# Patient Record
Sex: FEMALE | Race: BLACK OR AFRICAN AMERICAN | NOT HISPANIC OR LATINO | Employment: FULL TIME | URBAN - METROPOLITAN AREA
[De-identification: names, ages, dates, MRNs, and addresses within clinical notes are randomized per-mention and may not be internally consistent; named-entity substitution may affect disease eponyms.]

---

## 2017-01-08 ENCOUNTER — HOSPITAL ENCOUNTER (EMERGENCY)
Facility: HOSPITAL | Age: 22
Discharge: HOME | End: 2017-01-08
Attending: EMERGENCY MEDICINE | Admitting: EMERGENCY MEDICINE
Payer: COMMERCIAL

## 2017-01-08 VITALS
RESPIRATION RATE: 20 BRPM | HEIGHT: 63 IN | OXYGEN SATURATION: 98 % | BODY MASS INDEX: 26.93 KG/M2 | HEART RATE: 74 BPM | TEMPERATURE: 97.8 F | WEIGHT: 152 LBS | DIASTOLIC BLOOD PRESSURE: 72 MMHG | SYSTOLIC BLOOD PRESSURE: 129 MMHG

## 2017-01-08 DIAGNOSIS — L02.91 ABSCESS: Primary | ICD-10-CM

## 2017-01-08 LAB
ANION GAP SERPL CALCULATED.3IONS-SCNC: 9 MMOL/L (ref 4–13)
BUN SERPL-MCNC: 5 MG/DL (ref 5–25)
CALCIUM SERPL-MCNC: 9.2 MG/DL (ref 8.3–10.1)
CHLORIDE SERPL-SCNC: 103 MMOL/L (ref 100–108)
CO2 SERPL-SCNC: 27 MMOL/L (ref 21–32)
CREAT SERPL-MCNC: 0.69 MG/DL (ref 0.6–1.3)
EOSINOPHIL # BLD AUTO: 0.37 THOUSAND/UL (ref 0–0.61)
EOSINOPHIL NFR BLD MANUAL: 6 % (ref 0–6)
ERYTHROCYTE [DISTWIDTH] IN BLOOD BY AUTOMATED COUNT: 13.5 % (ref 11.6–15.1)
GFR SERPL CREATININE-BSD FRML MDRD: >60 ML/MIN/1.73SQ M
GLUCOSE SERPL-MCNC: 87 MG/DL (ref 65–140)
HCT VFR BLD AUTO: 41.1 % (ref 37–47)
HGB BLD-MCNC: 13.3 G/DL (ref 12–16)
LYMPHOCYTES # BLD AUTO: 1.22 THOUSAND/UL (ref 0.6–4.47)
LYMPHOCYTES # BLD AUTO: 20 %
MCH RBC QN AUTO: 25.7 PG (ref 27–31)
MCHC RBC AUTO-ENTMCNC: 32.3 G/DL (ref 31.4–37.4)
MCV RBC AUTO: 80 FL (ref 82–98)
MONOCYTES # BLD AUTO: 0.31 THOUSAND/UL (ref 0–1.22)
MONOCYTES NFR BLD AUTO: 5 % (ref 4–12)
NEUTS BAND NFR BLD MANUAL: 0 % (ref 0–8)
NEUTS SEG # BLD: 4.21 THOUSAND/UL (ref 1.81–6.82)
NEUTS SEG NFR BLD AUTO: 69 %
PLATELET # BLD AUTO: 232 THOUSANDS/UL (ref 130–400)
PLATELET BLD QL SMEAR: ADEQUATE
PMV BLD AUTO: 8.8 FL (ref 8.9–12.7)
POTASSIUM SERPL-SCNC: 4 MMOL/L (ref 3.5–5.3)
RBC # BLD AUTO: 5.17 MILLION/UL (ref 4.2–5.4)
SODIUM SERPL-SCNC: 139 MMOL/L (ref 136–145)
TOTAL CELLS COUNTED SPEC: 100
WBC # BLD AUTO: 6.1 THOUSAND/UL (ref 4.8–10.8)

## 2017-01-08 PROCEDURE — 85007 BL SMEAR W/DIFF WBC COUNT: CPT | Performed by: EMERGENCY MEDICINE

## 2017-01-08 PROCEDURE — 80048 BASIC METABOLIC PNL TOTAL CA: CPT | Performed by: EMERGENCY MEDICINE

## 2017-01-08 PROCEDURE — 36415 COLL VENOUS BLD VENIPUNCTURE: CPT | Performed by: EMERGENCY MEDICINE

## 2017-01-08 PROCEDURE — 85025 COMPLETE CBC W/AUTO DIFF WBC: CPT | Performed by: EMERGENCY MEDICINE

## 2017-01-08 PROCEDURE — 87040 BLOOD CULTURE FOR BACTERIA: CPT | Performed by: EMERGENCY MEDICINE

## 2017-01-08 PROCEDURE — 99283 EMERGENCY DEPT VISIT LOW MDM: CPT

## 2017-01-08 PROCEDURE — 85027 COMPLETE CBC AUTOMATED: CPT | Performed by: EMERGENCY MEDICINE

## 2017-01-08 RX ORDER — SULFAMETHOXAZOLE AND TRIMETHOPRIM 800; 160 MG/1; MG/1
1 TABLET ORAL 2 TIMES DAILY
Qty: 14 TABLET | Refills: 0 | Status: SHIPPED | OUTPATIENT
Start: 2017-01-08 | End: 2017-01-15

## 2017-01-08 RX ORDER — SULFAMETHOXAZOLE AND TRIMETHOPRIM 800; 160 MG/1; MG/1
1 TABLET ORAL ONCE
Status: COMPLETED | OUTPATIENT
Start: 2017-01-08 | End: 2017-01-08

## 2017-01-08 RX ADMIN — SULFAMETHOXAZOLE AND TRIMETHOPRIM 1 TABLET: 800; 160 TABLET ORAL at 18:02

## 2017-01-14 LAB
BACTERIA BLD CULT: NORMAL
BACTERIA BLD CULT: NORMAL

## 2017-05-19 ENCOUNTER — ALLSCRIPTS OFFICE VISIT (OUTPATIENT)
Dept: OTHER | Facility: OTHER | Age: 22
End: 2017-05-19

## 2017-06-16 ENCOUNTER — APPOINTMENT (EMERGENCY)
Dept: RADIOLOGY | Facility: HOSPITAL | Age: 22
End: 2017-06-16
Payer: COMMERCIAL

## 2017-06-16 ENCOUNTER — HOSPITAL ENCOUNTER (EMERGENCY)
Facility: HOSPITAL | Age: 22
Discharge: HOME | End: 2017-06-16
Attending: EMERGENCY MEDICINE | Admitting: EMERGENCY MEDICINE
Payer: COMMERCIAL

## 2017-06-16 VITALS
WEIGHT: 128 LBS | OXYGEN SATURATION: 100 % | RESPIRATION RATE: 20 BRPM | TEMPERATURE: 98.8 F | BODY MASS INDEX: 22.67 KG/M2 | HEART RATE: 83 BPM | SYSTOLIC BLOOD PRESSURE: 134 MMHG | DIASTOLIC BLOOD PRESSURE: 77 MMHG

## 2017-06-16 DIAGNOSIS — J45.901 ASTHMA EXACERBATION: Primary | ICD-10-CM

## 2017-06-16 PROCEDURE — 71020 HB CHEST X-RAY 2VW FRONTAL&LATL: CPT

## 2017-06-16 PROCEDURE — 99283 EMERGENCY DEPT VISIT LOW MDM: CPT

## 2017-06-16 PROCEDURE — 94640 AIRWAY INHALATION TREATMENT: CPT

## 2017-06-16 RX ORDER — IPRATROPIUM BROMIDE AND ALBUTEROL SULFATE 2.5; .5 MG/3ML; MG/3ML
3 SOLUTION RESPIRATORY (INHALATION) ONCE
Status: COMPLETED | OUTPATIENT
Start: 2017-06-16 | End: 2017-06-16

## 2017-06-16 RX ORDER — PREDNISONE 50 MG/1
50 TABLET ORAL DAILY
Qty: 5 TABLET | Refills: 0 | Status: SHIPPED | OUTPATIENT
Start: 2017-06-16 | End: 2017-06-21

## 2017-06-16 RX ORDER — ALBUTEROL SULFATE 90 UG/1
2 AEROSOL, METERED RESPIRATORY (INHALATION) EVERY 4 HOURS PRN
Qty: 1 INHALER | Refills: 0 | Status: SHIPPED | OUTPATIENT
Start: 2017-06-16 | End: 2017-10-05

## 2017-06-16 RX ORDER — PREDNISONE 20 MG/1
60 TABLET ORAL ONCE
Status: COMPLETED | OUTPATIENT
Start: 2017-06-16 | End: 2017-06-16

## 2017-06-16 RX ORDER — IPRATROPIUM BROMIDE AND ALBUTEROL SULFATE 2.5; .5 MG/3ML; MG/3ML
SOLUTION RESPIRATORY (INHALATION)
Status: COMPLETED
Start: 2017-06-16 | End: 2017-06-16

## 2017-06-16 RX ADMIN — IPRATROPIUM BROMIDE AND ALBUTEROL SULFATE 3 ML: .5; 3 SOLUTION RESPIRATORY (INHALATION) at 02:13

## 2017-06-16 RX ADMIN — IPRATROPIUM BROMIDE AND ALBUTEROL SULFATE 3 ML: 2.5; .5 SOLUTION RESPIRATORY (INHALATION) at 02:13

## 2017-06-16 RX ADMIN — IPRATROPIUM BROMIDE AND ALBUTEROL SULFATE 3 ML: .5; 3 SOLUTION RESPIRATORY (INHALATION) at 02:45

## 2017-06-16 RX ADMIN — PREDNISONE 60 MG: 20 TABLET ORAL at 02:44

## 2017-06-27 ENCOUNTER — GENERIC CONVERSION - ENCOUNTER (OUTPATIENT)
Dept: OTHER | Facility: OTHER | Age: 22
End: 2017-06-27

## 2017-07-11 ENCOUNTER — LAB CONVERSION - ENCOUNTER (OUTPATIENT)
Dept: FAMILY MEDICINE CLINIC | Facility: CLINIC | Age: 22
End: 2017-07-11

## 2017-08-02 ENCOUNTER — GENERIC CONVERSION - ENCOUNTER (OUTPATIENT)
Dept: OTHER | Facility: OTHER | Age: 22
End: 2017-08-02

## 2017-08-02 ENCOUNTER — APPOINTMENT (EMERGENCY)
Dept: RADIOLOGY | Facility: HOSPITAL | Age: 22
End: 2017-08-02
Payer: COMMERCIAL

## 2017-08-02 ENCOUNTER — HOSPITAL ENCOUNTER (EMERGENCY)
Facility: HOSPITAL | Age: 22
Discharge: HOME | End: 2017-08-02
Attending: EMERGENCY MEDICINE | Admitting: EMERGENCY MEDICINE
Payer: COMMERCIAL

## 2017-08-02 VITALS
OXYGEN SATURATION: 100 % | TEMPERATURE: 98.8 F | DIASTOLIC BLOOD PRESSURE: 83 MMHG | HEART RATE: 88 BPM | RESPIRATION RATE: 20 BRPM | SYSTOLIC BLOOD PRESSURE: 136 MMHG

## 2017-08-02 DIAGNOSIS — J45.901 ASTHMA EXACERBATION: Primary | ICD-10-CM

## 2017-08-02 LAB
BASOPHILS # BLD AUTO: 0 THOUSANDS/ΜL (ref 0–0.1)
BASOPHILS NFR BLD AUTO: 1 % (ref 0–1)
EOSINOPHIL # BLD AUTO: 0.5 THOUSAND/ΜL (ref 0–0.61)
EOSINOPHIL NFR BLD AUTO: 8 % (ref 0–6)
ERYTHROCYTE [DISTWIDTH] IN BLOOD BY AUTOMATED COUNT: 13.4 % (ref 11.6–15.1)
HCT VFR BLD AUTO: 43.4 % (ref 37–47)
HGB BLD-MCNC: 14.3 G/DL (ref 12–16)
LYMPHOCYTES # BLD AUTO: 2.4 THOUSANDS/ΜL (ref 0.6–4.47)
LYMPHOCYTES NFR BLD AUTO: 37 % (ref 14–44)
MCH RBC QN AUTO: 26.7 PG (ref 27–31)
MCHC RBC AUTO-ENTMCNC: 33 G/DL (ref 31.4–37.4)
MCV RBC AUTO: 81 FL (ref 82–98)
MONOCYTES # BLD AUTO: 0.5 THOUSAND/ΜL (ref 0.17–1.22)
MONOCYTES NFR BLD AUTO: 7 % (ref 4–12)
NEUTROPHILS # BLD AUTO: 3.1 THOUSANDS/ΜL (ref 1.85–7.62)
NEUTS SEG NFR BLD AUTO: 48 % (ref 43–75)
NRBC BLD AUTO-RTO: 0 /100 WBCS
PLATELET # BLD AUTO: 200 THOUSANDS/UL (ref 130–400)
PMV BLD AUTO: 8.5 FL (ref 8.9–12.7)
RBC # BLD AUTO: 5.35 MILLION/UL (ref 4.2–5.4)
WBC # BLD AUTO: 6.4 THOUSAND/UL (ref 4.8–10.8)

## 2017-08-02 PROCEDURE — 36415 COLL VENOUS BLD VENIPUNCTURE: CPT | Performed by: EMERGENCY MEDICINE

## 2017-08-02 PROCEDURE — 71010 HB CHEST X-RAY 1 VIEW FRONTAL (PORTABLE): CPT

## 2017-08-02 PROCEDURE — 85025 COMPLETE CBC W/AUTO DIFF WBC: CPT | Performed by: EMERGENCY MEDICINE

## 2017-08-02 PROCEDURE — 99285 EMERGENCY DEPT VISIT HI MDM: CPT

## 2017-08-02 PROCEDURE — 94644 CONT INHLJ TX 1ST HOUR: CPT

## 2017-08-02 PROCEDURE — 96374 THER/PROPH/DIAG INJ IV PUSH: CPT

## 2017-08-02 PROCEDURE — 94640 AIRWAY INHALATION TREATMENT: CPT

## 2017-08-02 RX ORDER — IPRATROPIUM BROMIDE AND ALBUTEROL SULFATE 2.5; .5 MG/3ML; MG/3ML
3 SOLUTION RESPIRATORY (INHALATION) ONCE
Status: COMPLETED | OUTPATIENT
Start: 2017-08-02 | End: 2017-08-02

## 2017-08-02 RX ORDER — IPRATROPIUM BROMIDE AND ALBUTEROL SULFATE 2.5; .5 MG/3ML; MG/3ML
SOLUTION RESPIRATORY (INHALATION)
Status: COMPLETED
Start: 2017-08-02 | End: 2017-08-02

## 2017-08-02 RX ORDER — METHYLPREDNISOLONE SODIUM SUCCINATE 125 MG/2ML
125 INJECTION, POWDER, LYOPHILIZED, FOR SOLUTION INTRAMUSCULAR; INTRAVENOUS ONCE
Status: COMPLETED | OUTPATIENT
Start: 2017-08-02 | End: 2017-08-02

## 2017-08-02 RX ORDER — ALBUTEROL SULFATE 90 UG/1
2 AEROSOL, METERED RESPIRATORY (INHALATION) EVERY 4 HOURS PRN
Qty: 1 INHALER | Refills: 0 | Status: SHIPPED | OUTPATIENT
Start: 2017-08-02 | End: 2017-10-05

## 2017-08-02 RX ORDER — PREDNISONE 20 MG/1
60 TABLET ORAL DAILY
Qty: 15 TABLET | Refills: 0 | Status: SHIPPED | OUTPATIENT
Start: 2017-08-02 | End: 2017-08-07

## 2017-08-02 RX ADMIN — IPRATROPIUM BROMIDE AND ALBUTEROL SULFATE 3 ML: 2.5; .5 SOLUTION RESPIRATORY (INHALATION) at 04:58

## 2017-08-02 RX ADMIN — IPRATROPIUM BROMIDE AND ALBUTEROL SULFATE 3 ML: .5; 3 SOLUTION RESPIRATORY (INHALATION) at 04:58

## 2017-08-02 RX ADMIN — METHYLPREDNISOLONE SODIUM SUCCINATE 125 MG: 125 INJECTION, POWDER, FOR SOLUTION INTRAMUSCULAR; INTRAVENOUS at 04:58

## 2017-08-02 RX ADMIN — ALBUTEROL SULFATE 10 MG: 2.5 SOLUTION RESPIRATORY (INHALATION) at 04:48

## 2017-08-03 ENCOUNTER — ALLSCRIPTS OFFICE VISIT (OUTPATIENT)
Dept: OTHER | Facility: OTHER | Age: 22
End: 2017-08-03

## 2017-08-22 ENCOUNTER — ALLSCRIPTS OFFICE VISIT (OUTPATIENT)
Dept: OTHER | Facility: OTHER | Age: 22
End: 2017-08-22

## 2017-09-12 ENCOUNTER — GENERIC CONVERSION - ENCOUNTER (OUTPATIENT)
Dept: OTHER | Facility: OTHER | Age: 22
End: 2017-09-12

## 2017-10-05 ENCOUNTER — HOSPITAL ENCOUNTER (EMERGENCY)
Facility: HOSPITAL | Age: 22
Discharge: HOME | End: 2017-10-05
Attending: EMERGENCY MEDICINE | Admitting: EMERGENCY MEDICINE
Payer: COMMERCIAL

## 2017-10-05 ENCOUNTER — APPOINTMENT (EMERGENCY)
Dept: RADIOLOGY | Facility: HOSPITAL | Age: 22
End: 2017-10-05
Payer: COMMERCIAL

## 2017-10-05 VITALS
HEIGHT: 63 IN | WEIGHT: 132 LBS | BODY MASS INDEX: 23.39 KG/M2 | HEART RATE: 99 BPM | DIASTOLIC BLOOD PRESSURE: 85 MMHG | OXYGEN SATURATION: 95 % | TEMPERATURE: 99.3 F | RESPIRATION RATE: 20 BRPM | SYSTOLIC BLOOD PRESSURE: 149 MMHG

## 2017-10-05 DIAGNOSIS — J45.901 ASTHMA EXACERBATION: Primary | ICD-10-CM

## 2017-10-05 PROCEDURE — 71020 HB CHEST X-RAY 2VW FRONTAL&LATL: CPT

## 2017-10-05 PROCEDURE — 99284 EMERGENCY DEPT VISIT MOD MDM: CPT

## 2017-10-05 PROCEDURE — 94640 AIRWAY INHALATION TREATMENT: CPT

## 2017-10-05 RX ORDER — PREDNISONE 20 MG/1
60 TABLET ORAL ONCE
Status: COMPLETED | OUTPATIENT
Start: 2017-10-05 | End: 2017-10-05

## 2017-10-05 RX ORDER — PROMETHAZINE HYDROCHLORIDE AND CODEINE PHOSPHATE 6.25; 1 MG/5ML; MG/5ML
5 SYRUP ORAL EVERY 4 HOURS PRN
Qty: 120 ML | Refills: 0 | Status: SHIPPED | OUTPATIENT
Start: 2017-10-05 | End: 2017-10-15

## 2017-10-05 RX ORDER — ALBUTEROL SULFATE 2.5 MG/3ML
5 SOLUTION RESPIRATORY (INHALATION) ONCE
Status: COMPLETED | OUTPATIENT
Start: 2017-10-05 | End: 2017-10-05

## 2017-10-05 RX ORDER — ALBUTEROL SULFATE 90 UG/1
2 AEROSOL, METERED RESPIRATORY (INHALATION) EVERY 4 HOURS PRN
Qty: 1 INHALER | Refills: 0 | Status: SHIPPED | OUTPATIENT
Start: 2017-10-05 | End: 2018-03-30 | Stop reason: ALTCHOICE

## 2017-10-05 RX ORDER — ALBUTEROL SULFATE 2.5 MG/3ML
2.5 SOLUTION RESPIRATORY (INHALATION) EVERY 6 HOURS PRN
Qty: 75 ML | Refills: 0 | Status: SHIPPED | OUTPATIENT
Start: 2017-10-05 | End: 2020-02-02

## 2017-10-05 RX ORDER — PREDNISONE 50 MG/1
50 TABLET ORAL DAILY
Qty: 5 TABLET | Refills: 0 | Status: SHIPPED | OUTPATIENT
Start: 2017-10-05 | End: 2017-10-10

## 2017-10-05 RX ADMIN — IPRATROPIUM BROMIDE 0.5 MG: 0.5 SOLUTION RESPIRATORY (INHALATION) at 11:53

## 2017-10-05 RX ADMIN — ALBUTEROL SULFATE 5 MG: 2.5 SOLUTION RESPIRATORY (INHALATION) at 11:53

## 2017-10-05 RX ADMIN — PREDNISONE 60 MG: 20 TABLET ORAL at 11:32

## 2017-10-05 NOTE — ED NOTES
Neb completed  Pt remains resting quietly with daughter in bed with pt  Pt  with resp easy and unlabored  Occasional moist productive cough noted         Melinda Yeager, PIALR  10/05/17 7414

## 2017-10-05 NOTE — DISCHARGE INSTRUCTIONS
Asthma, Ambulatory Care   GENERAL INFORMATION:   Asthma  is a lung disease that makes breathing difficult  Chronic inflammation and reactions to triggers narrow the airways in your lungs  Asthma can become life-threatening if it is not managed  Common symptoms include the following:   · Coughing     · Wheezing     · Shortness of breath     · Chest tightness  Seek immediate care for the following symptoms:   · Severe shortness of breath    · Blue or gray lips or nails    · Skin around your neck and ribs pulls in with each breath    · Shortness of breath, even after you take your short-term medicine as directed     · Peak flow numbers in the red zone of your asthma action plan  Treatment for asthma  will depend on how severe it is  Medicine may decrease inflammation, open airways, and make it easier to breathe  Medicines may be inhaled, taken as a pill, or injected  Short-term medicines relieve your symptoms quickly  Long-term medicines are used to prevent future attacks  You may also need medicine to help control your allergies  Manage and prevent future asthma attacks:   · Follow your asthma action pan  This is a written plan that you and your healthcare provider create  It explains which medicine you need and when to change doses if necessary  It also explains how you can monitor symptoms and use a peak flow meter  The meter measures how well your lungs are working  · Manage other health conditions , such as allergies, acid reflux, and sleep apnea  · Identify and avoid triggers  These may include pets, dust mites, mold, and cockroaches  · Do not smoke and avoid others who smoke  If you smoke, it is never too late to quit  Ask your healthcare provider if you need help quitting  · Ask about a flu vaccine  The flu can make your asthma worse  You may need a yearly flu shot  Follow up with your healthcare provider as directed:   You will need to return to make sure your medicine is working and your symptoms are controlled  You may be referred to an asthma or allergy specialist  Cecy Fletcher may be asked to keep a record of your peak flow values and bring it with you to your appointments  Write down your questions so you remember to ask them during your visits  CARE AGREEMENT:   You have the right to help plan your care  Learn about your health condition and how it may be treated  Discuss treatment options with your caregivers to decide what care you want to receive  You always have the right to refuse treatment  The above information is an  only  It is not intended as medical advice for individual conditions or treatments  Talk to your doctor, nurse or pharmacist before following any medical regimen to see if it is safe and effective for you  © 2014 [address] is for End User's use only and may not be sold, redistributed or otherwise used for commercial purposes  All illustrations and images included in CareNotes® are the copyrighted property of A D A M [employer] Inc  or Macario Larkin  Please take a list of all of your medications and discharge paperwork with you to all of your follow-up medical visits  Please take all of your medications as directed  Please call your family doctor or return to the ER if you have increased shortness of breath, chest pain, fevers, chills, nausea, vomiting, diarrhea, or any other worsening symptoms

## 2017-10-05 NOTE — ED NOTES
Pt uses peak flow for 100L/min  Pt noted with poor effort  Xray to be done now per Dr Jody Lane and give neb when pt returns per Dr Jody Castro RN  10/05/17 9355

## 2017-10-10 NOTE — ED PROVIDER NOTES
History  Chief Complaint   Patient presents with    Asthma     Pt reports starting Tuesday with cough and congestion and wheezing  Pt has been using nebs at home, and reports nebulizer machine is not working  31-year-old female with past medical history of asthma, eczema, presents to the ER with 3 days of increasing dry cough and shortness of breath  Patient has albuterol inhaler and nebulizer at home that she is using without relief  Patient states that she needs a new nebulizer machine  History provided by:  Patient  Asthma   Associated symptoms: cough and shortness of breath    Associated symptoms: no abdominal pain, no chest pain, no congestion, no diarrhea, no ear pain, no fever, no headaches, no nausea, no rash and no wheezing        Prior to Admission Medications   Prescriptions Last Dose Informant Patient Reported? Taking? albuterol (5 mg/mL) 0 5 % nebulizer solution  Self Yes Yes   Sig: Take by nebulization every 6 (six) hours as needed for wheezing   albuterol (PROVENTIL HFA,VENTOLIN HFA) 90 mcg/act inhaler   Yes No   Sig: Inhale 2 puffs every 6 (six) hours as needed for wheezing   albuterol (PROVENTIL HFA,VENTOLIN HFA) 90 mcg/act inhaler   No No   Sig: Inhale 2 puffs every 4 (four) hours as needed for wheezing   albuterol (PROVENTIL HFA,VENTOLIN HFA) 90 mcg/act inhaler   No No   Sig: Inhale 2 puffs every 4 (four) hours as needed for wheezing      Facility-Administered Medications: None       Past Medical History:   Diagnosis Date    Asthma     Eczema        Past Surgical History:   Procedure Laterality Date     SECTION             History reviewed  No pertinent family history  I have reviewed and agree with the history as documented      Social History   Substance Use Topics    Smoking status: Never Smoker    Smokeless tobacco: Never Used    Alcohol use No        Review of Systems   Constitutional: Negative for activity change, appetite change, chills and fever    HENT: Negative for congestion and ear pain  Eyes: Negative for pain and discharge  Respiratory: Positive for cough and shortness of breath  Negative for chest tightness, wheezing and stridor  Cardiovascular: Negative for chest pain and palpitations  Gastrointestinal: Negative for abdominal distention, abdominal pain, constipation, diarrhea and nausea  Endocrine: Negative for cold intolerance  Genitourinary: Negative for dysuria, frequency and urgency  Musculoskeletal: Negative for arthralgias and back pain  Skin: Negative for color change and rash  Allergic/Immunologic: Negative for environmental allergies and food allergies  Neurological: Negative for dizziness, weakness, numbness and headaches  Hematological: Negative for adenopathy  Psychiatric/Behavioral: Negative for agitation, behavioral problems and confusion  The patient is not nervous/anxious  All other systems reviewed and are negative  Physical Exam  ED Triage Vitals   Temperature Pulse Respirations Blood Pressure SpO2   10/05/17 1114 10/05/17 1107 10/05/17 1107 10/05/17 1107 10/05/17 1107   99 3 °F (37 4 °C) 99 20 149/85 95 %      Temp Source Heart Rate Source Patient Position - Orthostatic VS BP Location FiO2 (%)   10/05/17 1107 10/05/17 1107 10/05/17 1107 10/05/17 1107 --   Oral Monitor Lying Right arm       Pain Score       10/05/17 1107       6           Physical Exam   Constitutional: She is oriented to person, place, and time  She appears well-developed and well-nourished  HENT:   Head: Normocephalic and atraumatic  Mouth/Throat: Oropharynx is clear and moist    Eyes: Conjunctivae and EOM are normal    Neck: Normal range of motion  Neck supple  Cardiovascular: Normal rate, regular rhythm, normal heart sounds and intact distal pulses  Pulmonary/Chest: Effort normal  She has wheezes  Diffuse wheezing noted bilateral    Abdominal: Soft  Bowel sounds are normal  She exhibits no distension   There is no tenderness  Musculoskeletal: Normal range of motion  Neurological: She is alert and oriented to person, place, and time  Skin: Skin is warm and dry  Psychiatric: She has a normal mood and affect  Her behavior is normal  Judgment and thought content normal    Nursing note and vitals reviewed  ED Medications  Medications   albuterol inhalation solution 5 mg (5 mg Nebulization Given 10/5/17 1153)   ipratropium (ATROVENT) 0 02 % inhalation solution 0 5 mg (0 5 mg Nebulization Given 10/5/17 1153)   predniSONE tablet 60 mg (60 mg Oral Given 10/5/17 1132)       Diagnostic Studies  Labs Reviewed - No data to display    X-ray chest 2 views   Final Result      No active pulmonary disease  Workstation performed: [id number]             Procedures  Procedures      Phone Contacts  ED Phone Contact    ED Course  ED Course                                MDM  Number of Diagnoses or Management Options  Asthma exacerbation:   Diagnosis management comments: Obtain chest x-ray to rule out any infiltrates  Check peak flows, gave albuterol/ipratropium neb and prednisone  Amount and/or Complexity of Data Reviewed  Tests in the radiology section of CPT®: ordered and reviewed  Tests in the medicine section of CPT®: ordered and reviewed  Independent visualization of images, tracings, or specimens: yes    Risk of Complications, Morbidity, and/or Mortality  General comments: No acute infiltrate noted on x-ray  Patient's symptoms improved with nebs and prednisone  At this point her symptoms are consistent with acute asthma exacerbation  Patient is discharged home on prednisone burst, refill of albuterol and cough syrup  Close return instructions given for worsening symptoms  Patient agrees with discharge plan  Patient well appearing at time of discharge      Patient Progress  Patient progress: improved    CritCare Time    Disposition  Final diagnoses:   Asthma exacerbation     ED Disposition     ED Disposition Condition Comment    Discharge  Nataly Denson discharge to home/self care  Condition at discharge: Good        Follow-up Information     Follow up With Specialties Details Why [address] [address]  In 1 week Please follow up with her own family doctor or call the number above if you do not have a family doctor  [phone]          Discharge Medication List as of 10/5/2017  1:08 PM      START taking these medications    Details   albuterol (2 5 mg/3 mL) 0 083 % nebulizer solution Take 3 mL by nebulization every 6 (six) hours as needed for wheezing, Starting Thu 10/5/2017, Print      predniSONE 50 mg tablet Take 1 tablet by mouth daily for 5 days, Starting Thu 10/5/2017, Until Tue 10/10/2017, Print      promethazine-codeine (PHENERGAN WITH CODEINE) 6 25-10 mg/5 mL syrup Take 5 mL by mouth every 4 (four) hours as needed for cough for up to 10 days, Starting Thu 10/5/2017, Until Sun 10/15/2017, Print         CONTINUE these medications which have CHANGED    Details   albuterol (PROVENTIL HFA,VENTOLIN HFA) 90 mcg/act inhaler Inhale 2 puffs every 4 (four) hours as needed for wheezing, Starting Thu 10/5/2017, Print         STOP taking these medications       albuterol (5 mg/mL) 0 5 % nebulizer solution Comments:   Reason for Stopping:             No discharge procedures on file      ED Provider  Electronically Signed by       Ted Chapa DO  10/10/17 9141

## 2017-12-19 ENCOUNTER — HOSPITAL ENCOUNTER (EMERGENCY)
Facility: HOSPITAL | Age: 22
Discharge: HOME | End: 2017-12-19
Attending: EMERGENCY MEDICINE | Admitting: EMERGENCY MEDICINE
Payer: COMMERCIAL

## 2017-12-19 VITALS
DIASTOLIC BLOOD PRESSURE: 82 MMHG | RESPIRATION RATE: 20 BRPM | HEART RATE: 78 BPM | TEMPERATURE: 98.9 F | OXYGEN SATURATION: 99 % | SYSTOLIC BLOOD PRESSURE: 141 MMHG

## 2017-12-19 VITALS
DIASTOLIC BLOOD PRESSURE: 79 MMHG | HEIGHT: 63 IN | BODY MASS INDEX: 23.74 KG/M2 | OXYGEN SATURATION: 96 % | SYSTOLIC BLOOD PRESSURE: 131 MMHG | WEIGHT: 134 LBS | TEMPERATURE: 98.5 F | RESPIRATION RATE: 18 BRPM | HEART RATE: 104 BPM

## 2017-12-19 DIAGNOSIS — B86 SCABIES: ICD-10-CM

## 2017-12-19 DIAGNOSIS — B86 POST-SCABETIC DERMATITIS: Primary | ICD-10-CM

## 2017-12-19 DIAGNOSIS — R20.8 PAIN OF SKIN: Primary | ICD-10-CM

## 2017-12-19 PROCEDURE — 99283 EMERGENCY DEPT VISIT LOW MDM: CPT

## 2017-12-19 PROCEDURE — 96372 THER/PROPH/DIAG INJ SC/IM: CPT

## 2017-12-19 PROCEDURE — 99282 EMERGENCY DEPT VISIT SF MDM: CPT

## 2017-12-19 RX ORDER — NAPROXEN 500 MG/1
500 TABLET ORAL 2 TIMES DAILY WITH MEALS
Qty: 14 TABLET | Refills: 0 | Status: SHIPPED | OUTPATIENT
Start: 2017-12-19 | End: 2018-01-26 | Stop reason: ALTCHOICE

## 2017-12-19 RX ORDER — PERMETHRIN 50 MG/G
CREAM TOPICAL ONCE
Qty: 60 G | Refills: 0 | Status: SHIPPED | OUTPATIENT
Start: 2017-12-19 | End: 2017-12-19

## 2017-12-19 RX ORDER — KETOROLAC TROMETHAMINE 30 MG/ML
15 INJECTION, SOLUTION INTRAMUSCULAR; INTRAVENOUS ONCE
Status: DISCONTINUED | OUTPATIENT
Start: 2017-12-19 | End: 2017-12-19

## 2017-12-19 RX ORDER — KETOROLAC TROMETHAMINE 30 MG/ML
15 INJECTION, SOLUTION INTRAMUSCULAR; INTRAVENOUS ONCE
Status: COMPLETED | OUTPATIENT
Start: 2017-12-19 | End: 2017-12-19

## 2017-12-19 RX ORDER — PERMETHRIN 50 MG/G
CREAM TOPICAL ONCE
Status: COMPLETED | OUTPATIENT
Start: 2017-12-19 | End: 2017-12-19

## 2017-12-19 RX ORDER — CYCLOBENZAPRINE HCL 10 MG
10 TABLET ORAL ONCE
Status: DISCONTINUED | OUTPATIENT
Start: 2017-12-19 | End: 2017-12-19 | Stop reason: HOSPADM

## 2017-12-19 RX ADMIN — PERMETHRIN: 50 CREAM TOPICAL at 13:03

## 2017-12-19 RX ADMIN — KETOROLAC TROMETHAMINE 15 MG: 30 INJECTION, SOLUTION INTRAMUSCULAR at 20:22

## 2017-12-19 NOTE — ED PROVIDER NOTES
History  Chief Complaint   Patient presents with    Itching     c/o generalized body "itching and burning" x several weeks  saw [facility] who started prednisone, but patient states it did not help  History provided by:  Patient  Rash   Location:  Shoulder/arm  Shoulder/arm rash location:  L upper arm, R upper arm, L forearm, R forearm, L wrist and R wrist  Quality: itchiness and scaling    Severity:  Moderate  Onset quality:  Gradual  Timing:  Constant  Progression:  Worsening  Chronicity:  New  Relieved by:  Nothing  Worsened by:  Nothing  Ineffective treatments: Patient placed on oral steroids for 5 days duration with no improvement of symptoms  Associated symptoms: no abdominal pain, no diarrhea, no fever, no headaches, no myalgias, no nausea, no shortness of breath, no sore throat and not wheezing    Associated symptoms comment:  Family members with similar type symptoms in itchiness  No over-the-counter symptoms including Benadryl steroid prescription or creams have helped  Patient does have a history of eczema  Multiple areas of excoriation noted with profuse intense pruritus      Prior to Admission Medications   Prescriptions Last Dose Informant Patient Reported? Taking? albuterol (2 5 mg/3 mL) 0 083 % nebulizer solution   No Yes   Sig: Take 3 mL by nebulization every 6 (six) hours as needed for wheezing   albuterol (PROVENTIL HFA,VENTOLIN HFA) 90 mcg/act inhaler   No Yes   Sig: Inhale 2 puffs every 4 (four) hours as needed for wheezing      Facility-Administered Medications: None       Past Medical History:   Diagnosis Date    Asthma     Eczema        Past Surgical History:   Procedure Laterality Date     SECTION             History reviewed  No pertinent family history  I have reviewed and agree with the history as documented      Social History   Substance Use Topics    Smoking status: Never Smoker    Smokeless tobacco: Never Used    Alcohol use No        Review of Systems   Constitutional: Negative for chills and fever  HENT: Negative for rhinorrhea, sore throat and trouble swallowing  Eyes: Negative for pain  Respiratory: Negative for cough, shortness of breath, wheezing and stridor  Cardiovascular: Negative for chest pain and leg swelling  Gastrointestinal: Negative for abdominal pain, diarrhea and nausea  Endocrine: Negative for polyuria  Genitourinary: Negative for dysuria, flank pain and urgency  Musculoskeletal: Negative for joint swelling, myalgias and neck stiffness  Skin: Positive for rash  Allergic/Immunologic: Negative for immunocompromised state  Neurological: Negative for dizziness, syncope, weakness, numbness and headaches  Psychiatric/Behavioral: Negative for confusion and suicidal ideas  All other systems reviewed and are negative  Physical Exam  ED Triage Vitals [12/19/17 1230]   Temperature Pulse Respirations Blood Pressure SpO2   98 5 °F (36 9 °C) 104 18 131/79 96 %      Temp Source Heart Rate Source Patient Position - Orthostatic VS BP Location FiO2 (%)   Tympanic Monitor -- -- --      Pain Score       8           Orthostatic Vital Signs  Vitals:    12/19/17 1230   BP: 131/79   Pulse: 104       Physical Exam   Constitutional: She is oriented to person, place, and time  She appears well-developed and well-nourished  HENT:   Head: Normocephalic and atraumatic  Eyes: EOM are normal  Pupils are equal, round, and reactive to light  Neck: Normal range of motion  Neck supple  Cardiovascular: Normal rate and regular rhythm  Exam reveals no friction rub  No murmur heard  Pulmonary/Chest: Breath sounds normal  No respiratory distress  She has no wheezes  She has no rales  Abdominal: Soft  Bowel sounds are normal  She exhibits no distension  There is no tenderness  Musculoskeletal: Normal range of motion  She exhibits no edema or tenderness  Neurological: She is alert and oriented to person, place, and time     Skin: Skin is warm and dry  Rash noted  There is erythema  Dry scaly skin  Areas of excoriation  From scratching   Nursing note and vitals reviewed  ED Medications  Medications   permethrin (ELIMITE) 5 % cream ( Topical Given 12/19/17 1303)       Diagnostic Studies  Results Reviewed     None                 No orders to display              Procedures  Procedures       Phone Contacts  ED Phone Contact    ED Course  ED Course                                MDM  Number of Diagnoses or Management Options  Post-scabetic dermatitis: new and requires workup  Scabies: new and requires workup  Diagnosis management comments: Contact dermatitis versus scabies versus allergic reaction, over-the-counter medications for itchiness have not been helping  Recommend planning on outpatient management per with permethrin cream and chills given the fact the family members in the house have similar symptoms  Plan outpatient management followup given strict instructions when to return back to the emergency department  CritCare Time    Disposition  Final diagnoses:   Post-scabetic dermatitis   Scabies     Time reflects when diagnosis was documented in both MDM as applicable and the Disposition within this note     Time User Action Codes Description Comment    12/19/2017 12:48 PM Masha Ramos Add [B86] Post-scabetic dermatitis     12/19/2017 12:48 PM Masha Martinez [B86] Scabies       ED Disposition     ED Disposition Condition Comment    Discharge  Matthias Mejia discharge to home/self care      Condition at discharge: Stable        Follow-up Information    None       Discharge Medication List as of 12/19/2017 12:51 PM      START taking these medications    Details   permethrin (ELIMITE) 5 % cream Apply topically once for 1 dose, Starting Tue 12/19/2017, Print         CONTINUE these medications which have NOT CHANGED    Details   albuterol (2 5 mg/3 mL) 0 083 % nebulizer solution Take 3 mL by nebulization every 6 (six) hours as needed for wheezing, Starting Thu 10/5/2017, Print      albuterol (PROVENTIL HFA,VENTOLIN HFA) 90 mcg/act inhaler Inhale 2 puffs every 4 (four) hours as needed for wheezing, Starting u 10/5/2017, Print           No discharge procedures on file      ED Provider  Electronically Signed by           Tejas Baltazar,   12/19/17 6315

## 2017-12-19 NOTE — DISCHARGE INSTRUCTIONS
Scabies   WHAT YOU NEED TO KNOW:   Scabies is a skin condition that is caused by scabies mites  Scabies mites are tiny bugs that burrow, lay eggs, and live underneath the skin  Scabies is spread through close contact with a person who has scabies  This includes having sex, sleeping in the same bed, or sharing towels or clothing  Scabies can spread quickly and must be treated as soon as it is found  DISCHARGE INSTRUCTIONS:   Return to the emergency department if:   · You develop a fever and red, swollen, painful areas on your skin  Contact your healthcare provider if:   · The bites become crusty or filled with pus  · You have worsening itching after scabies treatment  · You have new bite or burrow marks after treatment  · You have questions or concerns about your condition or care  Medicines:   · Prescription creams  are used to treat scabies  You will need to apply them over all of your body from the neck down  Do not swallow this medicine  An oral medication may be ordered if scabies is severe  · Take your medicine as directed  Contact your healthcare provider if you think your medicine is not helping or if you have side effects  Tell him or her if you are allergic to any medicine  Keep a list of the medicines, vitamins, and herbs you take  Include the amounts, and when and why you take them  Bring the list or the pill bottles to follow-up visits  Carry your medicine list with you in case of an emergency  Follow up with your healthcare provider as directed:  Write down your questions so you remember to ask them during your visits  Prevent the spread of scabies:   · Have all family members use scabies medicine  Tell all sex partners and anyone who has shared your clothing or bed for the past month about the scabies  Tell them to ask their healthcare provider for scabies medicine even if they have no itching, rash, or burrow marks      · Wash all items that you have used since 3 days before you learned about your scabies  Use hot water to wash all clothing, bedding, and towels  Dry them for at least 20 minutes on the hot cycle of a dryer  Take items to be dry cleaned that cannot be washed in a washing machine  Place any clothing or bedding that cannot be washed or dry cleaned in a closed plastic bag for 1 week  · Do not have close body contact with anyone until the scabies mites are gone  Talk to your healthcare provider about how long you need to wait  Also ask about public places to avoid, such as the gym  Help relieve itching: Your skin may continue to itch for 2 or 3 weeks, even after the scabies mites are gone  Over-the-counter antihistamines or cortisone cream may help relieve itching  Trim your fingernails so you do not spread any mites that are still alive after treatment  Do not scratch your skin  Scratches may cause a skin infection  A cool bath may also help relieve the itching  Return to school or work: You may return to school or work 24 hours after using scabies medicine  © 2017 [address] Information is for End User's use only and may not be sold, redistributed or otherwise used for commercial purposes  All illustrations and images included in CareNotes® are the copyrighted property of [employer] M [employer]  or [employer] Larkin  The above information is an  only  It is not intended as medical advice for individual conditions or treatments  Talk to your doctor, nurse or pharmacist before following any medical regimen to see if it is safe and effective for you  Scabies   WHAT YOU NEED TO KNOW:   Scabies is a skin condition that is caused by scabies mites  Scabies mites are tiny bugs that burrow, lay eggs, and live underneath the skin  Scabies is spread through close contact with a person who has scabies  This includes having sex, sleeping in the same bed, or sharing towels or clothing   Scabies can spread quickly and must be treated as soon as it is found  DISCHARGE INSTRUCTIONS:   Seek care immediately if:   · You develop a fever and red, swollen, painful areas on your skin  Contact your healthcare provider if:   · The bites become crusty or filled with pus  · You have worsening itching after scabies treatment  · You have new bite or burrow marks after treatment  · You have questions or concerns about your condition or care  Medicines:   · Prescription creams  are used to treat scabies  You will need to apply them over all of your body from the neck down  Do not swallow this medicine  An oral medication may be ordered if scabies is severe  · Take your medicine as directed  Contact your healthcare provider if you think your medicine is not helping or if you have side effects  Tell him or her if you are allergic to any medicine  Keep a list of the medicines, vitamins, and herbs you take  Include the amounts, and when and why you take them  Bring the list or the pill bottles to follow-up visits  Carry your medicine list with you in case of an emergency  Follow up with your healthcare provider as directed:  Write down your questions so you remember to ask them during your visits  Prevent the spread of scabies:   · Have all family members use scabies medicine  Tell all sex partners and anyone who has shared your clothing or bed for the past month about the scabies  Tell them to ask their healthcare provider for scabies medicine even if they have no itching, rash, or burrow marks  · Wash all items that you have used since 3 days before you learned about your scabies  Use hot water to wash all clothing, bedding, and towels  Dry them for at least 20 minutes on the hot cycle of a dryer  Take items to be dry cleaned that cannot be washed in a washing machine  Place any clothing or bedding that cannot be washed or dry cleaned in a closed plastic bag for 1 week      · Do not have close body contact with anyone until the scabies mites are gone  Talk to your healthcare provider about how long you need to wait  Also ask about public places to avoid, such as the gym  Help relieve itching: Your skin may continue to itch for 2 or 3 weeks, even after the scabies mites are gone  Over-the-counter antihistamines or cortisone cream may help relieve itching  Trim your fingernails so you do not spread any mites that are still alive after treatment  Do not scratch your skin  Scratches may cause a skin infection  A cool bath may also help relieve the itching  Return to school or work: You may return to school or work 24 hours after using scabies medicine  © 2017 [facility] Information is for End User's use only and may not be sold, redistributed or otherwise used for commercial purposes  All illustrations and images included in CareNotes® are the copyrighted property of [employer]  or [employer] Larkin  The above information is an  only  It is not intended as medical advice for individual conditions or treatments  Talk to your doctor, nurse or pharmacist before following any medical regimen to see if it is safe and effective for you

## 2017-12-20 NOTE — ED PROVIDER NOTES
History  Chief Complaint   Patient presents with    Back Pain     pt presents to the ed with upper back and neck pain  pt was seen in the ed earlier today for rash  pt was treated for scabies      Patient presents for pain all over from face to waist on back and front  Patient seen her earlier today and treated for scabies skin rahs  States skin burns and is uncomfortable  She did have the pain prior to treatment for the scabies as well  Did not take anything for the pain  History provided by:  Patient   used: No    Back Pain       Prior to Admission Medications   Prescriptions Last Dose Informant Patient Reported? Taking? albuterol (2 5 mg/3 mL) 0 083 % nebulizer solution   No No   Sig: Take 3 mL by nebulization every 6 (six) hours as needed for wheezing   albuterol (PROVENTIL HFA,VENTOLIN HFA) 90 mcg/act inhaler   No No   Sig: Inhale 2 puffs every 4 (four) hours as needed for wheezing   permethrin (ELIMITE) 5 % cream   No No   Sig: Apply topically once for 1 dose      Facility-Administered Medications: None       Past Medical History:   Diagnosis Date    Asthma     Eczema        Past Surgical History:   Procedure Laterality Date     SECTION             No family history on file  I have reviewed and agree with the history as documented  Social History   Substance Use Topics    Smoking status: Never Smoker    Smokeless tobacco: Never Used    Alcohol use No        Review of Systems   Skin: Positive for rash  All other systems reviewed and are negative        Physical Exam  ED Triage Vitals   Temperature Pulse Respirations Blood Pressure SpO2   17   98 9 °F (37 2 °C) 78 20 141/82 99 %      Temp Source Heart Rate Source Patient Position - Orthostatic VS BP Location FiO2 (%)   17 -- 17 2004 17 --   Oral  Sitting Right arm       Pain Score       17 9839 5           Orthostatic Vital Signs  Vitals:    12/19/17 2004   BP: 141/82   Pulse: 78   Patient Position - Orthostatic VS: Sitting       Physical Exam   Constitutional: She is oriented to person, place, and time  No distress  HENT:   Mouth/Throat: Oropharynx is clear and moist    Eyes: Pupils are equal, round, and reactive to light  Neck: Normal range of motion  Cardiovascular: Normal rate, regular rhythm and intact distal pulses  Pulmonary/Chest: Effort normal and breath sounds normal  No respiratory distress  Abdominal: Soft  There is no tenderness  Musculoskeletal: Normal range of motion  Neurological: She is alert and oriented to person, place, and time  Skin: Capillary refill takes less than 2 seconds  Rash noted  She is not diaphoretic  Diffuse rash on torso with multiple yair of excoriation from scratching and mild erythema, similar in description in previous note  There is no skin peeling or mouth lesions  Rash is also pruritic  Nursing note and vitals reviewed  ED Medications  Medications   ketorolac (TORADOL) injection 15 mg (15 mg Intramuscular Given 12/19/17 2022)       Diagnostic Studies  Results Reviewed     None                 No orders to display              Procedures  Procedures       Phone Contacts  ED Phone Contact    ED Course  ED Course                                MDM  Number of Diagnoses or Management Options  Pain of skin:   Diagnosis management comments: Patient given Toradol for the pain and discharged on Naproxen      Pulse ox 99% on RA indicating adequate oxygenation       Amount and/or Complexity of Data Reviewed  Decide to obtain previous medical records or to obtain history from someone other than the patient: yes  Review and summarize past medical records: yes    Patient Progress  Patient progress: stable    CritCare Time    Disposition  Final diagnoses:   Pain of skin     Time reflects when diagnosis was documented in both MDM as applicable and the Disposition within this note     Time User Action Codes Description Comment    12/19/2017  8:49 PM Daniel Garnett Add [R20 8] Pain of skin       ED Disposition     ED Disposition Condition Comment    Discharge  Arlijesse Garciay discharge to home/self care  Condition at discharge: stable        Follow-up Information     Follow up With Specialties Details Why [address]  In 3 days  [phone]          Discharge Medication List as of 12/19/2017  8:50 PM      START taking these medications    Details   naproxen (NAPROSYN) 500 mg tablet Take 1 tablet by mouth 2 (two) times a day with meals for 7 days, Starting Tue 12/19/2017, Until Tue 12/26/2017, Print         CONTINUE these medications which have NOT CHANGED    Details   albuterol (2 5 mg/3 mL) 0 083 % nebulizer solution Take 3 mL by nebulization every 6 (six) hours as needed for wheezing, Starting Thu 10/5/2017, Print      albuterol (PROVENTIL HFA,VENTOLIN HFA) 90 mcg/act inhaler Inhale 2 puffs every 4 (four) hours as needed for wheezing, Starting Thu 10/5/2017, Print      permethrin (ELIMITE) 5 % cream Apply topically once for 1 dose, Starting Tue 12/19/2017, Print           No discharge procedures on file      ED Provider  Electronically Signed by           Kan Marcelino, DO  12/19/17 4278 Jaime Goodson, DO  12/19/17 2448

## 2017-12-20 NOTE — DISCHARGE INSTRUCTIONS
Scabies   WHAT YOU NEED TO KNOW:   Scabies is a skin condition that is caused by scabies mites  Scabies mites are tiny bugs that burrow, lay eggs, and live underneath the skin  Scabies is spread through close contact with a person who has scabies  This includes having sex, sleeping in the same bed, or sharing towels or clothing  Scabies can spread quickly and must be treated as soon as it is found  DISCHARGE INSTRUCTIONS:   Seek care immediately if:   · You develop a fever and red, swollen, painful areas on your skin  Contact your healthcare provider if:   · The bites become crusty or filled with pus  · You have worsening itching after scabies treatment  · You have new bite or burrow marks after treatment  · You have questions or concerns about your condition or care  Medicines:   · Prescription creams  are used to treat scabies  You will need to apply them over all of your body from the neck down  Do not swallow this medicine  An oral medication may be ordered if scabies is severe  · Take your medicine as directed  Contact your healthcare provider if you think your medicine is not helping or if you have side effects  Tell him or her if you are allergic to any medicine  Keep a list of the medicines, vitamins, and herbs you take  Include the amounts, and when and why you take them  Bring the list or the pill bottles to follow-up visits  Carry your medicine list with you in case of an emergency  Follow up with your healthcare provider as directed:  Write down your questions so you remember to ask them during your visits  Prevent the spread of scabies:   · Have all family members use scabies medicine  Tell all sex partners and anyone who has shared your clothing or bed for the past month about the scabies  Tell them to ask their healthcare provider for scabies medicine even if they have no itching, rash, or burrow marks      · Wash all items that you have used since 3 days before you learned about your scabies  Use hot water to wash all clothing, bedding, and towels  Dry them for at least 20 minutes on the hot cycle of a dryer  Take items to be dry cleaned that cannot be washed in a washing machine  Place any clothing or bedding that cannot be washed or dry cleaned in a closed plastic bag for 1 week  · Do not have close body contact with anyone until the scabies mites are gone  Talk to your healthcare provider about how long you need to wait  Also ask about public places to avoid, such as the gym  Help relieve itching: Your skin may continue to itch for 2 or 3 weeks, even after the scabies mites are gone  Over-the-counter antihistamines or cortisone cream may help relieve itching  Trim your fingernails so you do not spread any mites that are still alive after treatment  Do not scratch your skin  Scratches may cause a skin infection  A cool bath may also help relieve the itching  Return to school or work: You may return to school or work 24 hours after using scabies medicine  © 2017 [employer]  Information is for End User's use only and may not be sold, redistributed or otherwise used for commercial purposes  All illustrations and images included in CareNotes® are the copyrighted property of [employer]  or [employer]  The above information is an  only  It is not intended as medical advice for individual conditions or treatments  Talk to your doctor, nurse or pharmacist before following any medical regimen to see if it is safe and effective for you

## 2017-12-23 ENCOUNTER — HOSPITAL ENCOUNTER (EMERGENCY)
Facility: HOSPITAL | Age: 22
Discharge: HOME | End: 2017-12-23
Attending: EMERGENCY MEDICINE | Admitting: EMERGENCY MEDICINE
Payer: COMMERCIAL

## 2017-12-23 VITALS
RESPIRATION RATE: 17 BRPM | WEIGHT: 135 LBS | OXYGEN SATURATION: 97 % | DIASTOLIC BLOOD PRESSURE: 80 MMHG | BODY MASS INDEX: 23.91 KG/M2 | TEMPERATURE: 98.7 F | SYSTOLIC BLOOD PRESSURE: 137 MMHG | HEART RATE: 98 BPM

## 2017-12-23 DIAGNOSIS — L30.9 ECZEMA: Primary | ICD-10-CM

## 2017-12-23 PROCEDURE — 99282 EMERGENCY DEPT VISIT SF MDM: CPT

## 2017-12-23 RX ORDER — PREDNISONE 10 MG/1
10 TABLET ORAL DAILY
Qty: 40 TABLET | Refills: 0 | Status: SHIPPED | OUTPATIENT
Start: 2017-12-23 | End: 2018-01-26 | Stop reason: ALTCHOICE

## 2017-12-23 RX ORDER — CYCLOBENZAPRINE HCL 10 MG
10 TABLET ORAL 2 TIMES DAILY PRN
Qty: 20 TABLET | Refills: 0 | Status: SHIPPED | OUTPATIENT
Start: 2017-12-23 | End: 2018-03-30 | Stop reason: ALTCHOICE

## 2017-12-23 RX ORDER — PREDNISONE 20 MG/1
60 TABLET ORAL ONCE
Status: COMPLETED | OUTPATIENT
Start: 2017-12-23 | End: 2017-12-23

## 2017-12-23 RX ADMIN — PREDNISONE 60 MG: 20 TABLET ORAL at 16:37

## 2017-12-23 NOTE — DISCHARGE INSTRUCTIONS
Please take a list of all of your medications and discharge paperwork with you to all of your follow-up medical visits  Please take all of your medications as directed  Please use the prednisone as directed  You can apply Aquaphor skin only and throughout your skin  Please call your doctor or return to the ER if you have increased shortness of breath, chest pain, fevers, chills, nausea, vomiting, diarrhea, or any other worsening symptoms  Dyshidrotic Eczema   WHAT YOU NEED TO KNOW:   Dyshidrotic eczema is a recurrent skin rash with small fluid-filled blisters  The blisters appear on palms of your hands, on the sides of your fingers, and soles of your feet  The exact cause is unknown  Your risk may be increased if you have allergies, you smoke, or have other skin conditions, such as atopic dermatitis  Your risk may also increase if you eat a diet high in metal salts  Foods such as mushrooms, chocolate and coffee contain metal salts  DISCHARGE INSTRUCTIONS:   Contact your healthcare provider if:   · The area of your rash is swollen, painful, draining fluid, and feels hot  · The blisters have yellow crust on them  · You have questions or concerns about your condition or care  Medicines:   · Medicines  help decrease itching and how long you have a rash  This medicine may be a pill or cream  You may also need medicine to treat an infection  · Take your medicine as directed  Contact your healthcare provider if you think your medicine is not helping or if you have side effects  Tell him of her if you are allergic to any medicine  Keep a list of the medicines, vitamins, and herbs you take  Include the amounts, and when and why you take them  Bring the list or the pill bottles to follow-up visits  Carry your medicine list with you in case of an emergency  Care for your rash:   · Do not scratch your rash  Bacteria from your fingernails may enter your open sores during scratching and cause an infection  · Use moisturizes or emollients, such as petroleum jelly  These help relieve itching and help prevent bacteria from getting in your sores  If you have a doctor's order for medicated cream, apply that first  Then apply the moisturizer or emollient on top  · Wear cotton socks and gloves  Kenneth Lopez keeps your rash dry, which helps with itching  Gloves and socks help your medicated cream work better  · Ask your healthcare provider how often you should wash your hands  You may need to wash them less often while they heal  Do not use hand  with ethyl alcohol  · Ask your healthcare provider if you need allergy testing  Allergy testing may help identify what triggers your eczema  Prevent the return of your rash:   · Identify and avoid possible triggers  · Avoid sweating more than normal      · Find ways to handle stress  · Decrease the amount of metal salts in your diet  Avoid onions, tomatoes, beans and beer  Ask your healthcare provider what other foods you should avoid  · Do not smoke  If you smoke, it is never too late to quit  Ask for information if you need help quitting  Follow up with your healthcare provider as directed:  Write down your questions so you remember to ask them during your visits  © 2017 [employer]  Information is for End User's use only and may not be sold, redistributed or otherwise used for commercial purposes  All illustrations and images included in CareNotes® are the copyrighted property of [employer]  or [employer]  The above information is an  only  It is not intended as medical advice for individual conditions or treatments  Talk to your doctor, nurse or pharmacist before following any medical regimen to see if it is safe and effective for you

## 2017-12-23 NOTE — ED PROVIDER NOTES
History  Chief Complaint   Patient presents with    Skin Problem     Pt reports eczema on her back, which is painful  Pt reports being seen here for same recently  81-year-old female with past medical history of eczema, asthma presents to the ER with complaint of eczema flare  Patient states that she had been on prednisone and topical Aquaphor intermittently however is not helping with her symptoms  Over the past week patient has had diffuse outbreak of her eczema from face to feet  Patient states that her dermatologist is not helping with her symptoms  Patient came to the ER for further evaluation  Patient was seen here last week for possible scabies and was treated with permethrin  Patient denies any itchiness  Patient now notes diffuse pain on her skin that is consistent with eczema flare  History provided by:  Patient      Prior to Admission Medications   Prescriptions Last Dose Informant Patient Reported? Taking? albuterol (2 5 mg/3 mL) 0 083 % nebulizer solution   No No   Sig: Take 3 mL by nebulization every 6 (six) hours as needed for wheezing   albuterol (PROVENTIL HFA,VENTOLIN HFA) 90 mcg/act inhaler   No No   Sig: Inhale 2 puffs every 4 (four) hours as needed for wheezing   naproxen (NAPROSYN) 500 mg tablet   No No   Sig: Take 1 tablet by mouth 2 (two) times a day with meals for 7 days      Facility-Administered Medications: None       Past Medical History:   Diagnosis Date    Asthma     Eczema        Past Surgical History:   Procedure Laterality Date     SECTION             History reviewed  No pertinent family history  I have reviewed and agree with the history as documented  Social History   Substance Use Topics    Smoking status: Never Smoker    Smokeless tobacco: Never Used    Alcohol use No        Review of Systems   Constitutional: Negative for activity change, appetite change, chills and fever  HENT: Negative for congestion and ear pain  Eyes: Negative for pain and discharge  Respiratory: Negative for cough, chest tightness, shortness of breath, wheezing and stridor  Cardiovascular: Negative for chest pain and palpitations  Gastrointestinal: Negative for abdominal distention, abdominal pain, constipation, diarrhea and nausea  Endocrine: Negative for cold intolerance  Genitourinary: Negative for dysuria, frequency and urgency  Musculoskeletal: Negative for arthralgias and back pain  Skin: Positive for rash  Negative for color change  Allergic/Immunologic: Negative for environmental allergies and food allergies  Neurological: Negative for dizziness, weakness, numbness and headaches  Hematological: Negative for adenopathy  Psychiatric/Behavioral: Negative for agitation, behavioral problems and confusion  The patient is not nervous/anxious  All other systems reviewed and are negative  Physical Exam  ED Triage Vitals [12/23/17 1608]   Temperature Pulse Respirations Blood Pressure SpO2   98 7 °F (37 1 °C) 98 17 137/80 97 %      Temp Source Heart Rate Source Patient Position - Orthostatic VS BP Location FiO2 (%)   Tympanic Monitor Standing Right arm --      Pain Score       Worst Possible Pain           Orthostatic Vital Signs  Vitals:    12/23/17 1608   BP: 137/80   Pulse: 98   Patient Position - Orthostatic VS: Standing       Physical Exam   Constitutional: She is oriented to person, place, and time  She appears well-developed and well-nourished  HENT:   Head: Normocephalic and atraumatic  Mouth/Throat: Oropharynx is clear and moist    Eyes: Conjunctivae and EOM are normal    Neck: Normal range of motion  Neck supple  Cardiovascular: Normal rate, regular rhythm, normal heart sounds and intact distal pulses  Pulmonary/Chest: Effort normal and breath sounds normal    Abdominal: Soft  Bowel sounds are normal  She exhibits no distension  There is no tenderness  Musculoskeletal: Normal range of motion  Neurological: She is alert and oriented to person, place, and time  Skin: Skin is warm and dry  Erythematous, scaly, macular lesions noted diffusely to face, trunk, hands and feet  Psychiatric: She has a normal mood and affect  Her behavior is normal  Judgment and thought content normal    Nursing note and vitals reviewed  ED Medications  Medications   predniSONE tablet 60 mg (not administered)       Diagnostic Studies  Results Reviewed     None                 No orders to display              Procedures  Procedures       Phone Contacts  ED Phone Contact    ED Course  ED Course                                MDM  Number of Diagnoses or Management Options  Eczema:      Amount and/or Complexity of Data Reviewed  Review and summarize past medical records: yes  Independent visualization of images, tracings, or specimens: yes    Risk of Complications, Morbidity, and/or Mortality  General comments: Patient's history and physical is consistent with eczema flare  Discussed with patient that she needs to find a dermatologist that she can work with and continue her care  Patient is given some referrals but also told to call her insurance company to see which dermatologist will take her insurance  Patient is discharged home on long prednisone taper and topical Aquaphor for her symptoms  Patient can p r n  NSAIDs for pain  Patient requested some Flexeril, as a muscle relaxants help with her movement and skin pain  Patient is given a prescription for some Flexeril and told to have this medication refilled with her dermatologist  Patient agrees with discharge plan  Patient well appearing at time of discharge        CritCare Time    Disposition  Final diagnoses:   Eczema     Time reflects when diagnosis was documented in both MDM as applicable and the Disposition within this note     Time User Action Codes Description Comment    12/23/2017  4:18 PM Clarissa Huerta Add [L30 9] Eczema       ED Disposition     ED Disposition Condition Comment    Discharge  Con Oskar discharge to home/self care  Condition at discharge: Good        Follow-up Information     Follow up With Specialties Details Why Contact Info    Jaime Stanton DO Dermatology In 3 days  [address]  [address] [facility]  [phone]        In 2 days Please follow up with her dermatologist or call the number above if you do not have your own dermatologist          Patient's Medications   Discharge Prescriptions    CYCLOBENZAPRINE (FLEXERIL) 10 MG TABLET    Take 1 tablet by mouth 2 (two) times a day as needed for muscle spasms       Start Date: 12/23/2017End Date: --       Order Dose: 10 mg       Quantity: 20 tablet    Refills: 0    PREDNISONE 10 MG TABLET    Take 1 tablet by mouth daily Take 4 tabs daily for 4 days then   Take 3 tabs daily for 4 days then   Take 2 tabs daily for 4 days then   Take 1 tabs daily for 4 days then       Start Date: 12/23/2017End Date: --       Order Dose: 10 mg       Quantity: 40 tablet    Refills: 0     No discharge procedures on file      ED Provider  Electronically Signed by           Yamile Pinto DO  12/23/17 3358

## 2017-12-28 ENCOUNTER — GENERIC CONVERSION - ENCOUNTER (OUTPATIENT)
Dept: OTHER | Facility: OTHER | Age: 22
End: 2017-12-28

## 2017-12-29 ENCOUNTER — GENERIC CONVERSION - ENCOUNTER (OUTPATIENT)
Dept: OTHER | Facility: OTHER | Age: 22
End: 2017-12-29

## 2017-12-29 ENCOUNTER — LAB CONVERSION - ENCOUNTER (OUTPATIENT)
Dept: OTHER | Facility: OTHER | Age: 22
End: 2017-12-29

## 2017-12-29 LAB — ADDITIONAL INFORMATION (HISTORICAL): NORMAL

## 2018-01-12 VITALS
HEART RATE: 88 BPM | BODY MASS INDEX: 23.92 KG/M2 | TEMPERATURE: 98 F | OXYGEN SATURATION: 98 % | DIASTOLIC BLOOD PRESSURE: 74 MMHG | HEIGHT: 63 IN | RESPIRATION RATE: 18 BRPM | WEIGHT: 135 LBS | SYSTOLIC BLOOD PRESSURE: 120 MMHG

## 2018-01-12 VITALS
SYSTOLIC BLOOD PRESSURE: 108 MMHG | HEIGHT: 63 IN | TEMPERATURE: 98.2 F | OXYGEN SATURATION: 98 % | WEIGHT: 134.13 LBS | RESPIRATION RATE: 18 BRPM | HEART RATE: 67 BPM | BODY MASS INDEX: 23.77 KG/M2 | DIASTOLIC BLOOD PRESSURE: 78 MMHG

## 2018-01-12 NOTE — MISCELLANEOUS
Provider Comments  Provider Comments:   L/M TO CALL OFFICE TO R/S MISSED APPT LAD      Signatures   Electronically signed by : Romaine Hassan DO; Aug  7 2017  7:01AM EST                       (Author)

## 2018-01-13 NOTE — MISCELLANEOUS
Provider Comments  Provider Comments:   PATIENT MISSED APPT ON 9/11/17 MISSED APPT LETTER SENT IN MAIL      Signatures   Electronically signed by :  Jennifer Silveira, ; Sep 12 2017  9:36AM EST                       (Author)

## 2018-01-14 VITALS
RESPIRATION RATE: 16 BRPM | HEART RATE: 86 BPM | TEMPERATURE: 97.6 F | DIASTOLIC BLOOD PRESSURE: 80 MMHG | SYSTOLIC BLOOD PRESSURE: 110 MMHG | HEIGHT: 63 IN | BODY MASS INDEX: 23.39 KG/M2 | OXYGEN SATURATION: 98 % | WEIGHT: 132 LBS

## 2018-01-16 ENCOUNTER — ALLSCRIPTS OFFICE VISIT (OUTPATIENT)
Dept: OTHER | Facility: OTHER | Age: 23
End: 2018-01-16

## 2018-01-23 NOTE — PROGRESS NOTES
Assessment   1  Eczema (692 9) (L30 9)   2  Folliculitis of axilla (704 8) (L73 9)    Plan   Eczema    · PredniSONE 20 MG Oral Tablet; Take 2 tablets once daily for 5 days, then take 1    tablet once daily for 5 days    Discussion/Summary      26 yo f with : eczema flare: prednisone taper; fu derm; return 2-3 weeks for re-eval axillary folliculitis: use warm compress, return 2-3 weeks if persistent; breast exam normal and no red flags on hx case d/w Dr Marr Kinds     Possible side effects of new medications were reviewed with the patient/guardian today  The treatment plan was reviewed with the patient/guardian  The patient/guardian understands and agrees with the treatment plan      Chief Complaint   eczema flare      History of Present Illness   HPI: pt a longstanding hx of eczema all over the body, for which only takes benadryl PRN; on Dec 19, eczema has flares when it flares up to severe rash that felt like skin tightening, pruritis, more red,- she had an episode of this in November when she went ot ER for it and got prednisone, she fel the prednisone helped relieve the skin tightening feeling and the flare of the eczema; She noted that her eczema flared again yesterday, mainly around the face and neck, and arms; Pt reports she has seen derm in the past but she repors it didn't help her much, in the past she has been recommended to see a dermatologist, she has a derm referral, and has an upcoming apt with her dermatologist     SOB, no fevers, no tongue swelling, no hx of anaphylaxis, no drainage or bleeding from the rash   also noted two axillary bumps just noticed 2-3 days ago, no drainage, that she wants to get checked out, hasn't tried anything for them yet      Review of Systems        Constitutional: no fever-- and-- no chills  Cardiovascular: no chest pain,-- no palpitations-- and-- no lower extremity edema  Respiratory: no shortness of breath,-- no cough-- and-- no wheezing        Gastrointestinal: no nausea,-- no vomiting-- and-- no diarrhea  Integumentary: as noted in HPI  ROS reviewed  Active Problems   1  Asthma (493 90) (J45 909)   2  Eczema (692 9) (L30 9)   3  Influenza vaccination declined (V64 06) (Z28 21)   4  MRSA infection (041 12) (A49 02)   5  Need for influenza vaccination (V04 81) (Z23)   6  Screening for human papillomavirus (HPV) (V73 81) (Z11 51)    Past Medical History   1  History of Acute maxillary sinusitis, recurrence not specified (461 0) (J01 00)  Active Problems And Past Medical History Reviewed: The active problems and past medical history were reviewed and updated today  Family History   Mother    1  Family history of eczema (V19 4) (Z84 0)  Maternal Grandfather    2  Family history of asthma (V17 5) (Z82 5)   3  Family history of eczema (V19 4) (Z84 0)  Family History Reviewed: The family history was reviewed and updated today  Social History    · Never a smoker  The social history was reviewed and updated today  Current Meds    1  Albuterol Sulfate (2 5 MG/3ML) 0 083% Inhalation Nebulization Solution; USE 1 UNIT     DOSE IN NEBULIZER EVERY 6 HOURS AS NEEDED; Therapy: [id number] to (Last Beti Pinto)  Requested for: [id number]; Status:     ACTIVE - Transmit to Pharmacy - Awaiting Verification Ordered   2  PredniSONE TABS; Therapy: (Recorded:[id number]) to Recorded   3  ProAir  (90 Base) MCG/ACT Inhalation Aerosol Solution; inhale 2 puffs by mouth     four times a day if needed; Last Rx:[id number] Ordered     The medication list was reviewed and updated today  Allergies   1  No Known Drug Allergies  2  Peanuts    Vitals    Recorded: [id number] 01:28PM   Heart Rate 81   Respiration 18   Systolic 353   Diastolic 80   Height 5 ft 3 in   Weight 156 lb    BMI Calculated 27 63   BSA Calculated 1 74   O2 Saturation 100     Physical Exam        Constitutional      General appearance: No acute distress, well appearing and well nourished  Ears, Nose, Mouth, and Throat      External inspection of ears and nose: Normal        Otoscopic examination: Tympanic membranes translucent with normal light reflex  Canals patent without erythema  Nasal mucosa, septum, and turbinates: Normal without edema or erythema  Oropharynx: Normal with no erythema, edema, exudate or lesions  Pulmonary      Respiratory effort: No increased work of breathing or signs of respiratory distress  Auscultation of lungs: Clear to auscultation  Cardiovascular      Auscultation of heart: Normal rate and rhythm, normal S1 and S2, without murmurs  Examination of extremities for edema and/or varicosities: Normal        Abdomen      Abdomen: Non-tender, no masses  Liver and spleen: No hepatomegaly or splenomegaly  Lymphatic      Palpation of lymph nodes in neck: No lymphadenopathy  Musculoskeletal      Gait and station: Normal        Skin erythematous patches of dry skin over face, arms, and neck -- no notable edema, no tongue swelling  Psychiatric      Mood and affect: Normal        Additional Exam:  2 small <5 3TQ folliculitis/cyst like mobile, mildly tender structures, in R  axilla; breast exam wnl b/l, no other axillary abnormal masses (breast exam done with RN)           Signatures    Electronically signed by : Lucian Kim DO; Jan 22 2018  7:57AM EST                       (Author)     Electronically signed by : ROGELIO Davison ; Jan 22 2018 11:19AM EST

## 2018-01-23 NOTE — MISCELLANEOUS
Provider Comments  Provider Comments:   CALLED PT FOR NOT SHOW, L/M TO RESCHEDULED            Signatures   Electronically signed by : ROGELIO Cleary ; Jan 4 2018  1:00PM EST                       (Author)

## 2018-01-24 VITALS
HEIGHT: 63 IN | HEART RATE: 81 BPM | RESPIRATION RATE: 18 BRPM | DIASTOLIC BLOOD PRESSURE: 80 MMHG | OXYGEN SATURATION: 100 % | BODY MASS INDEX: 27.64 KG/M2 | WEIGHT: 156 LBS | SYSTOLIC BLOOD PRESSURE: 120 MMHG

## 2018-01-24 VITALS
DIASTOLIC BLOOD PRESSURE: 64 MMHG | OXYGEN SATURATION: 100 % | WEIGHT: 156 LBS | SYSTOLIC BLOOD PRESSURE: 120 MMHG | HEIGHT: 63 IN | RESPIRATION RATE: 18 BRPM | HEART RATE: 93 BPM | BODY MASS INDEX: 27.64 KG/M2

## 2018-01-26 ENCOUNTER — OFFICE VISIT (OUTPATIENT)
Dept: FAMILY MEDICINE CLINIC | Facility: CLINIC | Age: 23
End: 2018-01-26
Payer: COMMERCIAL

## 2018-01-26 VITALS
RESPIRATION RATE: 20 BRPM | OXYGEN SATURATION: 98 % | WEIGHT: 158 LBS | BODY MASS INDEX: 27.99 KG/M2 | SYSTOLIC BLOOD PRESSURE: 120 MMHG | HEART RATE: 99 BPM | DIASTOLIC BLOOD PRESSURE: 70 MMHG

## 2018-01-26 DIAGNOSIS — L73.9 FOLLICULITIS: ICD-10-CM

## 2018-01-26 DIAGNOSIS — L30.9 ECZEMA, UNSPECIFIED TYPE: Primary | ICD-10-CM

## 2018-01-26 PROBLEM — J45.909 ASTHMA: Status: ACTIVE | Noted: 2017-08-03

## 2018-01-26 PROCEDURE — 99213 OFFICE O/P EST LOW 20 MIN: CPT | Performed by: FAMILY MEDICINE

## 2018-01-26 RX ORDER — MEDROXYPROGESTERONE ACETATE 150 MG/ML
INJECTION, SUSPENSION INTRAMUSCULAR
COMMUNITY
Start: 2017-12-18 | End: 2018-12-27 | Stop reason: ALTCHOICE

## 2018-01-26 NOTE — PROGRESS NOTES
Reviewed case and note with resident  Was in the clinic, patient was not seen by me  Agree with resident note

## 2018-01-26 NOTE — ASSESSMENT & PLAN NOTE
- folliculitis in R  Axilla present on last visit now resolved  - currently she has similar folliculitis spots in L   Axilla -- recommend refraining from anything that irritates the skin, keep the area dry, and use warm compress for these to heal faster; if any fevers/systemic signs of infection, seek immediate medical care; also return for re-eval if they don't resolve in 1-2 weeks

## 2018-01-26 NOTE — PROGRESS NOTES
Assessment/Plan:    Eczema  - Eczema flare present on last visit resolved;  - pt established care with Dermatologist Dr Miguel Parnell, high dose steroids ordered as well as injection for eczema ordered by him and currently in works of getting approved; advised her that until then use good moisture for skin  - return PRN    Folliculitis  - folliculitis in R  Axilla present on last visit now resolved  - currently she has similar folliculitis spots in L  Axilla -- recommend refraining from anything that irritates the skin, keep the area dry, and use warm compress for these to heal faster; if any fevers/systemic signs of infection, seek immediate medical care; also return for re-eval if they don't resolve in 1-2 weeks        -- return in 3-4 weeks for f/u chronic conditions     Diagnoses and all orders for this visit:    Eczema, unspecified type    Folliculitis    Other orders  -     MedroxyPROGESTERone Acetate 150 MG/ML JIN;         CC: eczema f/u and folliculitis f/u    Subjective:      Patient ID: Salome Galeas is a 25 y o  female  Pt is here to follow up for her eczema flare that she had earlier this month  Pt reports symptoms of flare have now resolved after she took the prednisone taper which she has completed  Pt states she now has residual eczema which is mostly dry skin and pruritic, pt has chronic hx of this  Pt just established care with dermatology - Dr Miguel Parnell earlier this month - he has ordered high dose steroid cream for her as well as an injection for eczema that she can't recall name of - currently she states that derm office is working on getting these meds approved by her insurance  Pt also has hx of folliculitis in R axilla from prior visit about 2 weeks ago that has now resolved  Pt has hx of recurrent axillary folliculitis that usually resolve in couple days  Currently she states she has 3 similar bumps in L  Axilla now that just started 2 days ago   No fevers/chills, no other complaints, feels ok otherwise  No breast lumps/bumps/erythema/nipple drainage or any other abnormality noted  The following portions of the patient's history were reviewed and updated as appropriate: allergies, current medications, past family history, past medical history, past social history, past surgical history and problem list     Review of Systems   Constitutional: Negative for chills, diaphoresis and fever  HENT: Negative for congestion and sore throat  Respiratory: Negative for shortness of breath and wheezing  Cardiovascular: Negative for chest pain, palpitations and leg swelling  Gastrointestinal: Negative for abdominal pain, diarrhea, nausea and vomiting  Objective:    /70   Pulse 99   Resp 20   Wt 71 7 kg (158 lb)   SpO2 98%   BMI 27 99 kg/m²        Physical Exam   Constitutional: No distress  HENT:   Head: Normocephalic and atraumatic  Right Ear: External ear normal    Left Ear: External ear normal    Eyes: Conjunctivae are normal    Neck: Neck supple  Cardiovascular: Normal rate, regular rhythm, normal heart sounds and intact distal pulses  No murmur heard  Pulmonary/Chest: Effort normal and breath sounds normal  No respiratory distress  She has no wheezes  She has no rales  Abdominal: Soft  Bowel sounds are normal  She exhibits no distension  There is no tenderness  There is no rebound  Musculoskeletal: She exhibits no edema, tenderness or deformity  Neurological: She is alert  Skin: Skin is warm and dry  She is not diaphoretic  Patches of dry skin all over both arms; not warm to touch; also has hypopigmented areas on both arms    R  Axilla: normal   L   Axilla: three 9-4SS folliculitis structures noted, mobile, slightly tender to palpation, non-erythematous and no fluctuance, no drainage    (no breast exam done at this visit as I just performed a complete breast exam at prior visit earlier this month and it was normal)   Psychiatric: She has a normal mood and affect

## 2018-01-26 NOTE — ASSESSMENT & PLAN NOTE
- Eczema flare present on last visit resolved;  - pt established care with Dermatologist Dr Carley Mcneil, high dose steroids ordered as well as injection for eczema ordered by him and currently in works of getting approved; advised her that until then use good moisture for skin  - return PRN

## 2018-01-29 ENCOUNTER — HOSPITAL ENCOUNTER (EMERGENCY)
Facility: HOSPITAL | Age: 23
Discharge: HOME | End: 2018-01-29
Attending: EMERGENCY MEDICINE | Admitting: EMERGENCY MEDICINE
Payer: COMMERCIAL

## 2018-01-29 VITALS
HEART RATE: 100 BPM | WEIGHT: 156 LBS | TEMPERATURE: 98.4 F | HEIGHT: 63 IN | OXYGEN SATURATION: 98 % | SYSTOLIC BLOOD PRESSURE: 139 MMHG | DIASTOLIC BLOOD PRESSURE: 63 MMHG | RESPIRATION RATE: 18 BRPM | BODY MASS INDEX: 27.64 KG/M2

## 2018-01-29 DIAGNOSIS — L30.9 ECZEMA: Primary | ICD-10-CM

## 2018-01-29 PROCEDURE — 99283 EMERGENCY DEPT VISIT LOW MDM: CPT

## 2018-01-29 RX ORDER — PREDNISONE 10 MG/1
TABLET ORAL
Qty: 31 TABLET | Refills: 0 | Status: SHIPPED | OUTPATIENT
Start: 2018-01-29 | End: 2018-03-30 | Stop reason: ALTCHOICE

## 2018-01-29 NOTE — DISCHARGE INSTRUCTIONS
Dyshidrotic Eczema   WHAT YOU NEED TO KNOW:   Dyshidrotic eczema is a recurrent skin rash with small fluid-filled blisters  The blisters appear on palms of your hands, on the sides of your fingers, and soles of your feet  The exact cause is unknown  Your risk may be increased if you have allergies, you smoke, or have other skin conditions, such as atopic dermatitis  Your risk may also increase if you eat a diet high in metal salts  Foods such as mushrooms, chocolate and coffee contain metal salts  DISCHARGE INSTRUCTIONS:   Contact your healthcare provider if:   · The area of your rash is swollen, painful, draining fluid, and feels hot  · The blisters have yellow crust on them  · You have questions or concerns about your condition or care  Medicines:   · Medicines  help decrease itching and how long you have a rash  This medicine may be a pill or cream  You may also need medicine to treat an infection  · Take your medicine as directed  Contact your healthcare provider if you think your medicine is not helping or if you have side effects  Tell him of her if you are allergic to any medicine  Keep a list of the medicines, vitamins, and herbs you take  Include the amounts, and when and why you take them  Bring the list or the pill bottles to follow-up visits  Carry your medicine list with you in case of an emergency  Care for your rash:   · Do not scratch your rash  Bacteria from your fingernails may enter your open sores during scratching and cause an infection  · Use moisturizes or emollients, such as petroleum jelly  These help relieve itching and help prevent bacteria from getting in your sores  If you have a doctor's order for medicated cream, apply that first  Then apply the moisturizer or emollient on top  · Wear cotton socks and gloves  Keke Epstein keeps your rash dry, which helps with itching  Gloves and socks help your medicated cream work better      · Ask your healthcare provider how often you should wash your hands  You may need to wash them less often while they heal  Do not use hand  with ethyl alcohol  · Ask your healthcare provider if you need allergy testing  Allergy testing may help identify what triggers your eczema  Prevent the return of your rash:   · Identify and avoid possible triggers  · Avoid sweating more than normal      · Find ways to handle stress  · Decrease the amount of metal salts in your diet  Avoid onions, tomatoes, beans and beer  Ask your healthcare provider what other foods you should avoid  · Do not smoke  If you smoke, it is never too late to quit  Ask for information if you need help quitting  Follow up with your healthcare provider as directed:  Write down your questions so you remember to ask them during your visits  © 2017 [facility] Information is for End User's use only and may not be sold, redistributed or otherwise used for commercial purposes  All illustrations and images included in CareNotes® are the copyrighted property of A D A M , Inc  or Macario Larkin  The above information is an  only  It is not intended as medical advice for individual conditions or treatments  Talk to your doctor, nurse or pharmacist before following any medical regimen to see if it is safe and effective for you

## 2018-02-01 NOTE — ED PROVIDER NOTES
History  Chief Complaint   Patient presents with    Rash     Has had a rash for 2 weeks, has had 2 rounds of prednisone, 2 days ago skin statred to "leaking"     Muscle Pain     Has had generalized muscle pain for 2 days     Patient presents for evaluation of skin rash and pain  Was at dermatologist on  and was prescribed cream and another medication  However, her insurance did not cover it  Using Vaseline on the skin only  History provided by:  Patient   used: No    Rash   Muscle Pain   Associated symptoms: rash        Prior to Admission Medications   Prescriptions Last Dose Informant Patient Reported? Taking? MedroxyPROGESTERone Acetate 150 MG/ML JIN   Yes No   albuterol (2 5 mg/3 mL) 0 083 % nebulizer solution   No No   Sig: Take 3 mL by nebulization every 6 (six) hours as needed for wheezing   albuterol (PROVENTIL HFA,VENTOLIN HFA) 90 mcg/act inhaler   No No   Sig: Inhale 2 puffs every 4 (four) hours as needed for wheezing   cyclobenzaprine (FLEXERIL) 10 mg tablet   No No   Sig: Take 1 tablet by mouth 2 (two) times a day as needed for muscle spasms      Facility-Administered Medications: None       Past Medical History:   Diagnosis Date    Asthma     Eczema        Past Surgical History:   Procedure Laterality Date     SECTION             Family History   Problem Relation Age of Onset    Eczema Mother     Eczema Maternal Grandfather     Asthma Maternal Grandfather      I have reviewed and agree with the history as documented  Social History   Substance Use Topics    Smoking status: Never Smoker    Smokeless tobacco: Never Used    Alcohol use No        Review of Systems   Skin: Positive for rash  All other systems reviewed and are negative        Physical Exam  ED Triage Vitals [18 1136]   Temperature Pulse Respirations Blood Pressure SpO2   98 4 °F (36 9 °C) 100 18 139/63 98 %      Temp Source Heart Rate Source Patient Position - Orthostatic VS BP Location FiO2 (%)   Tympanic Monitor Sitting Right arm --      Pain Score       9           Orthostatic Vital Signs  Vitals:    01/29/18 1136   BP: 139/63   Pulse: 100   Patient Position - Orthostatic VS: Sitting       Physical Exam   Constitutional: She is oriented to person, place, and time  No distress  Cardiovascular: Normal rate, regular rhythm and intact distal pulses  Pulmonary/Chest: Effort normal and breath sounds normal  No respiratory distress  Musculoskeletal: Normal range of motion  Neurological: She is alert and oriented to person, place, and time  Skin: Rash noted  She is not diaphoretic  Nursing note and vitals reviewed  ED Medications  Medications - No data to display    Diagnostic Studies  Results Reviewed     None                 No orders to display              Procedures  Procedures       Phone Contacts  ED Phone Contact    ED Course  ED Course                                MDM  Number of Diagnoses or Management Options  Eczema:   Diagnosis management comments: Pulse ox 98% on RA indicating adequate oxygenation    Similar in appearance to previous visits to the ER  Patient states the oral steroids have helped in the past  Advised on developing skin care routine  Vaseline can be a barrier cream needed to be using moisturizer  Recommending getting otc cream if unable to get the prescription  Will restart oral steroids  Dr Emanuel Franco contacted and case discussed no other suggestions for treatment if she cant get the prescription  Advised the patient to work with her doctor and insurance company to get the medication and to be persistent          Amount and/or Complexity of Data Reviewed  Decide to obtain previous medical records or to obtain history from someone other than the patient: yes  Review and summarize past medical records: yes  Discuss the patient with other providers: yes    Patient Progress  Patient progress: stable    CritCare Time    Disposition  Final diagnoses:   Eczema     Time reflects when diagnosis was documented in both MDM as applicable and the Disposition within this note     Time User Action Codes Description Comment    1/29/2018  1:11 PM Jennifer Zheng Add [L30 9] Eczema       ED Disposition     ED Disposition Condition Comment    Discharge  Daril Efrain discharge to home/self care  Condition at discharge: stable        Follow-up Information     Follow up With Specialties Details Why Bello Wilson MD Dermatology In 1 week  [facility], [address] [place] [address]  [phone]          Discharge Medication List as of 1/29/2018  1:15 PM      START taking these medications    Details   predniSONE 10 mg tablet 4 tabs for 4 days, 3 tabs for 3 days, 2 tabs for 2 days, 1 tab for 2 days, Print         CONTINUE these medications which have NOT CHANGED    Details   albuterol (2 5 mg/3 mL) 0 083 % nebulizer solution Take 3 mL by nebulization every 6 (six) hours as needed for wheezing, Starting u 10/5/2017, Print      albuterol (PROVENTIL HFA,VENTOLIN HFA) 90 mcg/act inhaler Inhale 2 puffs every 4 (four) hours as needed for wheezing, Starting Thu 10/5/2017, Print      cyclobenzaprine (FLEXERIL) 10 mg tablet Take 1 tablet by mouth 2 (two) times a day as needed for muscle spasms, Starting Sat 12/23/2017, Print      MedroxyPROGESTERone Acetate 150 MG/ML JIN Starting Mon 12/18/2017, Historical Med           No discharge procedures on file      ED Provider  Electronically Signed by           Donell Smith DO  01/31/18 [id number]

## 2018-03-27 ENCOUNTER — TELEPHONE (OUTPATIENT)
Dept: FAMILY MEDICINE CLINIC | Facility: CLINIC | Age: 23
End: 2018-03-27

## 2018-03-30 ENCOUNTER — OFFICE VISIT (OUTPATIENT)
Dept: FAMILY MEDICINE CLINIC | Facility: CLINIC | Age: 23
End: 2018-03-30
Payer: COMMERCIAL

## 2018-03-30 VITALS
BODY MASS INDEX: 31.18 KG/M2 | RESPIRATION RATE: 18 BRPM | WEIGHT: 176 LBS | DIASTOLIC BLOOD PRESSURE: 60 MMHG | OXYGEN SATURATION: 98 % | HEART RATE: 97 BPM | SYSTOLIC BLOOD PRESSURE: 108 MMHG | HEIGHT: 63 IN

## 2018-03-30 DIAGNOSIS — M79.671 PAIN IN BOTH FEET: Primary | ICD-10-CM

## 2018-03-30 DIAGNOSIS — Z23 NEED FOR TDAP VACCINATION: ICD-10-CM

## 2018-03-30 DIAGNOSIS — M79.672 PAIN IN BOTH FEET: Primary | ICD-10-CM

## 2018-03-30 DIAGNOSIS — M25.511 BILATERAL SHOULDER PAIN, UNSPECIFIED CHRONICITY: ICD-10-CM

## 2018-03-30 DIAGNOSIS — M25.512 BILATERAL SHOULDER PAIN, UNSPECIFIED CHRONICITY: ICD-10-CM

## 2018-03-30 PROCEDURE — 90471 IMMUNIZATION ADMIN: CPT | Performed by: FAMILY MEDICINE

## 2018-03-30 PROCEDURE — 99214 OFFICE O/P EST MOD 30 MIN: CPT | Performed by: FAMILY MEDICINE

## 2018-03-30 PROCEDURE — 3008F BODY MASS INDEX DOCD: CPT | Performed by: FAMILY MEDICINE

## 2018-03-30 PROCEDURE — 90715 TDAP VACCINE 7 YRS/> IM: CPT | Performed by: FAMILY MEDICINE

## 2018-03-30 RX ORDER — NAPROXEN 500 MG/1
250 TABLET ORAL 2 TIMES DAILY WITH MEALS
Qty: 30 TABLET | Refills: 0 | Status: SHIPPED | OUTPATIENT
Start: 2018-03-30 | End: 2018-09-29 | Stop reason: SDUPTHER

## 2018-03-30 RX ORDER — DUPILUMAB 300 MG/2ML
INJECTION, SOLUTION SUBCUTANEOUS
COMMUNITY
Start: 2018-02-28 | End: 2018-09-25 | Stop reason: HOSPADM

## 2018-03-30 RX ORDER — HALOBETASOL PROPIONATE 0.05 %
OINTMENT (GRAM) TOPICAL
COMMUNITY
Start: 2018-01-30 | End: 2018-09-25 | Stop reason: HOSPADM

## 2018-03-30 RX ORDER — ALBUTEROL SULFATE 90 UG/1
2 AEROSOL, METERED RESPIRATORY (INHALATION) 4 TIMES DAILY
COMMUNITY
End: 2018-04-11 | Stop reason: SDUPTHER

## 2018-03-30 NOTE — PROGRESS NOTES
Assessment/Plan:     Diagnoses and all orders for this visit:    Pain in both feet  -     naproxen (NAPROSYN) 500 mg tablet; Take 0 5 tablets (250 mg total) by mouth 2 (two) times a day with meals    Bilateral shoulder pain, unspecified chronicity  -     Comprehensive metabolic panel; Future  -     Sedimentation rate, automated; Future  -     TSH, 3rd generation; Future    Other orders  -     albuterol (PROAIR HFA) 90 mcg/act inhaler; Inhale 2 puffs 4 (four) times a day  -     halobetasol (ULTRAVATE) 0 05 % ointment;   -     DUPIXENT 300 MG/2ML SOSY;           Subjective:      Patient ID: Shannon Hall is a 25 y o  female  Patient is here today with a complaint of bilateral feet and bilateral shoulder pain for 1 month  As per patient he described the pain dull, 8/10 on intensity in the morning and it gets better after 2-3 hours in bilateral feet  Patient denies any tingling or numbness,  No history of trauma or injury  She also has associated bilateral shoulder pain for the same duration which is 5/10 on intensity  She took aspirin and Tylenol which did not help  As per patient she is also losing hair and gained weight since July last year  The following portions of the patient's history were reviewed and updated as appropriate: allergies, current medications, past family history, past medical history, past social history, past surgical history and problem list     Review of Systems   Constitutional: Negative for chills and fever  HENT: Negative for congestion, ear discharge and ear pain  Eyes: Negative for photophobia and visual disturbance  Respiratory: Negative for cough and shortness of breath  Cardiovascular: Negative for palpitations and leg swelling  Endocrine: Negative for cold intolerance, heat intolerance, polydipsia, polyphagia and polyuria          Hair fall,  Weight gain   Musculoskeletal:         Bilateral feet pain   bilateral shoulder pain   Hematological: Negative for adenopathy  Does not bruise/bleed easily  Psychiatric/Behavioral: Negative for agitation and behavioral problems  Objective:      /60 (BP Location: Left arm, Patient Position: Sitting)   Pulse 97   Resp 18   Ht 5' 3" (1 6 m)   Wt 79 8 kg (176 lb)   SpO2 98%   BMI 31 18 kg/m²          Physical Exam   Constitutional: She appears well-developed and well-nourished  HENT:   Head: Normocephalic and atraumatic  Cardiovascular: Normal rate, regular rhythm and normal heart sounds  No murmur heard  Pulmonary/Chest: Effort normal and breath sounds normal  No respiratory distress  Musculoskeletal: Normal range of motion  She exhibits no edema  Psychiatric: She has a normal mood and affect   Her behavior is normal

## 2018-04-04 LAB
ALBUMIN SERPL-MCNC: 4.3 G/DL (ref 3.5–5.5)
ALBUMIN/GLOB SERPL: 1.4 {RATIO} (ref 1.2–2.2)
ALP SERPL-CCNC: 64 IU/L (ref 39–117)
ALT SERPL-CCNC: 16 IU/L (ref 0–32)
AMBIG ABBREV DEFAULT: NORMAL
AST SERPL-CCNC: 28 IU/L (ref 0–40)
BILIRUB SERPL-MCNC: 0.5 MG/DL (ref 0–1.2)
BUN SERPL-MCNC: 7 MG/DL (ref 6–20)
BUN/CREAT SERPL: 10 (ref 9–23)
CALCIUM SERPL-MCNC: 9.4 MG/DL (ref 8.7–10.2)
CHLORIDE SERPL-SCNC: 103 MMOL/L (ref 96–106)
CO2 SERPL-SCNC: 24 MMOL/L (ref 18–29)
CREAT SERPL-MCNC: 0.73 MG/DL (ref 0.57–1)
ERYTHROCYTE [SEDIMENTATION RATE] IN BLOOD BY WESTERGREN METHOD: 2 MM/HR (ref 0–32)
GLOBULIN SER-MCNC: 3.1 G/DL (ref 1.5–4.5)
GLUCOSE SERPL-MCNC: 94 MG/DL (ref 65–99)
POTASSIUM SERPL-SCNC: 4.8 MMOL/L (ref 3.5–5.2)
PROT SERPL-MCNC: 7.4 G/DL (ref 6–8.5)
SL AMB EGFR AFRICAN AMERICAN: 136 ML/MIN/1.73
SL AMB EGFR NON AFRICAN AMERICAN: 118 ML/MIN/1.73
SODIUM SERPL-SCNC: 142 MMOL/L (ref 134–144)
TSH SERPL DL<=0.005 MIU/L-ACNC: 1.24 UIU/ML (ref 0.45–4.5)

## 2018-04-09 ENCOUNTER — TELEPHONE (OUTPATIENT)
Dept: FAMILY MEDICINE CLINIC | Facility: CLINIC | Age: 23
End: 2018-04-09

## 2018-04-11 DIAGNOSIS — J45.909 UNCOMPLICATED ASTHMA, UNSPECIFIED ASTHMA SEVERITY, UNSPECIFIED WHETHER PERSISTENT: Primary | ICD-10-CM

## 2018-04-13 ENCOUNTER — TELEPHONE (OUTPATIENT)
Dept: FAMILY MEDICINE CLINIC | Facility: CLINIC | Age: 23
End: 2018-04-13

## 2018-04-16 ENCOUNTER — TELEPHONE (OUTPATIENT)
Dept: FAMILY MEDICINE CLINIC | Facility: CLINIC | Age: 23
End: 2018-04-16

## 2018-04-16 NOTE — TELEPHONE ENCOUNTER
Called patient regarding lab results  Left a vm saying that lab results are within normal range  If she has further question then can call us back

## 2018-04-20 NOTE — TELEPHONE ENCOUNTER
Pt called re blood work results,i relayed Gabi said she needs to speak to you,still having symptoms

## 2018-05-15 ENCOUNTER — OFFICE VISIT (OUTPATIENT)
Dept: FAMILY MEDICINE CLINIC | Facility: CLINIC | Age: 23
End: 2018-05-15
Payer: COMMERCIAL

## 2018-05-15 VITALS
HEART RATE: 72 BPM | SYSTOLIC BLOOD PRESSURE: 120 MMHG | DIASTOLIC BLOOD PRESSURE: 90 MMHG | RESPIRATION RATE: 16 BRPM | TEMPERATURE: 99.2 F | WEIGHT: 178 LBS | OXYGEN SATURATION: 97 % | BODY MASS INDEX: 31.53 KG/M2

## 2018-05-15 DIAGNOSIS — J45.909 UNCOMPLICATED ASTHMA, UNSPECIFIED ASTHMA SEVERITY, UNSPECIFIED WHETHER PERSISTENT: ICD-10-CM

## 2018-05-15 DIAGNOSIS — J40 BRONCHITIS: Primary | ICD-10-CM

## 2018-05-15 DIAGNOSIS — M79.671 PAIN IN BOTH FEET: ICD-10-CM

## 2018-05-15 DIAGNOSIS — M79.672 PAIN IN BOTH FEET: ICD-10-CM

## 2018-05-15 PROCEDURE — 99213 OFFICE O/P EST LOW 20 MIN: CPT | Performed by: FAMILY MEDICINE

## 2018-05-15 RX ORDER — ALBUTEROL SULFATE 90 UG/1
2 AEROSOL, METERED RESPIRATORY (INHALATION) 4 TIMES DAILY
Qty: 18 G | Refills: 5 | Status: SHIPPED | OUTPATIENT
Start: 2018-05-15 | End: 2018-05-16

## 2018-05-15 NOTE — PROGRESS NOTES
Assessment/Plan:    No problem-specific Assessment & Plan notes found for this encounter  Diagnoses and all orders for this visit:    Bronchitis    Uncomplicated asthma, unspecified asthma severity, unspecified whether persistent  -     albuterol (PROAIR HFA) 90 mcg/act inhaler; Inhale 2 puffs 4 (four) times a day  -     Spirometry pre and post bronchodilator; Future    Pain in both feet  -     Ambulatory referral to Physical Therapy; Future          # bronchitis  - No physical abnormalities noted on examination  Patient has remained afebrile, has a cough, no lymphadenopathy, no pharyngeal erythema or exudate noted on exam today  Likely viral  No need for antibiotics at this time  - Counseled patient to monitor for fevers  - Counseled the patient on supportive care which includes honey/lemon tea, warm water, salt water gargle  - Counseled patient on hand washing hygiene  - Discussed with patient that cough may persist for up to 6-8 weeks  Patient verbalized understanding  - Counseled patient to increase hydration  - Return precautions given      # asthma, uncomplicated  -refilled patient's ProAir she has been tolerating it well  Counseled patient on inhaler hygiene, treatment compliance, and side effects of medication  Patient reports did spirometry two years ago by a doctor in [place]  Had patient sign consent for medical release and will follow up with spirometry results  I did give patient a requisition for spirometry testing to monitor the severity of her asthma as today's visit  Discussed with patient to have this scheduled in one month once her bronchitis has resolved  Patient verbalized understanding    #Pain in bilateral feet  -discussed with patient likely musculoskeletal, stress related as patient is constantly on her feet  Therefore given patient requisition for physical therapy to strengthen calf and lower extremity muscles two to 3 times a week for the next 4 to 6 weeks    Discussed with patient to take ibuprofen 800 milligrams over-the-counter every 6 hours for the next week to decrease any inflammation  Discussed patient to use orthotics as needed  -patient had sedimentation rate, TSH, CMP done which was within normal limits  -discussed with patient if the pain does not improve, to come back in for re-evaluation at that time and give patient requisition to podiatry for further recommendations  # RTO in 4-6 weeks for follow up bilateral feet      Subjective:      Patient ID: Kaelyn Cleary is a 26 y o  female with past medical history of asthma who is on a nebulizer as needed and ProAir who presents today with complaints of a dry cough x 1 month  Patient reports no new medications noted  Patient reports hasn't been out of the country  Patient reports needs refills on her ProAir as she ran out about a few days ago, therefore used her nebulizer yesterday  Patient normally doesn't use her nebulizer  Patient reports had a spirometry test done in [place], [place] 2 years ago  Patient denies any fever, chills  Patient complains of sore throat, rhinorrhea  Daughter has similar symptoms  Patient has been taking OTC Dayquil and Nyquil with no relief of symptoms  Denies /GI symptoms  Patient is able to tolerate oral intake  Patient reports difficulty standing on her feet due to pain which she describes as "pressure like" which started three months ago  Patient reports pain is constant, localized to the feet, with an intensity of 9/10  Patient reports hasn't taking anything for the pain  Patient was prescribed naprosyn however she reports she didn't take it  No history of injury/trauma  No alleviating or aggravating factors  Patient had blood work done and was done its normal  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         HPI    The following portions of the patient's history were reviewed and updated as appropriate:   She  has a past medical history of Asthma and Eczema  She   Patient Active Problem List    Diagnosis Date Noted    Bronchitis 05/15/2018    Pain in both feet     Folliculitis 15/99/4776    Eczema 2018    Asthma 2017     She  has a past surgical history that includes  section  Her family history includes Asthma in her maternal grandfather; Eczema in her maternal grandfather and mother  She  reports that she has never smoked  She has never used smokeless tobacco  She reports that she does not drink alcohol or use drugs  Current Outpatient Prescriptions   Medication Sig Dispense Refill    albuterol (2 5 mg/3 mL) 0 083 % nebulizer solution Take 3 mL by nebulization every 6 (six) hours as needed for wheezing 75 mL 0    albuterol (PROAIR HFA) 90 mcg/act inhaler Inhale 2 puffs 4 (four) times a day 18 g 5    DUPIXENT 300 MG/2ML SOSY       halobetasol (ULTRAVATE) 0 05 % ointment       MedroxyPROGESTERone Acetate 150 MG/ML JIN       naproxen (NAPROSYN) 500 mg tablet Take 0 5 tablets (250 mg total) by mouth 2 (two) times a day with meals 30 tablet 0     No current facility-administered medications for this visit  Current Outpatient Prescriptions on File Prior to Visit   Medication Sig    albuterol (2 5 mg/3 mL) 0 083 % nebulizer solution Take 3 mL by nebulization every 6 (six) hours as needed for wheezing    DUPIXENT 300 MG/2ML SOSY     halobetasol (ULTRAVATE) 0 05 % ointment     MedroxyPROGESTERone Acetate 150 MG/ML JIN     naproxen (NAPROSYN) 500 mg tablet Take 0 5 tablets (250 mg total) by mouth 2 (two) times a day with meals    [DISCONTINUED] PROAIR  (90 Base) MCG/ACT inhaler INHALE TWO PUFFS BY MOUTH 4 TIMES DAILY     No current facility-administered medications on file prior to visit  She is allergic to peanuts [peanut oil]       Review of Systems   Constitutional: Negative for activity change, appetite change, chills and fever  HENT: Positive for rhinorrhea   Negative for ear discharge, ear pain, sinus pain, sinus pressure, sneezing, sore throat and voice change  Respiratory: Positive for cough (chronic)  Negative for shortness of breath and wheezing  Cardiovascular: Negative for chest pain and palpitations  Gastrointestinal: Negative for abdominal pain, diarrhea, nausea and vomiting  Genitourinary: Negative for decreased urine volume, dysuria and urgency  Musculoskeletal: Negative for gait problem and joint swelling  Skin: Negative for pallor and rash  Neurological: Negative for weakness, light-headedness, numbness and headaches  Objective:      /90 (BP Location: Right arm, Patient Position: Sitting, Cuff Size: Large)   Pulse 72   Temp 99 2 °F (37 3 °C) (Tympanic)   Resp 16   Wt 80 7 kg (178 lb)   SpO2 97%   BMI 31 53 kg/m²          Physical Exam   Constitutional: She is oriented to person, place, and time  She appears well-developed and well-nourished  No distress  HENT:   Head: Normocephalic and atraumatic  Right Ear: External ear normal    Left Ear: External ear normal    Nose: Nose normal    Mouth/Throat: Oropharynx is clear and moist  No oropharyngeal exudate  Eyes: Conjunctivae are normal  Right eye exhibits no discharge  Left eye exhibits no discharge  Cardiovascular: Normal rate, regular rhythm, normal heart sounds and intact distal pulses  Exam reveals no gallop and no friction rub  No murmur heard  Pulmonary/Chest: Effort normal and breath sounds normal  No respiratory distress  She has no wheezes  She has no rales  Abdominal: Soft  Bowel sounds are normal  She exhibits no distension  There is no tenderness  There is no rebound  Musculoskeletal:        Right foot: Normal         Left foot: Normal    Neurological: She is alert and oriented to person, place, and time  Skin: Skin is warm and dry  She is not diaphoretic  Psychiatric: She has a normal mood and affect   Her behavior is normal  Judgment and thought content normal    Nursing note and vitals reviewed          Eran Rivas

## 2018-05-16 ENCOUNTER — APPOINTMENT (EMERGENCY)
Dept: RADIOLOGY | Facility: HOSPITAL | Age: 23
End: 2018-05-16
Payer: COMMERCIAL

## 2018-05-16 ENCOUNTER — HOSPITAL ENCOUNTER (EMERGENCY)
Facility: HOSPITAL | Age: 23
Discharge: HOME | End: 2018-05-16
Attending: EMERGENCY MEDICINE | Admitting: EMERGENCY MEDICINE
Payer: COMMERCIAL

## 2018-05-16 VITALS
WEIGHT: 170 LBS | SYSTOLIC BLOOD PRESSURE: 136 MMHG | TEMPERATURE: 98.9 F | OXYGEN SATURATION: 98 % | HEIGHT: 63 IN | BODY MASS INDEX: 30.12 KG/M2 | HEART RATE: 93 BPM | RESPIRATION RATE: 20 BRPM | DIASTOLIC BLOOD PRESSURE: 63 MMHG

## 2018-05-16 DIAGNOSIS — J40 BRONCHITIS: ICD-10-CM

## 2018-05-16 DIAGNOSIS — J45.909 UNCOMPLICATED ASTHMA, UNSPECIFIED ASTHMA SEVERITY, UNSPECIFIED WHETHER PERSISTENT: ICD-10-CM

## 2018-05-16 DIAGNOSIS — J20.9 ACUTE BRONCHITIS: Primary | ICD-10-CM

## 2018-05-16 PROCEDURE — 99283 EMERGENCY DEPT VISIT LOW MDM: CPT

## 2018-05-16 PROCEDURE — 71046 X-RAY EXAM CHEST 2 VIEWS: CPT

## 2018-05-16 RX ORDER — PREDNISONE 20 MG/1
60 TABLET ORAL ONCE
Status: COMPLETED | OUTPATIENT
Start: 2018-05-16 | End: 2018-05-16

## 2018-05-16 RX ORDER — BENZONATATE 100 MG/1
100 CAPSULE ORAL 3 TIMES DAILY PRN
Qty: 20 CAPSULE | Refills: 0 | Status: SHIPPED | OUTPATIENT
Start: 2018-05-16 | End: 2018-11-27

## 2018-05-16 RX ORDER — GUAIFENESIN/DEXTROMETHORPHAN 100-10MG/5
10 SYRUP ORAL ONCE
Status: COMPLETED | OUTPATIENT
Start: 2018-05-16 | End: 2018-05-16

## 2018-05-16 RX ORDER — ALBUTEROL SULFATE 90 UG/1
2 AEROSOL, METERED RESPIRATORY (INHALATION) 4 TIMES DAILY
Qty: 18 G | Refills: 0 | Status: SHIPPED | OUTPATIENT
Start: 2018-05-16 | End: 2018-09-29 | Stop reason: SDUPTHER

## 2018-05-16 RX ORDER — PREDNISONE 20 MG/1
40 TABLET ORAL DAILY
Qty: 8 TABLET | Refills: 0 | Status: SHIPPED | OUTPATIENT
Start: 2018-05-16 | End: 2018-05-20

## 2018-05-16 RX ADMIN — GUAIFENESIN AND DEXTROMETHORPHAN 10 ML: 100; 10 SYRUP ORAL at 11:22

## 2018-05-16 RX ADMIN — PREDNISONE 60 MG: 20 TABLET ORAL at 11:42

## 2018-05-16 NOTE — DISCHARGE INSTRUCTIONS
Acute Bronchitis   WHAT YOU SHOULD KNOW:   Acute bronchitis is swelling and irritation in the air passages of your lungs  This irritation may cause you to cough or have other breathing problems  Acute bronchitis often starts because of another viral illness, such as a cold or the flu  The illness spreads from your nose and throat to your windpipe and airways  Bronchitis is often called a chest cold  Acute bronchitis lasts about 2 weeks and is usually not a serious illness  AFTER YOU LEAVE:   Medicines:   · Ibuprofen or acetaminophen:  These medicines help lower a fever  They are available without a doctor's order  Ask your healthcare provider which medicine is right for you  Ask how much to take and how often to take it  Follow directions  These medicines can cause stomach bleeding if not taken correctly  Ibuprofen can cause kidney damage  Do not take ibuprofen if you have kidney disease, an ulcer, or allergies to aspirin  Acetaminophen can cause liver damage  Do not drink alcohol if you take acetaminophen  · Cough medicine: This medicine helps loosen mucus in your lungs and make it easier to cough up  This can help you breathe easier  · Inhalers: You may need one or more inhalers to help you breathe easier and cough less  An inhaler gives your medicine in a mist form so that you can breathe it into your lungs  Ask your healthcare provider to show you how to use your inhaler correctly  · Steroid medicine:  Steroid medicine helps open your air passages so you can breathe easier  · Take your medicine as directed  Call your healthcare provider if you think your medicine is not helping or if you have side effects  Tell him if you are allergic to any medicine  Keep a list of the medicines, vitamins, and herbs you take  Include the amounts, and when and why you take them  Bring the list or the pill bottles to follow-up visits  Carry your medicine list with you in case of an emergency    How to use an inhaler:   · Shake the inhaler well to make sure you get the correct amount of medicine per puff  Remove the cover from your inhaler's mouthpiece  If you are using a spacer, connect your inhaler to the flat end of the spacer  · Exhale as much air from your lungs as you can  Put the mouthpiece in your mouth past your front teeth and rest it on the top of your tongue  Do not block the mouthpiece opening with your tongue  · Breathe in through your mouth at a slow and steady rate  As you do this, press the inhaler to release the puff of medicine  Finish breathing in slowly and deeply as you inhale the medicine  When your lungs are full, hold your breath for 10 seconds  Then breathe out slowly through puckered lips or through your nose  · If you need to take more puffs, wait at least 1 minute between each puff  · Rinse your mouth with water after you use the inhaler  This may keep you from getting a mouth infection or irritation  · Follow the instructions that come with your inhaler to clean it  You should clean your inhaler at least once a week  Ways to care for yourself:   · Avoid alcohol:  Alcohol dulls your urge to cough and sneeze  When you have bronchitis, you need to be able to cough and sneeze to clear your air passages  Alcohol also causes your body to lose fluid  This can make the mucus in your lungs thicker and harder to cough up  · Avoid irritants in the air:  Do not smoke or allow others to smoke around you  Avoid chemicals, fumes, and dust  Wear a face mask if you must work around dust or fumes  Stay inside on days when air pollution levels are high  If you have allergies, stay inside when pollen counts are high  Avoid aerosol products  This includes spray-on deodorant, bug spray, and hair spray  · Drink more liquids:  Most people should drink at least 8 eight-ounce cups of water a day  You may need to drink more liquids when you have acute bronchitis   Liquids help keep your air passages moist and help you cough up mucus  · Get more rest:  You may feel like resting more  Slowly start to do more each day  Rest when you feel it is needed  · Eat healthy foods:  Eat a variety healthy foods every day  Your diet should include fruits, vegetables, breads, and protein (such as chicken, fish, and beans)  Dairy products (such as milk, cheese, and ice cream) can sometimes increase the amount of mucus your body makes  Ask if you should decrease your intake of dairy products  · Use a humidifier:  Use a cool mist humidifier to increase air moisture in your home  This may make it easier for you to breathe and help decrease your cough  Decrease your risk of acute bronchitis:   · Get the vaccinations you need:  Ask your healthcare provider if you should get vaccinated against the flu or pneumonia  · Avoid things that may irritate your lungs:  Stay inside or cover your mouth and nose with a scarf when you are outside during cold weather  You should also stay inside on days when air pollution levels are high  If you have allergies, stay inside when pollen counts are high  Avoid using aerosol products in your home  This includes spray-on deodorant, bug spray, and hair spray  · Avoid the spread of germs:        [facility] your hands often with soap and water  Carry germ-killing gel with you  You can use the gel to clean your hands when there is no soap and water available  ¨ Do not touch your eyes, nose, or mouth unless you have washed your hands first     ¨ Always cover your mouth when you cough  Cough into a tissue or your shirtsleeve so you do not spread germs from your hands  ¨ Try to avoid people who have a cold or the flu  If you are sick, stay away from others as much as possible  Follow up with your healthcare provider as directed:  Write down questions you have so you will remember to ask them during your follow-up visits    Contact your healthcare provider if:   · You have a fever     · Your skin becomes itchy or you have a rash after you take your medicine  · Your breathing problems do not go away or get worse  · Your cough does not get better with treatment  · You cough up blood  · You have questions or concerns about your condition or care  Seek care immediately or call 911 if:   · You faint  · Your lips or fingernails turn blue  · You feel like you are not getting enough air when you breathe  · You have swelling of your lips, tongue, or throat that makes it hard to breathe or swallow  © 2014 [employer] is for End User's use only and may not be sold, redistributed or otherwise used for commercial purposes  All illustrations and images included in CareNotes® are the copyrighted property of [employer]  or [employer]ark[employer]  The above information is an  only  It is not intended as medical advice for individual conditions or treatments  Talk to your doctor, nurse or pharmacist before following any medical regimen to see if it is safe and effective for you

## 2018-05-16 NOTE — ED PROVIDER NOTES
History  Chief Complaint   Patient presents with    Cough     productive - x 1 month     Sinus Problem     congestion x 1 month     41-year-old female, history of asthma, presenting today with a cough x4 weeks that is occasionally productive  Has run out of her inhaler  Was seen by her PCP and was told to drink tea for patient  Has been taking over-the-counter cough medication with minimal relief  Has also noted a sore throat likely due from cough per patient  Some nasal congestion  Denies fevers, shortness of breath, wheezing, abdominal pain, chest pain, nausea, vomiting, diarrhea  Prior to Admission Medications   Prescriptions Last Dose Informant Patient Reported? Taking? DUPIXENT 300 MG/2ML SOSY   Yes No   MedroxyPROGESTERone Acetate 150 MG/ML JIN   Yes No   albuterol (2 5 mg/3 mL) 0 083 % nebulizer solution   No No   Sig: Take 3 mL by nebulization every 6 (six) hours as needed for wheezing   albuterol (PROAIR HFA) 90 mcg/act inhaler Past Week at Unknown time  No Yes   Sig: Inhale 2 puffs 4 (four) times a day   halobetasol (ULTRAVATE) 0 05 % ointment   Yes No   naproxen (NAPROSYN) 500 mg tablet   No No   Sig: Take 0 5 tablets (250 mg total) by mouth 2 (two) times a day with meals      Facility-Administered Medications: None       Past Medical History:   Diagnosis Date    Asthma     Eczema        Past Surgical History:   Procedure Laterality Date     SECTION             Family History   Problem Relation Age of Onset    Eczema Mother     Eczema Maternal Grandfather     Asthma Maternal Grandfather      I have reviewed and agree with the history as documented  Social History   Substance Use Topics    Smoking status: Never Smoker    Smokeless tobacco: Never Used    Alcohol use No        Review of Systems   Constitutional: Negative  Negative for appetite change, chills and fever  HENT: Positive for congestion and sore throat   Negative for dental problem, ear pain, facial swelling, mouth sores, postnasal drip, sinus pressure and trouble swallowing  Eyes: Negative  Respiratory: Positive for cough  Negative for chest tightness, shortness of breath and wheezing  Cardiovascular: Negative  Negative for chest pain  Gastrointestinal: Negative  Negative for abdominal distention, abdominal pain, blood in stool, constipation, diarrhea, nausea and vomiting  Genitourinary: Negative  Musculoskeletal: Negative  Negative for arthralgias, neck pain and neck stiffness  Skin: Negative  Negative for rash  Neurological: Negative  Negative for facial asymmetry and headaches  All other systems reviewed and are negative  Physical Exam  ED Triage Vitals [05/16/18 1039]   Temperature Pulse Respirations Blood Pressure SpO2   98 9 °F (37 2 °C) 93 20 136/63 98 %      Temp Source Heart Rate Source Patient Position - Orthostatic VS BP Location FiO2 (%)   Oral Monitor Sitting Right arm --      Pain Score       7           Orthostatic Vital Signs  Vitals:    05/16/18 1039   BP: 136/63   Pulse: 93   Patient Position - Orthostatic VS: Sitting       Physical Exam   Constitutional: She appears well-developed and well-nourished  No distress  HENT:   Head: Normocephalic and atraumatic  Right Ear: External ear normal    Left Ear: External ear normal    Nose: Nose normal    Mouth/Throat: Oropharynx is clear and moist  No oropharyngeal exudate  Mild amount of erythema noted  No swelling, exudate, or uvula deviation noted of mouth  No discoloration, asymmetries or swelling of the face  No ulcerations, fluctuance, induration or drainage noted  No petechiae noted on palate  Able to swallow without difficulty  Full ROM of jaw  Eyes: Conjunctivae are normal  Pupils are equal, round, and reactive to light  Right eye exhibits no discharge  Left eye exhibits no discharge  No scleral icterus  Neck: Normal range of motion  Neck supple   No tracheal deviation present  Cardiovascular: Normal rate, regular rhythm, normal heart sounds and intact distal pulses  Exam reveals no friction rub  No murmur heard  Pulmonary/Chest: Effort normal and breath sounds normal  No stridor  No respiratory distress  She has no wheezes  She has no rales  She exhibits no tenderness  SpO2 is 98%, within normal limits, resting comfortably  No cyanosis or pallor  Slightly diminished breath sounds noted at the base of the lungs with decreased air flow  Abdominal: Soft  Bowel sounds are normal  She exhibits no distension  There is no tenderness  There is no rebound and no guarding  Lymphadenopathy:     She has no cervical adenopathy  Skin: Skin is warm and dry  No rash noted  She is not diaphoretic  No erythema  No pallor  No sandpaper rash noted  No other rashes noted  Good coloration of skin  Nursing note and vitals reviewed  ED Medications  Medications   predniSONE tablet 60 mg (not administered)   dextromethorphan-guaiFENesin (ROBITUSSIN DM)  mg/5 mL oral syrup 10 mL (10 mL Oral Given 5/16/18 1122)       Diagnostic Studies  Results Reviewed     None                 XR chest 2 views    (Results Pending)              Procedures  Procedures       Phone Contacts  ED Phone Contact    ED Course                               MDM  Number of Diagnoses or Management Options  Diagnosis management comments: Will treat for bronchitis  Patient is informed to return to the emergency department for worsening of symptoms and was given proper education regarding their diagnosis and symptoms  Otherwise the patient is informed to follow up with their primary care doctor for re-evaluation  The patient verbalizes understanding and agrees with above assessment and plan  All questions were answered  Please Note: Fluency Direct voice recognition software may have been used in the creation of this document   Wrong words or sound a like substitutions may have occurred due to the inherent limitations of the voice software  Amount and/or Complexity of Data Reviewed  Tests in the radiology section of CPT®: reviewed and ordered  Review and summarize past medical records: yes  Independent visualization of images, tracings, or specimens: yes      CritCare Time    Disposition  Final diagnoses:   Acute bronchitis   Bronchitis     Time reflects when diagnosis was documented in both MDM as applicable and the Disposition within this note     Time User Action Codes Description Comment    5/16/2018 11:35 AM Shermon Adryan Add [J20 9] Acute bronchitis     5/16/2018 11:35 AM Shermon Adryan Add [U84 978] Uncomplicated asthma, unspecified asthma severity, unspecified whether persistent     5/16/2018 11:36 AM Shermon Adryan Add [J40] Bronchitis       ED Disposition     ED Disposition Condition Comment    Discharge  Frutoso Sharma discharge to home/self care      Condition at discharge: Good        Follow-up Information     Follow up With Specialties Details Why Contact Info Additional [address] Emergency Department Emergency Medicine Go to If symptoms worsen [address] [phone] [facility], Mati Haywood [place], [address], DO Family Medicine, Internal Medicine Schedule an appointment as soon as possible for a visit As needed [address] [place] [address]  [phone]           Patient's Medications   Discharge Prescriptions    BENZONATATE (TESSALON PERLES) 100 MG CAPSULE    Take 1 capsule (100 mg total) by mouth 3 (three) times a day as needed for cough       Start Date: 5/16/2018 End Date: --       Order Dose: 100 mg       Quantity: 20 capsule    Refills: 0    PREDNISONE 20 MG TABLET    Take 2 tablets (40 mg total) by mouth daily for 4 days       Start Date: 5/16/2018 End Date: 5/20/2018       Order Dose: 40 mg       Quantity: 8 tablet    Refills: 0 No discharge procedures on file      ED Provider  Electronically Signed by           Elder Rod PA-C  05/16/18 1138

## 2018-05-17 ENCOUNTER — VBI (OUTPATIENT)
Dept: FAMILY MEDICINE CLINIC | Facility: CLINIC | Age: 23
End: 2018-05-17

## 2018-05-17 NOTE — TELEPHONE ENCOUNTER
Pt was seen in [address] on 5/16/18  CC: Cough; Sinus Problem  DX: Acute bronchitis; Bronchitis  Pt states that she is not in need of F/u appt at this time  Informed Pt of  on call, office hours, and phone number

## 2018-09-18 ENCOUNTER — HOSPITAL ENCOUNTER (EMERGENCY)
Facility: HOSPITAL | Age: 23
Discharge: HOME | End: 2018-09-18
Attending: EMERGENCY MEDICINE | Admitting: EMERGENCY MEDICINE
Payer: COMMERCIAL

## 2018-09-18 VITALS
RESPIRATION RATE: 18 BRPM | DIASTOLIC BLOOD PRESSURE: 72 MMHG | SYSTOLIC BLOOD PRESSURE: 135 MMHG | BODY MASS INDEX: 30.47 KG/M2 | OXYGEN SATURATION: 100 % | WEIGHT: 172 LBS | HEART RATE: 84 BPM | TEMPERATURE: 98.6 F

## 2018-09-18 DIAGNOSIS — L01.00 IMPETIGO: Primary | ICD-10-CM

## 2018-09-18 LAB
ANION GAP SERPL CALCULATED.3IONS-SCNC: 10 MMOL/L (ref 4–13)
BASOPHILS # BLD AUTO: 0.04 THOUSANDS/ΜL (ref 0–0.1)
BASOPHILS NFR BLD AUTO: 1 % (ref 0–1)
BUN SERPL-MCNC: 7 MG/DL (ref 5–25)
CALCIUM SERPL-MCNC: 8.9 MG/DL (ref 8.3–10.1)
CHLORIDE SERPL-SCNC: 104 MMOL/L (ref 100–108)
CO2 SERPL-SCNC: 25 MMOL/L (ref 21–32)
CREAT SERPL-MCNC: 0.64 MG/DL (ref 0.6–1.3)
EOSINOPHIL # BLD AUTO: 0.82 THOUSAND/ΜL (ref 0–0.61)
EOSINOPHIL NFR BLD AUTO: 13 % (ref 0–6)
ERYTHROCYTE [DISTWIDTH] IN BLOOD BY AUTOMATED COUNT: 13.1 % (ref 11.6–15.1)
GFR SERPL CREATININE-BSD FRML MDRD: 146 ML/MIN/1.73SQ M
GLUCOSE SERPL-MCNC: 71 MG/DL (ref 65–140)
HCT VFR BLD AUTO: 39.1 % (ref 34.8–46.1)
HGB BLD-MCNC: 12.2 G/DL (ref 11.5–15.4)
IMM GRANULOCYTES # BLD AUTO: 0.01 THOUSAND/UL (ref 0–0.2)
IMM GRANULOCYTES NFR BLD AUTO: 0 % (ref 0–2)
LYMPHOCYTES # BLD AUTO: 0.89 THOUSANDS/ΜL (ref 0.6–4.47)
LYMPHOCYTES NFR BLD AUTO: 14 % (ref 14–44)
MCH RBC QN AUTO: 26.3 PG (ref 26.8–34.3)
MCHC RBC AUTO-ENTMCNC: 31.2 G/DL (ref 31.4–37.4)
MCV RBC AUTO: 84 FL (ref 82–98)
MONOCYTES # BLD AUTO: 0.4 THOUSAND/ΜL (ref 0.17–1.22)
MONOCYTES NFR BLD AUTO: 6 % (ref 4–12)
NEUTROPHILS # BLD AUTO: 4.31 THOUSANDS/ΜL (ref 1.85–7.62)
NEUTS SEG NFR BLD AUTO: 66 % (ref 43–75)
NRBC BLD AUTO-RTO: 0 /100 WBCS
PLATELET # BLD AUTO: 262 THOUSANDS/UL (ref 149–390)
PMV BLD AUTO: 10.6 FL (ref 8.9–12.7)
POTASSIUM SERPL-SCNC: 4.4 MMOL/L (ref 3.5–5.3)
RBC # BLD AUTO: 4.64 MILLION/UL (ref 3.81–5.12)
SODIUM SERPL-SCNC: 139 MMOL/L (ref 136–145)
WBC # BLD AUTO: 6.47 THOUSAND/UL (ref 4.31–10.16)

## 2018-09-18 PROCEDURE — 99283 EMERGENCY DEPT VISIT LOW MDM: CPT

## 2018-09-18 PROCEDURE — 96361 HYDRATE IV INFUSION ADD-ON: CPT

## 2018-09-18 PROCEDURE — 36415 COLL VENOUS BLD VENIPUNCTURE: CPT | Performed by: PHYSICIAN ASSISTANT

## 2018-09-18 PROCEDURE — 85025 COMPLETE CBC W/AUTO DIFF WBC: CPT | Performed by: PHYSICIAN ASSISTANT

## 2018-09-18 PROCEDURE — 96365 THER/PROPH/DIAG IV INF INIT: CPT

## 2018-09-18 PROCEDURE — 80048 BASIC METABOLIC PNL TOTAL CA: CPT | Performed by: PHYSICIAN ASSISTANT

## 2018-09-18 RX ORDER — CEPHALEXIN 500 MG/1
500 CAPSULE ORAL 2 TIMES DAILY
Qty: 20 CAPSULE | Refills: 0 | Status: SHIPPED | OUTPATIENT
Start: 2018-09-18 | End: 2018-09-25 | Stop reason: HOSPADM

## 2018-09-18 RX ORDER — SULFAMETHOXAZOLE AND TRIMETHOPRIM 800; 160 MG/1; MG/1
1 TABLET ORAL ONCE
Status: COMPLETED | OUTPATIENT
Start: 2018-09-18 | End: 2018-09-18

## 2018-09-18 RX ORDER — SULFAMETHOXAZOLE AND TRIMETHOPRIM 800; 160 MG/1; MG/1
1 TABLET ORAL EVERY 12 HOURS SCHEDULED
Qty: 20 TABLET | Refills: 0 | Status: SHIPPED | OUTPATIENT
Start: 2018-09-18 | End: 2018-09-25 | Stop reason: HOSPADM

## 2018-09-18 RX ORDER — CEPHALEXIN 500 MG/1
500 CAPSULE ORAL ONCE
Status: COMPLETED | OUTPATIENT
Start: 2018-09-18 | End: 2018-09-18

## 2018-09-18 RX ADMIN — CEFAZOLIN SODIUM 2000 MG: 2 SOLUTION INTRAVENOUS at 11:53

## 2018-09-18 RX ADMIN — SULFAMETHOXAZOLE AND TRIMETHOPRIM 1 TABLET: 800; 160 TABLET ORAL at 13:19

## 2018-09-18 RX ADMIN — CEPHALEXIN 500 MG: 500 CAPSULE ORAL at 13:16

## 2018-09-18 RX ADMIN — SODIUM CHLORIDE 1000 ML: 0.9 INJECTION, SOLUTION INTRAVENOUS at 11:53

## 2018-09-18 RX ADMIN — MUPIROCIN: 20 OINTMENT TOPICAL at 13:21

## 2018-09-18 NOTE — ED PROVIDER NOTES
History  Chief Complaint   Patient presents with    Skin Problem     Pt started with blisters/rash to face 2 days ago, and c/o itching everywhere  Pt reports having this previously, but doesn't recall what its called, does remember prednisone made it worse  25 y/o female, h/o eczema/ vitiligo/ asthma, presenting today with a rash that is located on her face neck and chest over the past 2 days  Relays that she has had this rash before in the past   Relays that she started with small blisters on the right portion of her face has now spread now is using yellowish fluid and has some yellow crusting  Feels slightly swollen  She otherwise is feeling well without any other complaints  No sick contacts  Relays she was placed on antibiotics last time she had this  Denies nausea vomiting, difficulty swallowing foods or liquids, shortness of breath, wheezing, fevers, cough, congestion, rhinorrhea  Prior to Admission Medications   Prescriptions Last Dose Informant Patient Reported? Taking?    DUPIXENT 300 MG/2ML SOSY   Yes No   MedroxyPROGESTERone Acetate 150 MG/ML JIN   Yes No   albuterol (2 5 mg/3 mL) 0 083 % nebulizer solution   No No   Sig: Take 3 mL by nebulization every 6 (six) hours as needed for wheezing   albuterol (PROAIR HFA) 90 mcg/act inhaler   No No   Sig: Inhale 2 puffs 4 (four) times a day   benzonatate (TESSALON PERLES) 100 mg capsule   No No   Sig: Take 1 capsule (100 mg total) by mouth 3 (three) times a day as needed for cough   halobetasol (ULTRAVATE) 0 05 % ointment   Yes No   naproxen (NAPROSYN) 500 mg tablet   No No   Sig: Take 0 5 tablets (250 mg total) by mouth 2 (two) times a day with meals      Facility-Administered Medications: None       Past Medical History:   Diagnosis Date    Asthma     Eczema     Vitiligo        Past Surgical History:   Procedure Laterality Date     SECTION             Family History   Problem Relation Age of Onset    Eczema Mother     Eczema Maternal Grandfather     Asthma Maternal Grandfather      I have reviewed and agree with the history as documented  Social History   Substance Use Topics    Smoking status: Former Smoker    Smokeless tobacco: Never Used    Alcohol use Yes      Comment: occasional        Review of Systems   Constitutional: Negative  HENT: Negative  Eyes: Negative  Respiratory: Negative  Cardiovascular: Negative  Gastrointestinal: Negative  Genitourinary: Negative  Musculoskeletal: Negative  Skin: Positive for rash  Negative for color change, pallor and wound  Neurological: Negative  All other systems reviewed and are negative  Physical Exam  Physical Exam   Constitutional: She is oriented to person, place, and time  She appears well-developed and well-nourished  HENT:   Head: Normocephalic and atraumatic  Right Ear: External ear normal    Left Ear: External ear normal    Nose: Nose normal    Mouth/Throat: Oropharynx is clear and moist    Eyes: Conjunctivae and EOM are normal  Pupils are equal, round, and reactive to light  Neck: Normal range of motion  Neck supple  Cardiovascular: Normal rate, regular rhythm, normal heart sounds and intact distal pulses  Exam reveals no gallop and no friction rub  No murmur heard  Pulmonary/Chest: Effort normal and breath sounds normal  No respiratory distress  She has no wheezes  She has no rales  She exhibits no tenderness  sop2 is 100% indicating adequate oxygenation    Abdominal: Soft  Bowel sounds are normal  She exhibits no distension and no mass  There is no tenderness  There is no rebound and no guarding  No hernia  Neurological: She is alert and oriented to person, place, and time  Skin: Skin is warm and dry  Capillary refill takes less than 2 seconds  Rash noted  No erythema  No pallor  Nursing note and vitals reviewed        Vital Signs  ED Triage Vitals   Temperature Pulse Respirations Blood Pressure SpO2 09/18/18 1321 09/18/18 1026 09/18/18 1026 09/18/18 1026 09/18/18 1026   98 6 °F (37 °C) 104 18 142/82 100 %      Temp Source Heart Rate Source Patient Position - Orthostatic VS BP Location FiO2 (%)   09/18/18 1321 09/18/18 1026 09/18/18 1026 09/18/18 1026 --   Oral Monitor Sitting Right arm       Pain Score       09/18/18 1026       9           Vitals:    09/18/18 1026 09/18/18 1321   BP: 142/82 135/72   Pulse: 104 84   Patient Position - Orthostatic VS: Sitting Lying       Visual Acuity      ED Medications  Medications   sodium chloride 0 9 % bolus 1,000 mL (0 mL Intravenous Stopped 9/18/18 1316)   ceFAZolin (ANCEF) IVPB (premix) 2,000 mg (0 mg Intravenous Stopped 9/18/18 1223)   cephalexin (KEFLEX) capsule 500 mg (500 mg Oral Given 9/18/18 1316)   sulfamethoxazole-trimethoprim (BACTRIM DS) 800-160 mg per tablet 1 tablet (1 tablet Oral Given 9/18/18 1319)   mupirocin (BACTROBAN) 2 % ointment ( Topical Given 9/18/18 1321)       Diagnostic Studies  Results Reviewed     Procedure Component Value Units Date/Time    Basic metabolic panel [[id number]] Collected:  09/18/18 1145    Lab Status:  Final result Specimen:  Blood from Arm, Left Updated:  09/18/18 1231     Sodium 139 mmol/L      Potassium 4 4 mmol/L      Chloride 104 mmol/L      CO2 25 mmol/L      ANION GAP 10 mmol/L      BUN 7 mg/dL      Creatinine 0 64 mg/dL      Glucose 71 mg/dL      Calcium 8 9 mg/dL      eGFR 146 ml/min/1 73sq m     Narrative:         National Kidney Disease Education Program recommendations are as follows:  GFR calculation is accurate only with a steady state creatinine  Chronic Kidney disease less than 60 ml/min/1 73 sq  meters  Kidney failure less than 15 ml/min/1 73 sq  meters      CBC and differential [[id number]]  (Abnormal) Collected:  09/18/18 1145    Lab Status:  Final result Specimen:  Blood from Arm, Left Updated:  09/18/18 1206     WBC 6 47 Thousand/uL      RBC 4 64 Million/uL      Hemoglobin 12 2 g/dL      Hematocrit 39 1 % MCV 84 fL      MCH 26 3 (L) pg      MCHC 31 2 (L) g/dL      RDW 13 1 %      MPV 10 6 fL      Platelets 761 Thousands/uL      nRBC 0 /100 WBCs      Neutrophils Relative 66 %      Immat GRANS % 0 %      Lymphocytes Relative 14 %      Monocytes Relative 6 %      Eosinophils Relative 13 (H) %      Basophils Relative 1 %      Neutrophils Absolute 4 31 Thousands/µL      Immature Grans Absolute 0 01 Thousand/uL      Lymphocytes Absolute 0 89 Thousands/µL      Monocytes Absolute 0 40 Thousand/µL      Eosinophils Absolute 0 82 (H) Thousand/µL      Basophils Absolute 0 04 Thousands/µL                  No orders to display              Procedures  Procedures       Phone Contacts  ED Phone Contact    ED Course                               MDM  Number of Diagnoses or Management Options  Impetigo:   Diagnosis management comments: Patient has history of severe eczema  She no longer sees dermatology  Relays that this cleared up last time she was on antibiotics  Believe patient likely has impetigo/staph, will place on antibiotics and have her follow up with her primary care doctor for re-evaluation with strict return precautions for any worsening symptoms  Patient verbalizes understanding and agrees with the above assessment plan  Amount and/or Complexity of Data Reviewed  Clinical lab tests: reviewed and ordered  Review and summarize past medical records: yes  Discuss the patient with other providers: yes (Dr Joann De La Rosa )  Independent visualization of images, tracings, or specimens: yes      CritCare Time    Disposition  Final diagnoses:   Impetigo     Time reflects when diagnosis was documented in both MDM as applicable and the Disposition within this note     Time User Action Codes Description Comment    9/18/2018  1:16 PM Mynor Zheng [facility]       ED Disposition     ED Disposition Condition Comment    Discharge  Himanshu Joya discharge to home/self care      Condition at discharge: Good Follow-up Information     Follow up With Specialties Details Why Contact Info Additional [phone] Emergency Department Emergency Medicine Go to If symptoms worsen such as fevers, spreading redness, eye pain  [address] [facility] ED, [place], [place], [address], [address], Internal Medicine Schedule an appointment as soon as possible for a visit in 3 days  Cassandra Oliver  [phone]             Discharge Medication List as of 9/18/2018  1:18 PM      START taking these medications    Details   cephalexin (KEFLEX) 500 mg capsule Take 1 capsule (500 mg total) by mouth 2 (two) times a day for 10 days, Starting Tue 9/18/2018, Until Fri 9/28/2018, Print      sulfamethoxazole-trimethoprim (BACTRIM DS) 800-160 mg per tablet Take 1 tablet by mouth every 12 (twelve) hours for 10 days, Starting Tue 9/18/2018, Until Fri 9/28/2018, Print         CONTINUE these medications which have NOT CHANGED    Details   albuterol (2 5 mg/3 mL) 0 083 % nebulizer solution Take 3 mL by nebulization every 6 (six) hours as needed for wheezing, Starting Thu 10/5/2017, Print      albuterol (PROAIR HFA) 90 mcg/act inhaler Inhale 2 puffs 4 (four) times a day, Starting Wed 5/16/2018, Print      benzonatate (TESSALON PERLES) 100 mg capsule Take 1 capsule (100 mg total) by mouth 3 (three) times a day as needed for cough, Starting Wed 5/16/2018, Print      DUPIXENT 300 MG/2ML SOSY Starting Wed 2/28/2018, Historical Med      halobetasol (ULTRAVATE) 0 05 % ointment Starting Tue 1/30/2018, Historical Med      MedroxyPROGESTERone Acetate 150 MG/ML JIN Starting Mon 12/18/2017, Historical Med      naproxen (NAPROSYN) 500 mg tablet Take 0 5 tablets (250 mg total) by mouth 2 (two) times a day with meals, Starting Fri 3/30/2018, Normal           No discharge procedures on file      ED Provider  Electronically Signed by           Nas Calderon PA-C  09/18/18 8406

## 2018-09-18 NOTE — ED NOTES
Stressed to patient importance of getting scripts filled and follow up  dc'd with understanding scripts, and DC instructions     Maurice Jenkins RN  09/18/18 3803

## 2018-09-18 NOTE — DISCHARGE INSTRUCTIONS
Impetigo   WHAT YOU NEED TO KNOW:   Impetigo is a skin infection caused by bacteria  The infection can cause sores to form anywhere on your body  The sores develop watery or pus-filled blisters that break and form thick crusts  Impetigo is most common in children and spreads easily from person to person  DISCHARGE INSTRUCTIONS:   Return to the emergency department if:   · You have painful, red, warm skin around the blisters  · Your face is swollen  · You urinate less than usual or there is blood in your urine  Contact your healthcare provider if:   · You have a fever  · The sores become more red, swollen, warm, or tender  · The sores do not start to heal after 3 days of treatment  · You have questions or concerns about your condition or care  Medicines:   · Antibiotics  treat the bacterial infection  Antibiotics may be given as a pill or cream  Wash your skin and gently remove any crusts before you apply the antibiotic cream      · Take your medicine as directed  Contact your healthcare provider if you think your medicine is not helping or if you have side effects  Tell him or her if you are allergic to any medicine  Keep a list of the medicines, vitamins, and herbs you take  Include the amounts, and when and why you take them  Bring the list or the pill bottles to follow-up visits  Carry your medicine list with you in case of an emergency  Prevent the spread of impetigo:   · Avoid direct contact  You can spread impetigo if someone touches or uses something that touched your infected skin  You can also spread impetigo on your own body when you touch the area and then touch somewhere else  Keep the sores covered with gauze so you will not scratch or touch them  Keep your fingernails short  Your child may need to wear mittens so he does not scratch his sores  · Wash your hands often  Always wash your hands after you touch the infected area   Wash your hands before you touch food, your eyes, or other people  If no water is available, use an alcohol-based gel to clean your hands  · Wash household items  Do not share or reuse items that have come in contact with impetigo sores  Examples include bedding, towels, washcloths, and eating utensils  These items may be used again after they have been washed with hot water and soap  Clean your sores safely:  Wash your skin sores with antibacterial soap and water  You may need to do this 2 to 3 times each day until the sores heal  If the area is crusted, gently wash the sores with gauze or a clean washcloth to remove the crust  Pat the area dry with a clean towel  Wash your hands, the washcloth, and the towel after you clean the area around the sores  Return to work or school: You may return to work or school 48 hours after you start the antibiotic medicine  If your child has impetigo, tell his school or  center about the infection  Follow up with your healthcare provider as directed:  Write down your questions so you remember to ask them during your visits  © 2017 [facility] Information is for End User's use only and may not be sold, redistributed or otherwise used for commercial purposes  All illustrations and images included in CareNotes® are the copyrighted property of [employer]  or Macario Larkin  The above information is an  only  It is not intended as medical advice for individual conditions or treatments  Talk to your doctor, nurse or pharmacist before following any medical regimen to see if it is safe and effective for you

## 2018-09-19 ENCOUNTER — HOSPITAL ENCOUNTER (INPATIENT)
Facility: HOSPITAL | Age: 23
LOS: 6 days | Discharge: NURSING, REHAB OR LONG-TERM CARE | DRG: 603 | End: 2018-09-25
Attending: EMERGENCY MEDICINE | Admitting: FAMILY MEDICINE
Payer: COMMERCIAL

## 2018-09-19 DIAGNOSIS — L30.9 SEVERE ECZEMA: ICD-10-CM

## 2018-09-19 DIAGNOSIS — L03.211 FACIAL CELLULITIS: ICD-10-CM

## 2018-09-19 DIAGNOSIS — L30.9 ECZEMA, UNSPECIFIED TYPE: ICD-10-CM

## 2018-09-19 DIAGNOSIS — L01.00 IMPETIGO: Primary | ICD-10-CM

## 2018-09-19 PROBLEM — L80 VITILIGO: Status: ACTIVE | Noted: 2018-09-19

## 2018-09-19 LAB
ALBUMIN SERPL BCP-MCNC: 3.5 G/DL (ref 3.5–5)
ALP SERPL-CCNC: 68 U/L (ref 46–116)
ALT SERPL W P-5'-P-CCNC: 25 U/L (ref 12–78)
ANION GAP SERPL CALCULATED.3IONS-SCNC: 10 MMOL/L (ref 4–13)
APTT PPP: 29 SECONDS (ref 24–33)
AST SERPL W P-5'-P-CCNC: 34 U/L (ref 5–45)
BASOPHILS # BLD AUTO: 0.05 THOUSANDS/ΜL (ref 0–0.1)
BASOPHILS NFR BLD AUTO: 1 % (ref 0–1)
BILIRUB SERPL-MCNC: 0.8 MG/DL (ref 0.2–1)
BUN SERPL-MCNC: 6 MG/DL (ref 5–25)
CALCIUM SERPL-MCNC: 8.7 MG/DL (ref 8.3–10.1)
CHLORIDE SERPL-SCNC: 107 MMOL/L (ref 100–108)
CO2 SERPL-SCNC: 24 MMOL/L (ref 21–32)
CREAT SERPL-MCNC: 0.79 MG/DL (ref 0.6–1.3)
EOSINOPHIL # BLD AUTO: 0.91 THOUSAND/ΜL (ref 0–0.61)
EOSINOPHIL NFR BLD AUTO: 19 % (ref 0–6)
ERYTHROCYTE [DISTWIDTH] IN BLOOD BY AUTOMATED COUNT: 12.9 % (ref 11.6–15.1)
GFR SERPL CREATININE-BSD FRML MDRD: 123 ML/MIN/1.73SQ M
GLUCOSE SERPL-MCNC: 80 MG/DL (ref 65–140)
HCT VFR BLD AUTO: 39.7 % (ref 34.8–46.1)
HGB BLD-MCNC: 12.4 G/DL (ref 11.5–15.4)
IMM GRANULOCYTES # BLD AUTO: 0 THOUSAND/UL (ref 0–0.2)
IMM GRANULOCYTES NFR BLD AUTO: 0 % (ref 0–2)
INR PPP: 1.06 (ref 0.86–1.16)
LACTATE SERPL-SCNC: 1.2 MMOL/L (ref 0.5–2)
LYMPHOCYTES # BLD AUTO: 0.71 THOUSANDS/ΜL (ref 0.6–4.47)
LYMPHOCYTES NFR BLD AUTO: 15 % (ref 14–44)
MCH RBC QN AUTO: 26.3 PG (ref 26.8–34.3)
MCHC RBC AUTO-ENTMCNC: 31.2 G/DL (ref 31.4–37.4)
MCV RBC AUTO: 84 FL (ref 82–98)
MONOCYTES # BLD AUTO: 0.4 THOUSAND/ΜL (ref 0.17–1.22)
MONOCYTES NFR BLD AUTO: 8 % (ref 4–12)
NEUTROPHILS # BLD AUTO: 2.75 THOUSANDS/ΜL (ref 1.85–7.62)
NEUTS SEG NFR BLD AUTO: 57 % (ref 43–75)
NRBC BLD AUTO-RTO: 0 /100 WBCS
PLATELET # BLD AUTO: 277 THOUSANDS/UL (ref 149–390)
PMV BLD AUTO: 10.5 FL (ref 8.9–12.7)
POTASSIUM SERPL-SCNC: 3.9 MMOL/L (ref 3.5–5.3)
PROT SERPL-MCNC: 7.4 G/DL (ref 6.4–8.2)
PROTHROMBIN TIME: 11.1 SECONDS (ref 9.4–11.7)
RBC # BLD AUTO: 4.71 MILLION/UL (ref 3.81–5.12)
SODIUM SERPL-SCNC: 141 MMOL/L (ref 136–145)
WBC # BLD AUTO: 4.82 THOUSAND/UL (ref 4.31–10.16)

## 2018-09-19 PROCEDURE — 85025 COMPLETE CBC W/AUTO DIFF WBC: CPT | Performed by: EMERGENCY MEDICINE

## 2018-09-19 PROCEDURE — 87077 CULTURE AEROBIC IDENTIFY: CPT | Performed by: FAMILY MEDICINE

## 2018-09-19 PROCEDURE — 87040 BLOOD CULTURE FOR BACTERIA: CPT | Performed by: EMERGENCY MEDICINE

## 2018-09-19 PROCEDURE — 87205 SMEAR GRAM STAIN: CPT | Performed by: FAMILY MEDICINE

## 2018-09-19 PROCEDURE — 99284 EMERGENCY DEPT VISIT MOD MDM: CPT

## 2018-09-19 PROCEDURE — 87186 SC STD MICRODIL/AGAR DIL: CPT | Performed by: FAMILY MEDICINE

## 2018-09-19 PROCEDURE — 85610 PROTHROMBIN TIME: CPT | Performed by: EMERGENCY MEDICINE

## 2018-09-19 PROCEDURE — 83605 ASSAY OF LACTIC ACID: CPT | Performed by: EMERGENCY MEDICINE

## 2018-09-19 PROCEDURE — 96365 THER/PROPH/DIAG IV INF INIT: CPT

## 2018-09-19 PROCEDURE — 80053 COMPREHEN METABOLIC PANEL: CPT | Performed by: EMERGENCY MEDICINE

## 2018-09-19 PROCEDURE — 85730 THROMBOPLASTIN TIME PARTIAL: CPT | Performed by: EMERGENCY MEDICINE

## 2018-09-19 PROCEDURE — 87070 CULTURE OTHR SPECIMN AEROBIC: CPT | Performed by: FAMILY MEDICINE

## 2018-09-19 PROCEDURE — 87081 CULTURE SCREEN ONLY: CPT | Performed by: FAMILY MEDICINE

## 2018-09-19 PROCEDURE — 87147 CULTURE TYPE IMMUNOLOGIC: CPT | Performed by: FAMILY MEDICINE

## 2018-09-19 PROCEDURE — 94760 N-INVAS EAR/PLS OXIMETRY 1: CPT

## 2018-09-19 PROCEDURE — 36415 COLL VENOUS BLD VENIPUNCTURE: CPT | Performed by: EMERGENCY MEDICINE

## 2018-09-19 RX ORDER — ALBUTEROL SULFATE 90 UG/1
2 AEROSOL, METERED RESPIRATORY (INHALATION) 4 TIMES DAILY
Status: DISCONTINUED | OUTPATIENT
Start: 2018-09-19 | End: 2018-09-25 | Stop reason: HOSPADM

## 2018-09-19 RX ORDER — LANOLIN ALCOHOL/MO/W.PET/CERES
CREAM (GRAM) TOPICAL AS NEEDED
Status: DISCONTINUED | OUTPATIENT
Start: 2018-09-19 | End: 2018-09-25 | Stop reason: HOSPADM

## 2018-09-19 RX ORDER — ALBUTEROL SULFATE 2.5 MG/3ML
2.5 SOLUTION RESPIRATORY (INHALATION) EVERY 6 HOURS PRN
Status: DISCONTINUED | OUTPATIENT
Start: 2018-09-19 | End: 2018-09-21

## 2018-09-19 RX ORDER — HYDROXYZINE HYDROCHLORIDE 25 MG/1
25 TABLET, FILM COATED ORAL EVERY 6 HOURS PRN
Status: DISCONTINUED | OUTPATIENT
Start: 2018-09-19 | End: 2018-09-21

## 2018-09-19 RX ORDER — DIPHENHYDRAMINE HCL 25 MG
12.5 TABLET ORAL ONCE
Status: COMPLETED | OUTPATIENT
Start: 2018-09-19 | End: 2018-09-19

## 2018-09-19 RX ORDER — BENZONATATE 100 MG/1
100 CAPSULE ORAL 3 TIMES DAILY PRN
Status: DISCONTINUED | OUTPATIENT
Start: 2018-09-19 | End: 2018-09-25 | Stop reason: HOSPADM

## 2018-09-19 RX ORDER — NAPROXEN 250 MG/1
250 TABLET ORAL 2 TIMES DAILY WITH MEALS
Status: DISCONTINUED | OUTPATIENT
Start: 2018-09-19 | End: 2018-09-25 | Stop reason: HOSPADM

## 2018-09-19 RX ADMIN — Medication: at 22:56

## 2018-09-19 RX ADMIN — ALBUTEROL SULFATE 2 PUFF: 90 AEROSOL, METERED RESPIRATORY (INHALATION) at 18:46

## 2018-09-19 RX ADMIN — DIPHENHYDRAMINE HCL 12.5 MG: 25 TABLET ORAL at 22:14

## 2018-09-19 RX ADMIN — VANCOMYCIN HYDROCHLORIDE 1250 MG: 10 INJECTION, POWDER, LYOPHILIZED, FOR SOLUTION INTRAVENOUS at 21:16

## 2018-09-19 RX ADMIN — VANCOMYCIN HYDROCHLORIDE 1250 MG: 10 INJECTION, POWDER, LYOPHILIZED, FOR SOLUTION INTRAVENOUS at 09:17

## 2018-09-19 RX ADMIN — HYDROXYZINE HYDROCHLORIDE 25 MG: 25 TABLET, FILM COATED ORAL at 17:47

## 2018-09-19 RX ADMIN — NAPROXEN 250 MG: 250 TABLET ORAL at 17:48

## 2018-09-19 RX ADMIN — MUPIROCIN: 20 OINTMENT TOPICAL at 21:30

## 2018-09-19 RX ADMIN — SODIUM CHLORIDE 1000 ML: 0.9 INJECTION, SOLUTION INTRAVENOUS at 09:02

## 2018-09-19 NOTE — RESPIRATORY THERAPY NOTE
RT Protocol Note  Natasha Metz 26 y o  female MRN: [id number]  Unit/Bed#: [address] [place] [address] Encounter: [id number]    Assessment    Principal Problem:    Facial cellulitis  Active Problems:    Eczema    Asthma    Vitiligo      Home Pulmonary Medications:  Albuterol nebulizer as needed  Proair inhaler QID       Past Medical History:   Diagnosis Date    Asthma     Eczema     Vitiligo      Social History     Social History    Marital status: /Civil Union     Spouse name: N/A    Number of children: N/A    Years of education: N/A     Social History Main Topics    Smoking status: Former Smoker    Smokeless tobacco: Never Used    Alcohol use Yes      Comment: occasional    Drug use: No    Sexual activity: Not Asked     Other Topics Concern    None     Social History Narrative    None       Subjective         Objective    Physical Exam:        Vitals:  Blood pressure 118/55, pulse 92, temperature 99 6 °F (37 6 °C), temperature source Oral, resp  rate 18, height 5' 2" (1 575 m), weight 79 5 kg (175 lb 5 7 oz), SpO2 100 %, not currently breastfeeding                  Plan    Respiratory Plan: Home Bronchodilator Patient pathway (continue with as needed nebulizer treatments)

## 2018-09-19 NOTE — H&P
H&P Exam - Demetria White 26 y o  female MRN: [id number]    Unit/Bed#: [address] [place] [address]- Encounter: [id number]    Assessment/Plan:  26year old female with PMH eczema and vitiligo presents with worsening impetigo despite IV abx in ED yesterday will be admitted to [facility] for at least 2 MN under the service of Dr Fabiola Jay with anticipated disposition home  * Impetigo   Assessment & Plan    Seen in ED 9/18 and given 1 dose IV cefazolin 2 mg and discharged with Rx Bactrim and Keflex PO with topical bactroban 2% ointment but patient did not fill due to inability to afford  Patient admitted for IV abx  ED given Vancomycin 1 250 gm IV x1; Lactic Acid resulted 1 2; No leukocytosis  · Continue on Vancomycin  · F/u Vancomycin Trough  · F/u Wound culture  · Blood Culture x2  · Continue topical bactroban 2% ointment BID to face  · Atarax PRN for itching        Facial cellulitis   Assessment & Plan    · Treated as above with Vancomycin IV  · Monitor        Asthma   Assessment & Plan    Patient endorses has not needed to rescue inhaler  No maintenance reported  · Albuterol nebulizer q6h PRN as needed for wheezing  · Albuterol HFA 90 mcg take 2 puffs QID PRN        Eczema   Assessment & Plan    Had prior Rx with Halobetasol 0 05% not taking any more  · Stable will monitor        Vitiligo   Assessment & Plan    · Stable          Global On Regular Diet, heplock, only on SCDs for DVT prophylaxis/Ambulate patient    History of Present Illness    26year old female PMH eczema, vitiligo, and asthma presents with worsening skin infection of face  3 days ago patient developed an itch starting at corner of chin  She unavoidable scratched the area which become more raw and swelling ensued  Tried castor oil and benadryl at home for relief  Redness and crust started to spread in the following days and there was associated draining of clear yellowish fluid    Patient presented to ED on 9/18 yesterday, diagnosed with impetigo and received IV Ancef and discharged with oral Keflex, Bactrim and topical bactroban ointment  Patient reports not filling medication due to cost and returns today with worsening spread to rest of face, around ears and neck  Pain is associated now even with opening her eyes and mouth due to worsening swelling  Denies fevers  Denies rash or wounds of other areas  Patient denies recent hospitalizations  Does not work in health care  Patient endorses in  had same episode of skin condition and was treated with cream without abx resolved after 2 weeks  ED Course: 1L NS bolus, Vancomycin 1 25g IV x1    Review of Systems   Constitutional: Positive for chills  Negative for fever  HENT: Positive for facial swelling  Negative for hearing loss, mouth sores, rhinorrhea and sore throat  Eyes: Negative for visual disturbance  Respiratory: Negative for shortness of breath  Cardiovascular: Negative for chest pain  Gastrointestinal: Negative for abdominal pain, blood in stool, constipation, diarrhea, nausea and vomiting  Genitourinary: Negative for difficulty urinating and hematuria  Musculoskeletal: Negative for arthralgias and myalgias  Skin: Positive for rash and wound  Allergic/Immunologic: Positive for food allergies (peanuts, citris allergy itches)  Neurological: Positive for light-headedness  Negative for headaches  Psychiatric/Behavioral: Positive for sleep disturbance         Historical Information   Past Medical History:   Diagnosis Date    Asthma     Eczema     Vitiligo      Past Surgical History:   Procedure Laterality Date     SECTION           Social History   History   Alcohol Use    Yes     Comment: occasional     History   Drug Use No     History   Smoking Status    Former Smoker   Smokeless Tobacco    Never Used     Family History:   Family History   Problem Relation Age of Onset    Eczema Mother     Eczema Maternal Grandfather     Asthma Maternal Grandfather Meds/Allergies   PTA meds:   Prior to Admission Medications   Prescriptions Last Dose Informant Patient Reported? Taking?    DUPIXENT 300 MG/2ML SOSY More than a month at Unknown time  Yes No   MedroxyPROGESTERone Acetate 150 MG/ML JIN More than a month at Unknown time  Yes No   albuterol (2 5 mg/3 mL) 0 083 % nebulizer solution 9/18/2018 at Unknown time  No Yes   Sig: Take 3 mL by nebulization every 6 (six) hours as needed for wheezing   albuterol (PROAIR HFA) 90 mcg/act inhaler 9/18/2018 at Unknown time  No Yes   Sig: Inhale 2 puffs 4 (four) times a day   benzonatate (TESSALON PERLES) 100 mg capsule More than a month at Unknown time  No No   Sig: Take 1 capsule (100 mg total) by mouth 3 (three) times a day as needed for cough   cephalexin (KEFLEX) 500 mg capsule 9/18/2018 at Unknown time  No Yes   Sig: Take 1 capsule (500 mg total) by mouth 2 (two) times a day for 10 days   halobetasol (ULTRAVATE) 0 05 % ointment More than a month at Unknown time  Yes No   naproxen (NAPROSYN) 500 mg tablet More than a month at Unknown time  No No   Sig: Take 0 5 tablets (250 mg total) by mouth 2 (two) times a day with meals   sulfamethoxazole-trimethoprim (BACTRIM DS) 800-160 mg per tablet 9/18/2018 at Unknown time  No Yes   Sig: Take 1 tablet by mouth every 12 (twelve) hours for 10 days      Facility-Administered Medications: None     Allergies   Allergen Reactions    Peanuts [Peanut Oil] Itching       Objective   First Vitals:   Blood Pressure: 146/83 (09/19/18 0811)  Pulse: (!) 108 (09/19/18 0811)  Temperature: 99 °F (37 2 °C) (09/19/18 0811)  Temp Source: Tympanic (09/19/18 1007)  Respirations: 20 (09/19/18 0811)  Height: 5' 2" (157 5 cm) (09/19/18 1108)  Weight - Scale: 79 4 kg (175 lb) (09/19/18 0811)  SpO2: 98 % (09/19/18 0811)    Current Vitals:   Blood Pressure: 118/55 (09/19/18 1327)  Pulse: 92 (09/19/18 1327)  Temperature: 99 6 °F (37 6 °C) (09/19/18 1327)  Temp Source: Oral (09/19/18 1327)  Respirations: 18 (09/19/18 1327)  Height: 5' 2" (157 5 cm) (09/19/18 1108)  Weight - Scale: 79 5 kg (175 lb 5 7 oz) (09/19/18 1108)  SpO2: 100 % (09/19/18 1327)      Intake/Output Summary (Last 24 hours) at 09/19/18 1355  Last data filed at 09/19/18 1217   Gross per 24 hour   Intake             1240 ml   Output                0 ml   Net             1240 ml       Invasive Devices     Peripheral Intravenous Line            Peripheral IV 09/19/18 Left Antecubital less than 1 day                Physical Exam   Constitutional: She is oriented to person, place, and time  She appears well-developed and well-nourished  HENT:   Head: Normocephalic  Mouth/Throat: Oropharynx is clear and moist    Eyes: EOM are normal  Pupils are equal, round, and reactive to light  Neck: Normal range of motion  Neck supple  Cardiovascular: Normal rate and regular rhythm  Pulmonary/Chest: Effort normal and breath sounds normal    Abdominal: Soft  Bowel sounds are normal  She exhibits no distension  There is no tenderness  Neurological: She is alert and oriented to person, place, and time  No cranial nerve deficit  Skin: Skin is warm and dry  Rash (skin on face and neck with diffuse swelling and clear yellow crusting and serous fluid;) noted  Psychiatric: She has a normal mood and affect   Her behavior is normal        Lab Results:   Results for orders placed or performed during the hospital encounter of 09/19/18   CBC and differential   Result Value Ref Range    WBC 4 82 4 31 - 10 16 Thousand/uL    RBC 4 71 3 81 - 5 12 Million/uL    Hemoglobin 12 4 11 5 - 15 4 g/dL    Hematocrit 39 7 34 8 - 46 1 %    MCV 84 82 - 98 fL    MCH 26 3 (L) 26 8 - 34 3 pg    MCHC 31 2 (L) 31 4 - 37 4 g/dL    RDW 12 9 11 6 - 15 1 %    MPV 10 5 8 9 - 12 7 fL    Platelets 778 272 - 773 Thousands/uL    nRBC 0 /100 WBCs    Neutrophils Relative 57 43 - 75 %    Immat GRANS % 0 0 - 2 %    Lymphocytes Relative 15 14 - 44 %    Monocytes Relative 8 4 - 12 %    Eosinophils Relative 19 (H) 0 - 6 %    Basophils Relative 1 0 - 1 %    Neutrophils Absolute 2 75 1 85 - 7 62 Thousands/µL    Immature Grans Absolute 0 00 0 00 - 0 20 Thousand/uL    Lymphocytes Absolute 0 71 0 60 - 4 47 Thousands/µL    Monocytes Absolute 0 40 0 17 - 1 22 Thousand/µL    Eosinophils Absolute 0 91 (H) 0 00 - 0 61 Thousand/µL    Basophils Absolute 0 05 0 00 - 0 10 Thousands/µL   Comprehensive metabolic panel   Result Value Ref Range    Sodium 141 136 - 145 mmol/L    Potassium 3 9 3 5 - 5 3 mmol/L    Chloride 107 100 - 108 mmol/L    CO2 24 21 - 32 mmol/L    ANION GAP 10 4 - 13 mmol/L    BUN 6 5 - 25 mg/dL    Creatinine 0 79 0 60 - 1 30 mg/dL    Glucose 80 65 - 140 mg/dL    Calcium 8 7 8 3 - 10 1 mg/dL    AST 34 5 - 45 U/L    ALT 25 12 - 78 U/L    Alkaline Phosphatase 68 46 - 116 U/L    Total Protein 7 4 6 4 - 8 2 g/dL    Albumin 3 5 3 5 - 5 0 g/dL    Total Bilirubin 0 80 0 20 - 1 00 mg/dL    eGFR 123 ml/min/1 73sq m   Protime-INR   Result Value Ref Range    Protime 11 1 9 4 - 11 7 seconds    INR 1 06 0 86 - 1 16   APTT   Result Value Ref Range    PTT 29 24 - 33 seconds   Lactic acid, plasma   Result Value Ref Range    LACTIC ACID 1 2 0 5 - 2 0 mmol/L       Imaging:   No orders to display         Code Status: No Order  Advance Directive and Living Will:      Power of :    POLST:      Counseling / Coordination of Care:   Greater than 50% of total time was spent with the patient and / or family counseling and / or coordination of care  A description of the counseling / coordination of care: discussed assessment and plan with attending  Jeanie Ferris

## 2018-09-19 NOTE — ED PROVIDER NOTES
History  Chief Complaint   Patient presents with    Rash     rash for 3 days  was in the ER yesterday but its getting worse  states she has bacteria on her face and eczema all over her body  states she cant afford the antibiotics  just took what was given to her in the ER     Patient states she started with a painful rash on the side of her mouth 3 or 4 days ago  She did not seek medical attention until yesterday when she was seen in the emergency department for the same  Patient was treated with IV antibiotics and placed on double coverage due to history of MRSA  Patient states she did not fill the prescriptions because she cannot afford her antibiotics  She states the rash is gotten worse and more painful and now can hardly open her eyes or her mouth due to the pain and swelling  He has no fever  Prior to Admission Medications   Prescriptions Last Dose Informant Patient Reported? Taking?    DUPIXENT 300 MG/2ML SOSY   Yes No   MedroxyPROGESTERone Acetate 150 MG/ML JIN   Yes No   albuterol (2 5 mg/3 mL) 0 083 % nebulizer solution   No No   Sig: Take 3 mL by nebulization every 6 (six) hours as needed for wheezing   albuterol (PROAIR HFA) 90 mcg/act inhaler   No No   Sig: Inhale 2 puffs 4 (four) times a day   benzonatate (TESSALON PERLES) 100 mg capsule   No No   Sig: Take 1 capsule (100 mg total) by mouth 3 (three) times a day as needed for cough   cephalexin (KEFLEX) 500 mg capsule 9/18/2018 at Unknown time  No Yes   Sig: Take 1 capsule (500 mg total) by mouth 2 (two) times a day for 10 days   halobetasol (ULTRAVATE) 0 05 % ointment   Yes No   naproxen (NAPROSYN) 500 mg tablet   No No   Sig: Take 0 5 tablets (250 mg total) by mouth 2 (two) times a day with meals   sulfamethoxazole-trimethoprim (BACTRIM DS) 800-160 mg per tablet 9/18/2018 at Unknown time  No Yes   Sig: Take 1 tablet by mouth every 12 (twelve) hours for 10 days      Facility-Administered Medications: None       Past Medical History: Diagnosis Date    Asthma     Eczema     Vitiligo        Past Surgical History:   Procedure Laterality Date     SECTION             Family History   Problem Relation Age of Onset    Eczema Mother     Eczema Maternal Grandfather     Asthma Maternal Grandfather      I have reviewed and agree with the history as documented  Social History   Substance Use Topics    Smoking status: Former Smoker    Smokeless tobacco: Never Used    Alcohol use Yes      Comment: occasional        Review of Systems   Constitutional: Negative for chills and fever  HENT: Positive for facial swelling  Negative for postnasal drip, sore throat and trouble swallowing  Respiratory: Negative for shortness of breath  Cardiovascular: Negative for chest pain  Gastrointestinal: Negative for abdominal pain and vomiting  Genitourinary: Negative for dysuria  Musculoskeletal: Negative for arthralgias  Skin: Positive for rash  Neurological: Positive for weakness  Psychiatric/Behavioral: Positive for dysphoric mood  Negative for confusion  All other systems reviewed and are negative  Physical Exam  Physical Exam   Constitutional: She is oriented to person, place, and time  She appears well-developed and well-nourished  HENT:   Right Ear: External ear normal    Left Ear: External ear normal    Mouth/Throat: Oropharynx is clear and moist    Vesicular rash covering most of her face  There is erythema as well including the face and neck   Eyes: Conjunctivae are normal    Neck: Normal range of motion  Neck supple  Cardiovascular: Normal rate, regular rhythm and normal heart sounds  Pulmonary/Chest: Effort normal and breath sounds normal    Abdominal: Soft  Bowel sounds are normal  There is no tenderness  Musculoskeletal: Normal range of motion  She exhibits no edema  Neurological: She is alert and oriented to person, place, and time  Skin: Skin is warm and dry     Patient has diffuse vitiligo  Psychiatric: She has a normal mood and affect  Her behavior is normal    Nursing note and vitals reviewed  Vital Signs  ED Triage Vitals [09/19/18 0811]   Temperature Pulse Respirations Blood Pressure SpO2   99 °F (37 2 °C) (!) 108 20 146/83 98 %      Temp src Heart Rate Source Patient Position - Orthostatic VS BP Location FiO2 (%)   -- -- -- -- --      Pain Score       5           Vitals:    09/19/18 0811   BP: 146/83   Pulse: (!) 108       Visual Acuity      ED Medications  Medications   vancomycin (VANCOCIN) 1,250 mg in sodium chloride 0 9 % 250 mL IVPB (1,250 mg Intravenous New Bag 9/19/18 0917)   sodium chloride 0 9 % bolus 1,000 mL (1,000 mL Intravenous New Bag 9/19/18 0902)       Diagnostic Studies  Results Reviewed     Procedure Component Value Units Date/Time    Comprehensive metabolic panel [[id number]] Collected:  09/19/18 0901    Lab Status:  Final result Specimen:  Blood from Arm, Left Updated:  09/19/18 0945     Sodium 141 mmol/L      Potassium 3 9 mmol/L      Chloride 107 mmol/L      CO2 24 mmol/L      ANION GAP 10 mmol/L      BUN 6 mg/dL      Creatinine 0 79 mg/dL      Glucose 80 mg/dL      Calcium 8 7 mg/dL      AST 34 U/L      ALT 25 U/L      Alkaline Phosphatase 68 U/L      Total Protein 7 4 g/dL      Albumin 3 5 g/dL      Total Bilirubin 0 80 mg/dL      eGFR 123 ml/min/1 73sq m     Narrative:         National Kidney Disease Education Program recommendations are as follows:  GFR calculation is accurate only with a steady state creatinine  Chronic Kidney disease less than 60 ml/min/1 73 sq  meters  Kidney failure less than 15 ml/min/1 73 sq  meters  Lactic acid, plasma [[id number]]  (Normal) Collected:  09/19/18 0901    Lab Status:  Final result Specimen:  Blood from Arm, Left Updated:  09/19/18 0942     LACTIC ACID 1 2 mmol/L     Narrative:         Result may be elevated if tourniquet was used during collection      Protime-INR [[id number]]  (Normal) Collected:  09/19/18 0901    Lab Status:  Final result Specimen:  Blood from Arm, Left Updated:  09/19/18 0940     Protime 11 1 seconds      INR 1 06    APTT [[id number]]  (Normal) Collected:  09/19/18 0901    Lab Status:  Final result Specimen:  Blood from Arm, Left Updated:  09/19/18 0940     PTT 29 seconds     CBC and differential [[id number]]  (Abnormal) Collected:  09/19/18 0901    Lab Status:  Final result Specimen:  Blood from Arm, Left Updated:  09/19/18 0917     WBC 4 82 Thousand/uL      RBC 4 71 Million/uL      Hemoglobin 12 4 g/dL      Hematocrit 39 7 %      MCV 84 fL      MCH 26 3 (L) pg      MCHC 31 2 (L) g/dL      RDW 12 9 %      MPV 10 5 fL      Platelets 138 Thousands/uL      nRBC 0 /100 WBCs      Neutrophils Relative 57 %      Immat GRANS % 0 %      Lymphocytes Relative 15 %      Monocytes Relative 8 %      Eosinophils Relative 19 (H) %      Basophils Relative 1 %      Neutrophils Absolute 2 75 Thousands/µL      Immature Grans Absolute 0 00 Thousand/uL      Lymphocytes Absolute 0 71 Thousands/µL      Monocytes Absolute 0 40 Thousand/µL      Eosinophils Absolute 0 91 (H) Thousand/µL      Basophils Absolute 0 05 Thousands/µL     Blood culture #1 [[id number]] Collected:  09/19/18 0901    Lab Status: In process Specimen:  Blood from Arm, Left Updated:  09/19/18 0916    Blood culture #2 [[id number]] Collected:  09/19/18 0908    Lab Status: In process Specimen:  Blood from Hand, Right Updated:  09/19/18 0916                 No orders to display              Procedures  Procedures       Phone Contacts  ED Phone Contact    ED Course                               MDM  Number of Diagnoses or Management Options  Facial cellulitis:   Impetigo:   Diagnosis management comments: Patient's noncompliance has progressed her disease  The facial cellulitis spread is becoming worse    Place the patient on vancomycin admit to hospital    CritCare Time    Disposition  Final diagnoses:   Impetigo   Facial cellulitis     Time reflects when diagnosis was documented in both MDM as applicable and the Disposition within this note     Time User Action Codes Description Comment    9/19/2018 10:01 AM Shawna JAVIER Add [L01 00] Impetigo     9/19/2018 10:01 AM Candis Martinez [[id number]] Facial cellulitis       ED Disposition     ED Disposition Condition Comment    Admit  Case was discussed with Dr Kiko Atkins and the patient's admission status was agreed to be Admission Status: inpatient status to the service of Dr Kiko Atkins          Andrea End    None         Patient's Medications   Discharge Prescriptions    No medications on file     No discharge procedures on file      ED Provider  Electronically Signed by           Stacy Lechuga MD  09/19/18 0965

## 2018-09-19 NOTE — PLAN OF CARE
Problem: RESPIRATORY - ADULT  Goal: Achieves optimal ventilation and oxygenation  INTERVENTIONS:  - Assess for changes in respiratory status  - Assess for changes in mentation and behavior  - Position to facilitate oxygenation and minimize respiratory effort  - Oxygen administration by appropriate delivery method based on oxygen saturation (per order) or ABGs  - Encourage broncho-pulmonary hygiene including cough, deep breathe, Incentive Spirometry  - Assess and instruct to report SOB or any respiratory difficulty  - Respiratory Therapy support as indicated  - Continue with nebulizer treatments as ordered    Outcome: Progressing      Comments: Patient has history of asthma  Nebulizer treatment ordered for as needed use  Will update POC x 3 days 9/22/18  Chantal Le, RRT

## 2018-09-20 LAB
ANION GAP SERPL CALCULATED.3IONS-SCNC: 7 MMOL/L (ref 4–13)
BUN SERPL-MCNC: 4 MG/DL (ref 5–25)
CALCIUM SERPL-MCNC: 8.5 MG/DL (ref 8.3–10.1)
CHLORIDE SERPL-SCNC: 108 MMOL/L (ref 100–108)
CO2 SERPL-SCNC: 24 MMOL/L (ref 21–32)
CREAT SERPL-MCNC: 0.61 MG/DL (ref 0.6–1.3)
ERYTHROCYTE [DISTWIDTH] IN BLOOD BY AUTOMATED COUNT: 13.1 % (ref 11.6–15.1)
GFR SERPL CREATININE-BSD FRML MDRD: 149 ML/MIN/1.73SQ M
GLUCOSE SERPL-MCNC: 87 MG/DL (ref 65–140)
HCT VFR BLD AUTO: 38.4 % (ref 34.8–46.1)
HGB BLD-MCNC: 11.9 G/DL (ref 11.5–15.4)
MAGNESIUM SERPL-MCNC: 1.8 MG/DL (ref 1.6–2.6)
MCH RBC QN AUTO: 26.3 PG (ref 26.8–34.3)
MCHC RBC AUTO-ENTMCNC: 31 G/DL (ref 31.4–37.4)
MCV RBC AUTO: 85 FL (ref 82–98)
PHOSPHATE SERPL-MCNC: 3.7 MG/DL (ref 2.7–4.5)
PLATELET # BLD AUTO: 246 THOUSANDS/UL (ref 149–390)
PMV BLD AUTO: 10.3 FL (ref 8.9–12.7)
POTASSIUM SERPL-SCNC: 3.8 MMOL/L (ref 3.5–5.3)
RBC # BLD AUTO: 4.53 MILLION/UL (ref 3.81–5.12)
SODIUM SERPL-SCNC: 139 MMOL/L (ref 136–145)
VANCOMYCIN TROUGH SERPL-MCNC: 3.9 UG/ML (ref 10–20)
WBC # BLD AUTO: 4.1 THOUSAND/UL (ref 4.31–10.16)

## 2018-09-20 PROCEDURE — 83735 ASSAY OF MAGNESIUM: CPT | Performed by: FAMILY MEDICINE

## 2018-09-20 PROCEDURE — 80048 BASIC METABOLIC PNL TOTAL CA: CPT | Performed by: FAMILY MEDICINE

## 2018-09-20 PROCEDURE — 94760 N-INVAS EAR/PLS OXIMETRY 1: CPT

## 2018-09-20 PROCEDURE — 85027 COMPLETE CBC AUTOMATED: CPT | Performed by: FAMILY MEDICINE

## 2018-09-20 PROCEDURE — 80202 ASSAY OF VANCOMYCIN: CPT | Performed by: STUDENT IN AN ORGANIZED HEALTH CARE EDUCATION/TRAINING PROGRAM

## 2018-09-20 PROCEDURE — 84100 ASSAY OF PHOSPHORUS: CPT | Performed by: FAMILY MEDICINE

## 2018-09-20 RX ORDER — ACETAMINOPHEN 325 MG/1
650 TABLET ORAL EVERY 6 HOURS PRN
Status: DISCONTINUED | OUTPATIENT
Start: 2018-09-20 | End: 2018-09-24

## 2018-09-20 RX ADMIN — MUPIROCIN: 20 OINTMENT TOPICAL at 19:42

## 2018-09-20 RX ADMIN — TOPICAL ANESTHETIC 2 SPRAY: 200 SPRAY DENTAL; PERIODONTAL at 17:30

## 2018-09-20 RX ADMIN — Medication 400 MG: at 09:03

## 2018-09-20 RX ADMIN — MUPIROCIN: 20 OINTMENT TOPICAL at 09:02

## 2018-09-20 RX ADMIN — VANCOMYCIN HYDROCHLORIDE 1250 MG: 10 INJECTION, POWDER, LYOPHILIZED, FOR SOLUTION INTRAVENOUS at 09:02

## 2018-09-20 RX ADMIN — Medication 400 MG: at 17:28

## 2018-09-20 RX ADMIN — NAPROXEN 250 MG: 250 TABLET ORAL at 09:02

## 2018-09-20 RX ADMIN — ALBUTEROL SULFATE 2 PUFF: 90 AEROSOL, METERED RESPIRATORY (INHALATION) at 19:42

## 2018-09-20 RX ADMIN — ACETAMINOPHEN 650 MG: 325 TABLET, FILM COATED ORAL at 22:00

## 2018-09-20 RX ADMIN — ALBUTEROL SULFATE 2 PUFF: 90 AEROSOL, METERED RESPIRATORY (INHALATION) at 15:35

## 2018-09-20 RX ADMIN — ALBUTEROL SULFATE 2 PUFF: 90 AEROSOL, METERED RESPIRATORY (INHALATION) at 09:03

## 2018-09-20 RX ADMIN — NAPROXEN 250 MG: 250 TABLET ORAL at 15:34

## 2018-09-20 RX ADMIN — VANCOMYCIN HYDROCHLORIDE 1500 MG: 10 INJECTION, POWDER, LYOPHILIZED, FOR SOLUTION INTRAVENOUS at 23:09

## 2018-09-20 RX ADMIN — TRIAMCINOLONE ACETONIDE: 1 OINTMENT TOPICAL at 17:53

## 2018-09-20 NOTE — ASSESSMENT & PLAN NOTE
· Manage as above     · Monitor and continue to emphasize not scratching face  · Pain control Naprosyn

## 2018-09-20 NOTE — ASSESSMENT & PLAN NOTE
· Patient endorses has not needed to rescue inhaler    No maintenance reported  · Albuterol HFA 90 mcg take 2 puffs QID PRN

## 2018-09-20 NOTE — CASE MANAGEMENT
Initial Clinical Review    Admission: Date/Time/Statement: 9/19/18 @ 1002     Orders Placed This Encounter   Procedures    Inpatient Admission (expected length of stay for this patient is greater than two midnights)     Standing Status:   Standing     Number of Occurrences:   1     Order Specific Question:   Admitting Physician     Answer:   Isaiah Torres [[id number]]     Order Specific Question:   Level of Care     Answer:   Med Surg [[id number]]     Order Specific Question:   Estimated length of stay     Answer:   More than 2 Midnights     Order Specific Question:   Certification     Answer:   I certify that inpatient services are medically necessary for this patient for a duration of greater than two midnights  See H&P and MD Progress Notes for additional information about the patient's course of treatment  ED: Date/Time/Mode of Arrival:   ED Arrival Information     Expected Arrival Acuity Means of Arrival Escorted By Service Admission Type    - 9/19/2018 08:03 Urgent Walk-In Self General Medicine Urgent    Arrival Complaint    Rash          Chief Complaint:   Chief Complaint   Patient presents with    Rash     rash for 3 days  was in the ER yesterday but its getting worse  states she has bacteria on her face and eczema all over her body  states she cant afford the antibiotics  just took what was given to her in the ER       History of Illness: 26year old female PMH eczema, vitiligo, and asthma presents with worsening skin infection of face  3 days ago patient developed an itch starting at corner of chin  She unavoidable scratched the area which become more raw and swelling ensued  Tried castor oil and benadryl at home for relief  Redness and crust started to spread in the following days and there was associated draining of clear yellowish fluid  Patient presented to ED on 9/18 yesterday, diagnosed with impetigo and received IV Ancef and discharged with oral Keflex, Bactrim and topical bactroban ointment  Patient reports not filling medication due to cost and returns today with worsening spread to rest of face, around ears and neck  Pain is associated now even with opening her eyes and mouth due to worsening swelling  Denies fevers  Denies rash or wounds of other areas  Patient denies recent hospitalizations  Does not work in health care  Patient endorses in 2015 had same episode of skin condition and was treated with cream without abx resolved after 2 weeks  ED Course: 1L NS bolus, Vancomycin 1 25g IV x1   Skin: Skin is warm and dry  Rash (skin on face and neck with diffuse swelling and clear yellow crusting and serous fluid;)     ED Vital Signs:   ED Triage Vitals   Temperature Pulse Respirations Blood Pressure SpO2   09/19/18 0811 09/19/18 0811 09/19/18 0811 09/19/18 0811 09/19/18 0811   99 °F (37 2 °C) (!) 108 20 146/83 98 %      Temp Source Heart Rate Source Patient Position - Orthostatic VS BP Location FiO2 (%)   09/19/18 1007 09/19/18 1007 09/19/18 1007 09/19/18 1007 --   Tympanic Monitor Lying Right arm       Pain Score       09/19/18 0811       5        Wt Readings from Last 1 Encounters:   09/19/18 79 5 kg (175 lb 5 7 oz)     Abnormal Labs/Diagnostic Test Results: BUN 4 WBC 4 10     ED Treatment:   Medication Administration from 09/19/2018 0803 to 09/19/2018 1050       Date/Time Order Dose Route Action Action by Comments     09/19/2018 1002 sodium chloride 0 9 % bolus 1,000 mL 0 mL Intravenous Stopped Millicent Ng RN      09/19/2018 0902 sodium chloride 0 9 % bolus 1,000 mL 1,000 mL Intravenous [address] Millicent Ng RN      09/19/2018 0917 vancomycin (VANCOCIN) 1,250 mg in sodium chloride 0 9 % 250 mL IVPB 1,250 mg Intravenous New Bag Millicent Ng RN           Past Medical/Surgical History:    Active Ambulatory Problems     Diagnosis Date Noted    Eczema 01/02/2018    Asthma 93/19/5232    Folliculitis 01/97/7329    Bronchitis 05/15/2018    Pain in both feet 05/15/2018     Resolved Ambulatory Problems     Diagnosis Date Noted    No Resolved Ambulatory Problems     Past Medical History:   Diagnosis Date    Asthma     Eczema     Vitiligo        Admitting Diagnosis: Impetigo [L01 00]  Rash [R21]  Facial cellulitis [L03 211]    Age/Sex: 26 y o  female    Assessment/Plan:   Assessment/Plan:  26year old female with PMH eczema and vitiligo presents with worsening impetigo despite IV abx in ED yesterday will be admitted to [facility] for at least 2 MN under the service of Dr Josué Grubbs with anticipated disposition home  * Impetigo   Assessment & Plan     Seen in ED 9/18 and given 1 dose IV cefazolin 2 mg and discharged with Rx Bactrim and Keflex PO with topical bactroban 2% ointment   Patient admitted for IV abx  ED given Vancomycin 1 250 gm IV x1; Lactic Acid resulted 1 2; No leukocytosis  · Continue on Vancomycin  · F/u Vancomycin Trough  · F/u Wound culture  · Blood Culture x2  · Continue topical bactroban 2% ointment BID to face  · Atarax PRN for itching   Facial cellulitis   Assessment & Plan     · Treated as above with Vancomycin IV  · Monitor   Asthma   Assessment & Plan     Patient endorses has not needed to rescue inhaler    No maintenance reported  · Albuterol nebulizer q6h PRN as needed for wheezing  · Albuterol HFA 90 mcg take 2 puffs QID PRN   Eczema   Assessment & Plan     Had prior Rx with Halobetasol 0 05% not taking any more  · Stable will monitor   Vitiligo   Assessment & Plan     · Stable   Global On Regular Diet, heplock, only on SCDs for DVT prophylaxis/Ambulate patient       Admission Orders:  [facility]  RESPIRATORY PROTOCOL  CONSULT ID  VANCO TROUGH  Scheduled Meds:   Current Facility-Administered Medications:  albuterol 2 puff Inhalation 4x Daily [place]    albuterol 2 5 mg Nebulization Q6H PRN [place]    benzonatate 100 mg Oral TID PRN [place]    hydrOXYzine HCL 25 mg Oral Q6H PRN [place]    mupirocin  Topical BID [place]    naproxen 250 mg Oral BID With Meals [place] vancomycin 15 mg/kg Intravenous Q12H Irl Fan Last Rate: 1,250 mg (09/19/18 2116)   white petrolatum-mineral oil  Topical PRN Daniel Badillo MD      Continuous Infusions:    PRN Meds: albuterol    benzonatate    hydrOXYzine HCL    white petrolatum-mineral oil

## 2018-09-20 NOTE — CONSULTS
Consultation - Infectious Disease   Barb Sharma 26 y o  female MRN: [id number]  Unit/Bed#: [address] [place] [address] Encounter: [id number]        History of Present Illness   Physician Requesting Consult: Milka Chakraborty MD  Reason for Consult / Principal Problem:  Impetigo/facial cellulitis  HPI: Barb Sharma is a 26y o  year old female who has had longstanding history of the eczema, asthma, vitiligo, frequent and numerous visits with primary physicians as well as the ER, related to her eczema and asthma  Most recently, seen in the ER on September 18, for painful swelling of the face, of 2 days duration, and itching  She was noted to have tachycardia, small blisters across the face     oozing yellowish fluid  No swelling  Etc   She was diagnosed to have impetigo with underlying severe eczema, was given cefazolin, was prescribed cephalexin and Bactrim  She returned back to the [facility] Emergency room yesterday, complaining of worsening swelling and discomfort  She could not afford to buy the antibiotics  She was hospitalized  ID consult is requested for the treatment  She denies any fever or chills or night sweats  There is some malaise related to her problem  No loss of appetite  No sore throat, cough, phlegm, chest pain or abdominal pain  No dysuria, nausea, vomiting or diarrhea  She is unaware of any factors that exacerbate her asthma or eczema  She reports having eczema since the age of 2 year  She has not been able to follow up with any dermatologist   Reticent about why she has not sought close follow-up with a dermatologist   It is conceivable that she is unable to afford outpatient treatment  She is not sexually active and reports a negative HIV test just a couple of months ago  She denies any injection drug use or substance abuse  She reported having MRSA infection in the past     Allergies to medications are none        Inpatient consult to Infectious Diseases  Consult performed by: Pradeep Ferguson ordered by: Randy Butler          Review of Systems   Constitutional: Positive for activity change and fatigue  HENT: Negative  Eyes: Negative  Respiratory: Negative  Cardiovascular: Negative  Gastrointestinal: Negative  Endocrine: Negative  Genitourinary: Negative  Musculoskeletal: Negative  Skin: Positive for color change and rash  Allergic/Immunologic: Negative  Neurological: Negative  Hematological: Negative  Psychiatric/Behavioral: Negative          Historical Information   Past Medical History:   Diagnosis Date    Asthma     Eczema     Vitiligo      Past Surgical History:   Procedure Laterality Date     SECTION           Social History   History   Alcohol Use    Yes     Comment: occasional     History   Drug Use No     History   Smoking Status    Former Smoker   Smokeless Tobacco    Never Used     Family History:   Family History   Problem Relation Age of Onset    Eczema Mother     Eczema Maternal Grandfather     Asthma Maternal Grandfather        Meds/Allergies     Current Facility-Administered Medications:     albuterol (PROVENTIL HFA,VENTOLIN HFA) inhaler 2 puff, 2 puff, Inhalation, 4x Daily, Supriya Ranks, 2 puff at 18 0903    albuterol inhalation solution 2 5 mg, 2 5 mg, Nebulization, Q6H PRN, Supriya Ye    benzonatate (TESSALON PERLES) capsule 100 mg, 100 mg, Oral, TID PRN, Supriya Ye    hydrOXYzine HCL (ATARAX) tablet 25 mg, 25 mg, Oral, Q6H PRN, Supriya Ranks, 25 mg at 18 1747    magnesium oxide (MAG-OX) tablet 400 mg, 400 mg, Oral, BID, Supriya Ranks, 400 mg at 18 6781    mupirocin (BACTROBAN) 2 % ointment, , Topical, BID, Supriya Ye    naproxen (NAPROSYN) tablet 250 mg, 250 mg, Oral, BID With Meals, [place] Ranks, 250 mg at 18 0902    vancomycin (VANCOCIN) 1,250 mg in sodium chloride 0 9 % 250 mL IVPB, 15 mg/kg, Intravenous, Q12H, Supriya Ye, Last Rate: 166 7 mL/hr at 09/20/18 0902, 1,250 mg at 09/20/18 0902    white petrolatum-mineral oil (EUCERIN,HYDROCERIN) cream, , Topical, PRN, Cristino Wisdom MD  Allergies   Allergen Reactions    Peanuts [Peanut Oil] Itching     all current active meds have been reviewed    PTA meds:    Prescriptions Prior to Admission   Medication Sig Dispense Refill Last Dose    albuterol (2 5 mg/3 mL) 0 083 % nebulizer solution Take 3 mL by nebulization every 6 (six) hours as needed for wheezing 75 mL 0 9/18/2018 at Unknown time    albuterol (PROAIR HFA) 90 mcg/act inhaler Inhale 2 puffs 4 (four) times a day 18 g 0 9/18/2018 at Unknown time    cephalexin (KEFLEX) 500 mg capsule Take 1 capsule (500 mg total) by mouth 2 (two) times a day for 10 days 20 capsule 0 9/18/2018 at Unknown time    sulfamethoxazole-trimethoprim (BACTRIM DS) 800-160 mg per tablet Take 1 tablet by mouth every 12 (twelve) hours for 10 days 20 tablet 0 9/18/2018 at Unknown time    benzonatate (TESSALON PERLES) 100 mg capsule Take 1 capsule (100 mg total) by mouth 3 (three) times a day as needed for cough 20 capsule 0 More than a month at Unknown time    DUPIXENT 300 MG/2ML SOSY    More than a month at Unknown time    halobetasol (ULTRAVATE) 0 05 % ointment    More than a month at Unknown time    MedroxyPROGESTERone Acetate 150 MG/ML JIN    More than a month at Unknown time    naproxen (NAPROSYN) 500 mg tablet Take 0 5 tablets (250 mg total) by mouth 2 (two) times a day with meals 30 tablet 0 More than a month at Unknown time    and discontinued meds: There are no discontinued medications  PHYSICAL EXAM:  Vitals:    09/20/18 0413 09/20/18 0744 09/20/18 0845 09/20/18 0847   BP: 108/52 119/80     BP Location: Right arm Right arm     Pulse: 75 92 90    Resp: 18 18 18    Temp: 97 8 °F (36 6 °C) 98 6 °F (37 °C)     TempSrc: Oral Oral     SpO2: 99% 98% 98% 98%   Weight:       Height:         Body mass index is 32 07 kg/m²    Weight (last 2 days)     Date/Time Weight    09/19/18 1108  79 5 (175 35)    09/19/18 0811  79 4 (175)            Physical Exam  This is a young Afro-American woman who is alert  The vital signs are stable  HEENT exam:  Normocephalic head, no rash and on the scalp  There is scaly, confluent eruption on the entire face and behind the ears  There is diffuse swelling of the face, with tenderness  There was no increased heat  She was unable to open her mouth fully for optimal exam   Neck:  Is supple with no jugular venous distention, lymphadenopathy, thyromegaly or focal tenderness  Chest:  There is a tattoo on the chest wall  Heart sounds are regular with no murmurs  The lungs are clear to auscultation  Abdomen:  Is obese and soft and nontender  There is no organomegaly  Bowel sounds are active  Lower extremities: There is patchy lack of pigmentation over the distal legs  The calves are supple  There is no acute arthritis  Upper extremities:  Patchy loss of pigmentation over the forearms, hand  No acute phlebitis or arthritis  Skin:  The dermatitis is limited to her face and area behind her ears  The remainder of the skin is uninvolved  No intake or output data in the 24 hours ending 09/20/18 1249    Invasive Devices:   Peripheral IV 09/19/18 Left Antecubital (Active)   Site Assessment Clean;Dry; Intact 9/20/2018 11:00 AM   Dressing Type Transparent 9/20/2018 11:00 AM   Line Status Infusing 9/20/2018 11:00 AM   Dressing Status Clean;Dry; Intact 9/20/2018 11:00 AM   Dressing Change Due 09/23/18 9/19/2018 12:17 PM   Reason Not Rotated Not due 9/19/2018 12:17 PM         Lab Results:   Recent Results (from the past 96 hour(s))   CBC and differential    Collection Time: 09/18/18 11:45 AM   Result Value Ref Range    WBC 6 47 4 31 - 10 16 Thousand/uL    RBC 4 64 3 81 - 5 12 Million/uL    Hemoglobin 12 2 11 5 - 15 4 g/dL    Hematocrit 39 1 34 8 - 46 1 %    MCV 84 82 - 98 fL    MCH 26 3 (L) 26 8 - 34 3 pg MCHC 31 2 (L) 31 4 - 37 4 g/dL    RDW 13 1 11 6 - 15 1 %    MPV 10 6 8 9 - 12 7 fL    Platelets 001 257 - 991 Thousands/uL    nRBC 0 /100 WBCs    Neutrophils Relative 66 43 - 75 %    Immat GRANS % 0 0 - 2 %    Lymphocytes Relative 14 14 - 44 %    Monocytes Relative 6 4 - 12 %    Eosinophils Relative 13 (H) 0 - 6 %    Basophils Relative 1 0 - 1 %    Neutrophils Absolute 4 31 1 85 - 7 62 Thousands/µL    Immature Grans Absolute 0 01 0 00 - 0 20 Thousand/uL    Lymphocytes Absolute 0 89 0 60 - 4 47 Thousands/µL    Monocytes Absolute 0 40 0 17 - 1 22 Thousand/µL    Eosinophils Absolute 0 82 (H) 0 00 - 0 61 Thousand/µL    Basophils Absolute 0 04 0 00 - 0 10 Thousands/µL   Basic metabolic panel    Collection Time: 09/18/18 11:45 AM   Result Value Ref Range    Sodium 139 136 - 145 mmol/L    Potassium 4 4 3 5 - 5 3 mmol/L    Chloride 104 100 - 108 mmol/L    CO2 25 21 - 32 mmol/L    ANION GAP 10 4 - 13 mmol/L    BUN 7 5 - 25 mg/dL    Creatinine 0 64 0 60 - 1 30 mg/dL    Glucose 71 65 - 140 mg/dL    Calcium 8 9 8 3 - 10 1 mg/dL    eGFR 146 ml/min/1 73sq m   CBC and differential    Collection Time: 09/19/18  9:01 AM   Result Value Ref Range    WBC 4 82 4 31 - 10 16 Thousand/uL    RBC 4 71 3 81 - 5 12 Million/uL    Hemoglobin 12 4 11 5 - 15 4 g/dL    Hematocrit 39 7 34 8 - 46 1 %    MCV 84 82 - 98 fL    MCH 26 3 (L) 26 8 - 34 3 pg    MCHC 31 2 (L) 31 4 - 37 4 g/dL    RDW 12 9 11 6 - 15 1 %    MPV 10 5 8 9 - 12 7 fL    Platelets 593 440 - 998 Thousands/uL    nRBC 0 /100 WBCs    Neutrophils Relative 57 43 - 75 %    Immat GRANS % 0 0 - 2 %    Lymphocytes Relative 15 14 - 44 %    Monocytes Relative 8 4 - 12 %    Eosinophils Relative 19 (H) 0 - 6 %    Basophils Relative 1 0 - 1 %    Neutrophils Absolute 2 75 1 85 - 7 62 Thousands/µL    Immature Grans Absolute 0 00 0 00 - 0 20 Thousand/uL    Lymphocytes Absolute 0 71 0 60 - 4 47 Thousands/µL    Monocytes Absolute 0 40 0 17 - 1 22 Thousand/µL    Eosinophils Absolute 0 91 (H) 0 00 - 0 61 Thousand/µL    Basophils Absolute 0 05 0 00 - 0 10 Thousands/µL   Comprehensive metabolic panel    Collection Time: 09/19/18  9:01 AM   Result Value Ref Range    Sodium 141 136 - 145 mmol/L    Potassium 3 9 3 5 - 5 3 mmol/L    Chloride 107 100 - 108 mmol/L    CO2 24 21 - 32 mmol/L    ANION GAP 10 4 - 13 mmol/L    BUN 6 5 - 25 mg/dL    Creatinine 0 79 0 60 - 1 30 mg/dL    Glucose 80 65 - 140 mg/dL    Calcium 8 7 8 3 - 10 1 mg/dL    AST 34 5 - 45 U/L    ALT 25 12 - 78 U/L    Alkaline Phosphatase 68 46 - 116 U/L    Total Protein 7 4 6 4 - 8 2 g/dL    Albumin 3 5 3 5 - 5 0 g/dL    Total Bilirubin 0 80 0 20 - 1 00 mg/dL    eGFR 123 ml/min/1 73sq m   Protime-INR    Collection Time: 09/19/18  9:01 AM   Result Value Ref Range    Protime 11 1 9 4 - 11 7 seconds    INR 1 06 0 86 - 1 16   APTT    Collection Time: 09/19/18  9:01 AM   Result Value Ref Range    PTT 29 24 - 33 seconds   Blood culture #1    Collection Time: 09/19/18  9:01 AM   Result Value Ref Range    Blood Culture No Growth at 24 hrs  Lactic acid, plasma    Collection Time: 09/19/18  9:01 AM   Result Value Ref Range    LACTIC ACID 1 2 0 5 - 2 0 mmol/L   Blood culture #2    Collection Time: 09/19/18  9:08 AM   Result Value Ref Range    Blood Culture No Growth at 24 hrs      Basic metabolic panel    Collection Time: 09/20/18  6:30 AM   Result Value Ref Range    Sodium 139 136 - 145 mmol/L    Potassium 3 8 3 5 - 5 3 mmol/L    Chloride 108 100 - 108 mmol/L    CO2 24 21 - 32 mmol/L    ANION GAP 7 4 - 13 mmol/L    BUN 4 (L) 5 - 25 mg/dL    Creatinine 0 61 0 60 - 1 30 mg/dL    Glucose 87 65 - 140 mg/dL    Calcium 8 5 8 3 - 10 1 mg/dL    eGFR 149 ml/min/1 73sq m   Magnesium    Collection Time: 09/20/18  6:30 AM   Result Value Ref Range    Magnesium 1 8 1 6 - 2 6 mg/dL   Phosphorus    Collection Time: 09/20/18  6:30 AM   Result Value Ref Range    Phosphorus 3 7 2 7 - 4 5 mg/dL   CBC (With Platelets)    Collection Time: 09/20/18  6:30 AM   Result Value Ref Range    WBC 4 10 (L) 4 31 - 10 16 Thousand/uL    RBC 4 53 3 81 - 5 12 Million/uL    Hemoglobin 11 9 11 5 - 15 4 g/dL    Hematocrit 38 4 34 8 - 46 1 %    MCV 85 82 - 98 fL    MCH 26 3 (L) 26 8 - 34 3 pg    MCHC 31 0 (L) 31 4 - 37 4 g/dL    RDW 13 1 11 6 - 15 1 %    Platelets 939 244 - 960 Thousands/uL    MPV 10 3 8 9 - 12 7 fL     Cultures    Results from last 7 days  Lab Units 09/19/18  0908 09/19/18  0901   BLOOD CULTURE  No Growth at 24 hrs  No Growth at 24 hrs  HEPATITIS: No results found for: HAV, HEPAIGM, HEPBIGM, HEPBCAB, HBEAG, HEPCAB    PPD: No results found for: PPD    Urine Tox Screen: No results found for: PCP, THC, TCA    I have personally reviewed pertinent labs  Imaging Studies:  No results found  EKG, Pathology, and Other Studies: I have personally reviewed pertinent reports  Principal Problem:    Impetigo  Active Problems:    Eczema    Asthma    Facial cellulitis    Vitiligo      Assessment/Plan   This is a young woman with longstanding history of eczema, mostly involving her face, asthma and vitiligo, high consumer of healthcare, presented with worsening facial swelling and pain  Agree, most likely cellulitis of the face, due to an underlying chronic dermatitis  Accompanying eosinophilia, probably related to eczematous dermatitis  Likely microbial etiology is Staphylococcus aureus, possibly beta-hemolytic streptococci  Continue vancomycin pending culture results  May benefit from systemic/topical steroid treatment  Ideally, under the guidance of a dermatologist   Lack of systemic sepsis, makes it less likely to be beta-hemolytic streptococcal infection  Patient has limited financial resources, may need help to procure prescribed medications  Thank you for the consultation

## 2018-09-20 NOTE — PROGRESS NOTES
[address] [facility] [address] [facility] Practice Progress Note - Demetria White 26 y o  female MRN: [id number]    Unit/Bed#: [address] [place] [address] Encounter: [id number]      Assessment/Plan:  * Impetigo   Assessment & Plan    Seen in ED 9/18 and given 1 dose IV cefazolin 2 mg and discharged with Rx Bactrim and Keflex PO with topical bactroban 2% ointment but patient did not fill due to inability to afford  Patient admitted for IV abx  ED given Vancomycin 1 250 gm IV x1; Lactic Acid resulted 1 2; No leukocytosis  · Continue on Vancomycin 1 25g q12h IV  · MRSA pending  · F/u Vancomycin Trough 9/21 @ 0830  · F/u Wound culture pending  · Blood Culture x2 pending  · Continue topical bactroban 2% ointment BID to face  · Atarax PRN for itching  · Serous fluid from face is crusting and drying out        Facial cellulitis   Assessment & Plan    · Treated as above with Vancomycin IV  · Monitor        Asthma   Assessment & Plan    Patient endorses has not needed to rescue inhaler  No maintenance reported  · Albuterol nebulizer q6h PRN as needed for wheezing  · Albuterol HFA 90 mcg take 2 puffs QID PRN        Eczema   Assessment & Plan    Had prior Rx with Halobetasol 0 05% not taking any more  · Stable will monitor        Vitiligo   Assessment & Plan    · Stable            Subjective:   Patient seen and evaluated at bedside  Patient complains of persisting pruritis of face and still has face tightness from impetigo  Given Eucerin lotion  Afebrile overnight  No other overnight events  Objective:     Vitals: Blood pressure 119/80, pulse 92, temperature 98 6 °F (37 °C), temperature source Oral, resp  rate 18, height 5' 2" (1 575 m), weight 79 5 kg (175 lb 5 7 oz), SpO2 98 %, not currently breastfeeding  ,Body mass index is 32 07 kg/m²    Wt Readings from Last 3 Encounters:   09/19/18 79 5 kg (175 lb 5 7 oz)   09/18/18 78 kg (172 lb)   05/16/18 77 1 kg (170 lb)       Intake/Output Summary (Last 24 hours) at 09/20/18 0806  Last data filed at 09/19/18 1217   Gross per 24 hour   Intake             1240 ml   Output                0 ml   Net             1240 ml       Physical Exam: /80 (BP Location: Right arm)   Pulse 92   Temp 98 6 °F (37 °C) (Oral)   Resp 18   Ht 5' 2" (1 575 m)   Wt 79 5 kg (175 lb 5 7 oz)   SpO2 98%   Breastfeeding?  No   BMI 32 07 kg/m²   General appearance: alert and oriented, in no acute distress  Head: Normocephalic, without obvious abnormality, atraumatic  Lungs: clear to auscultation bilaterally  Heart: regular rate and rhythm  Abdomen: soft, non-tender; bowel sounds normal; no masses,  no organomegaly  Extremities: extremities normal, warm and well-perfused; no cyanosis, clubbing, or edema  Pulses: 2+ and symmetric  Skin:  pruritic impetigo of face with dry crusting and slight improvement of swelling      Recent Results (from the past 24 hour(s))   CBC and differential    Collection Time: 09/19/18  9:01 AM   Result Value Ref Range    WBC 4 82 4 31 - 10 16 Thousand/uL    RBC 4 71 3 81 - 5 12 Million/uL    Hemoglobin 12 4 11 5 - 15 4 g/dL    Hematocrit 39 7 34 8 - 46 1 %    MCV 84 82 - 98 fL    MCH 26 3 (L) 26 8 - 34 3 pg    MCHC 31 2 (L) 31 4 - 37 4 g/dL    RDW 12 9 11 6 - 15 1 %    MPV 10 5 8 9 - 12 7 fL    Platelets 976 357 - 179 Thousands/uL    nRBC 0 /100 WBCs    Neutrophils Relative 57 43 - 75 %    Immat GRANS % 0 0 - 2 %    Lymphocytes Relative 15 14 - 44 %    Monocytes Relative 8 4 - 12 %    Eosinophils Relative 19 (H) 0 - 6 %    Basophils Relative 1 0 - 1 %    Neutrophils Absolute 2 75 1 85 - 7 62 Thousands/µL    Immature Grans Absolute 0 00 0 00 - 0 20 Thousand/uL    Lymphocytes Absolute 0 71 0 60 - 4 47 Thousands/µL    Monocytes Absolute 0 40 0 17 - 1 22 Thousand/µL    Eosinophils Absolute 0 91 (H) 0 00 - 0 61 Thousand/µL    Basophils Absolute 0 05 0 00 - 0 10 Thousands/µL   Comprehensive metabolic panel    Collection Time: 09/19/18  9:01 AM   Result Value Ref Range    Sodium 141 136 - 145 mmol/L    Potassium 3 9 3 5 - 5 3 mmol/L    Chloride 107 100 - 108 mmol/L    CO2 24 21 - 32 mmol/L    ANION GAP 10 4 - 13 mmol/L    BUN 6 5 - 25 mg/dL    Creatinine 0 79 0 60 - 1 30 mg/dL    Glucose 80 65 - 140 mg/dL    Calcium 8 7 8 3 - 10 1 mg/dL    AST 34 5 - 45 U/L    ALT 25 12 - 78 U/L    Alkaline Phosphatase 68 46 - 116 U/L    Total Protein 7 4 6 4 - 8 2 g/dL    Albumin 3 5 3 5 - 5 0 g/dL    Total Bilirubin 0 80 0 20 - 1 00 mg/dL    eGFR 123 ml/min/1 73sq m   Protime-INR    Collection Time: 09/19/18  9:01 AM   Result Value Ref Range    Protime 11 1 9 4 - 11 7 seconds    INR 1 06 0 86 - 1 16   APTT    Collection Time: 09/19/18  9:01 AM   Result Value Ref Range    PTT 29 24 - 33 seconds   Lactic acid, plasma    Collection Time: 09/19/18  9:01 AM   Result Value Ref Range    LACTIC ACID 1 2 0 5 - 2 0 mmol/L   Basic metabolic panel    Collection Time: 09/20/18  6:30 AM   Result Value Ref Range    Sodium 139 136 - 145 mmol/L    Potassium 3 8 3 5 - 5 3 mmol/L    Chloride 108 100 - 108 mmol/L    CO2 24 21 - 32 mmol/L    ANION GAP 7 4 - 13 mmol/L    BUN 4 (L) 5 - 25 mg/dL    Creatinine 0 61 0 60 - 1 30 mg/dL    Glucose 87 65 - 140 mg/dL    Calcium 8 5 8 3 - 10 1 mg/dL    eGFR 149 ml/min/1 73sq m   Magnesium    Collection Time: 09/20/18  6:30 AM   Result Value Ref Range    Magnesium 1 8 1 6 - 2 6 mg/dL   Phosphorus    Collection Time: 09/20/18  6:30 AM   Result Value Ref Range    Phosphorus 3 7 2 7 - 4 5 mg/dL   CBC (With Platelets)    Collection Time: 09/20/18  6:30 AM   Result Value Ref Range    WBC 4 10 (L) 4 31 - 10 16 Thousand/uL    RBC 4 53 3 81 - 5 12 Million/uL    Hemoglobin 11 9 11 5 - 15 4 g/dL    Hematocrit 38 4 34 8 - 46 1 %    MCV 85 82 - 98 fL    MCH 26 3 (L) 26 8 - 34 3 pg    MCHC 31 0 (L) 31 4 - 37 4 g/dL    RDW 13 1 11 6 - 15 1 %    Platelets 571 003 - 691 Thousands/uL    MPV 10 3 8 9 - 12 7 fL       Current Facility-Administered Medications   Medication Dose Route Frequency Provider Last Rate Last Dose    albuterol (PROVENTIL HFA,VENTOLIN HFA) inhaler 2 puff  2 puff Inhalation 4x Daily Irl Fan   2 puff at 09/19/18 1846    albuterol inhalation solution 2 5 mg  2 5 mg Nebulization Q6H PRN Irl Fan        benzonatate (TESSALON PERLES) capsule 100 mg  100 mg Oral TID PRN Irl Fan        hydrOXYzine HCL (ATARAX) tablet 25 mg  25 mg Oral Q6H PRN Irl Fan   25 mg at 09/19/18 1747    mupirocin (BACTROBAN) 2 % ointment   Topical BID Irl Fan        naproxen (NAPROSYN) tablet 250 mg  250 mg Oral BID With Meals Irl Fan   250 mg at 09/19/18 1748    vancomycin (VANCOCIN) 1,250 mg in sodium chloride 0 9 % 250 mL IVPB  15 mg/kg Intravenous Q12H Lakhwinder Melissa 166 7 mL/hr at 09/19/18 2116 1,250 mg at 09/19/18 2116    white petrolatum-mineral oil (EUCERIN,HYDROCERIN) cream   Topical PRN Daniel Badillo MD           Invasive Devices     Peripheral Intravenous Line            Peripheral IV 09/19/18 Left Antecubital less than 1 day                Lab, Imaging and other studies: I have personally reviewed pertinent reports      VTE Pharmacologic Prophylaxis: Reason for no pharmacologic prophylaxis low risk  VTE Mechanical Prophylaxis: low risk, refused SCD, will ambulate    Irl Fan

## 2018-09-20 NOTE — ASSESSMENT & PLAN NOTE
· Pt was not compliant with home topical steroid regimen  Switched Triamcinalone to more potent topical Clobetasol cream which is to be put on face and neck BID  · white petrolatum-mineral oil (EUCERIN,HYDROCERIN) cream prn  · Per Dermatology consult, pt to avoid systemic PO steroids as this would have reflex worsening once steroids stopped  Manny Flores Spoke with dermatologist Dr Simona Mills over the phone again on 9/24/18 who stated that at this point, other than topical steroids and compliance with them, there is no further management  Pt has been tried on various treatments in the past by him which have not worked

## 2018-09-20 NOTE — CASE MANAGEMENT
Continued Stay Review    Date: 9/20/18    Vital Signs: /80 (BP Location: Right arm)   Pulse 92   Temp 98 6 °F (37 °C) (Oral)   Resp 18   Ht 5' 2" (1 575 m)   Wt 79 5 kg (175 lb 5 7 oz)   SpO2 98%   Breastfeeding? No   BMI 32 07 kg/m²     GMF  CONSULT ID  Medications:   Scheduled Meds:   Current Facility-Administered Medications:  albuterol 2 puff Inhalation 4x Daily Pleas Bobby    albuterol 2 5 mg Nebulization Q6H PRN Pleas Bobby    benzonatate 100 mg Oral TID PRN Pleas Bobby    hydrOXYzine HCL 25 mg Oral Q6H PRN Pleas Bobby    magnesium oxide 400 mg Oral BID Pleas Bobby    mupirocin  Topical BID Pleas Bobby    naproxen 250 mg Oral BID With Meals Pleas Bobby    vancomycin 15 mg/kg Intravenous Q12H Pleas Bobby Last Rate: 1,250 mg (09/19/18 2116)   white petrolatum-mineral oil  Topical PRN Tian Gross MD      Continuous Infusions:    PRN Meds: albuterol    benzonatate    hydrOXYzine HCL    white petrolatum-mineral oil    Abnormal Labs/Diagnostic Results: WC 4 10    Age/Sex: 26 y o  female     ID CONSULT  This is a young woman with longstanding history of eczema, mostly involving her face, asthma and vitiligo,  presented with worsening facial swelling and pain  Agree, most likely cellulitis of the face, due to an underlying chronic dermatitis  Accompanying eosinophilia, probably related to eczematous dermatitis  Likely microbial etiology is Staphylococcus aureus, possibly beta-hemolytic streptococci  Continue vancomycin pending culture results  May benefit from systemic/topical steroid treatment    Ideally, under the guidance of a dermatologist     ATTENDING  Patient complains of persisting pruritis of face and still has face tightness from impetigo  Impetigo  · Continue on Vancomycin 1 25g q12h IV  · MRSA pending  · F/u Vancomycin Trough 9/21 @ 0830  · F/u Wound culture pending  · Blood Culture x2 pending  · Continue topical bactroban 2% ointment BID to face  · Atarax PRN for itching  · Serous fluid from face is crusting and drying out   Asthma  · Albuterol nebulizer q6h PRN as needed for wheezing  · Albuterol HFA 90 mcg take 2 puffs QID PRN

## 2018-09-20 NOTE — SOCIAL WORK
Met with pt and her family  Pt has daughter at home that is a toddler  Uses no dme  NO hhc   Resides in single floor apt with no steps to enter  Currently is employed  Asked about medication expense, states she could not afford the copays  Will follow to see if medication assistance needed  Pt states she just got paid  Main contact is her mother Abhilash Ibrahim  Explained role of cm, no other d/c needs  CM reviewed d/c planning process including the following: identifying help at home, patient preference for d/c planning needs, and availability of the treatment team to discuss questions or concerns patient and/or family may have regarding understanding of medications and recognizing signs and symptoms once discharged  CM also encouraged patient to follow up with all recommended appointments after discharge  Patient advised of importance for patient and family to participate in managing patient's medical well being

## 2018-09-20 NOTE — ASSESSMENT & PLAN NOTE
· Pt with MRSA positive in the nares and wound cx growing 1+ Growth of Enterobacter cloacae complex, 1+ Growth of Pseudomonas putida group, 1+ Growth of Staphylococcus aureus  · Blood cultures negative x 2  · Continue IV Vancomycin and Cefepime, per ID, pt may be discharged on Ciprofloxacin and Bactrim based on susceptibilities  · IV Benadryl PRN for itching  · Continue Clobetasol cream BID on body and face with wrapping over it  · Patient was to be discharged 9/24, but she was extremely resistant, stating she could not move her arms enough to apply creams and dressings due to eczema in her underarms, and that there was not enough help at home for her to do so  Patient's grandmother called to speak with medical staff and requested placement in short term rehab as she believes patient would not be able to care for herself at home  Patient also complains of problems arranging transportation to and from medical appointments and pharmacies  · Appreciate continued assistance from social work/care management  · PT/OT consult ordered to evaluate for appropriateness for rehab

## 2018-09-21 PROBLEM — L20.89 OTHER ATOPIC DERMATITIS: Status: ACTIVE | Noted: 2018-09-21

## 2018-09-21 PROBLEM — L20.89 OTHER ATOPIC DERMATITIS: Status: RESOLVED | Noted: 2018-09-21 | Resolved: 2018-09-21

## 2018-09-21 LAB
ANION GAP SERPL CALCULATED.3IONS-SCNC: 10 MMOL/L (ref 4–13)
ANISOCYTOSIS BLD QL SMEAR: PRESENT
BASOPHILS # BLD MANUAL: 0 THOUSAND/UL (ref 0–0.1)
BASOPHILS NFR MAR MANUAL: 0 % (ref 0–1)
BUN SERPL-MCNC: 5 MG/DL (ref 5–25)
CALCIUM SERPL-MCNC: 8.5 MG/DL (ref 8.3–10.1)
CHLORIDE SERPL-SCNC: 105 MMOL/L (ref 100–108)
CO2 SERPL-SCNC: 22 MMOL/L (ref 21–32)
CREAT SERPL-MCNC: 0.66 MG/DL (ref 0.6–1.3)
EOSINOPHIL # BLD MANUAL: 0.71 THOUSAND/UL (ref 0–0.4)
EOSINOPHIL NFR BLD MANUAL: 9 % (ref 0–6)
ERYTHROCYTE [DISTWIDTH] IN BLOOD BY AUTOMATED COUNT: 13.2 % (ref 11.6–15.1)
ERYTHROCYTE [DISTWIDTH] IN BLOOD BY AUTOMATED COUNT: 13.2 % (ref 11.6–15.1)
GFR SERPL CREATININE-BSD FRML MDRD: 145 ML/MIN/1.73SQ M
GLUCOSE SERPL-MCNC: 90 MG/DL (ref 65–140)
HCT VFR BLD AUTO: 38.5 % (ref 34.8–46.1)
HCT VFR BLD AUTO: 39 % (ref 34.8–46.1)
HGB BLD-MCNC: 12.1 G/DL (ref 11.5–15.4)
HGB BLD-MCNC: 12.1 G/DL (ref 11.5–15.4)
LYMPHOCYTES # BLD AUTO: 0.86 THOUSAND/UL (ref 0.6–4.47)
LYMPHOCYTES # BLD AUTO: 11 % (ref 14–44)
MAGNESIUM SERPL-MCNC: 1.6 MG/DL (ref 1.6–2.6)
MCH RBC QN AUTO: 26.3 PG (ref 26.8–34.3)
MCH RBC QN AUTO: 26.4 PG (ref 26.8–34.3)
MCHC RBC AUTO-ENTMCNC: 31 G/DL (ref 31.4–37.4)
MCHC RBC AUTO-ENTMCNC: 31.4 G/DL (ref 31.4–37.4)
MCV RBC AUTO: 84 FL (ref 82–98)
MCV RBC AUTO: 85 FL (ref 82–98)
MONOCYTES # BLD AUTO: 0.31 THOUSAND/UL (ref 0–1.22)
MONOCYTES NFR BLD: 4 % (ref 4–12)
NEUTROPHILS # BLD MANUAL: 5.97 THOUSAND/UL (ref 1.85–7.62)
NEUTS BAND NFR BLD MANUAL: 14 % (ref 0–8)
NEUTS SEG NFR BLD AUTO: 62 % (ref 43–75)
NRBC BLD AUTO-RTO: 0 /100 WBCS
PHOSPHATE SERPL-MCNC: 4 MG/DL (ref 2.7–4.5)
PLATELET # BLD AUTO: 257 THOUSANDS/UL (ref 149–390)
PLATELET # BLD AUTO: 263 THOUSANDS/UL (ref 149–390)
PLATELET BLD QL SMEAR: ADEQUATE
PMV BLD AUTO: 11.2 FL (ref 8.9–12.7)
PMV BLD AUTO: 11.4 FL (ref 8.9–12.7)
POTASSIUM SERPL-SCNC: 3.8 MMOL/L (ref 3.5–5.3)
RBC # BLD AUTO: 4.58 MILLION/UL (ref 3.81–5.12)
RBC # BLD AUTO: 4.6 MILLION/UL (ref 3.81–5.12)
RBC MORPH BLD: NORMAL
SODIUM SERPL-SCNC: 137 MMOL/L (ref 136–145)
TOTAL CELLS COUNTED SPEC: 100
VANCOMYCIN TROUGH SERPL-MCNC: 20.9 UG/ML (ref 10–20)
WBC # BLD AUTO: 7.86 THOUSAND/UL (ref 4.31–10.16)
WBC # BLD AUTO: 7.88 THOUSAND/UL (ref 4.31–10.16)

## 2018-09-21 PROCEDURE — 85007 BL SMEAR W/DIFF WBC COUNT: CPT | Performed by: FAMILY MEDICINE

## 2018-09-21 PROCEDURE — 85027 COMPLETE CBC AUTOMATED: CPT | Performed by: STUDENT IN AN ORGANIZED HEALTH CARE EDUCATION/TRAINING PROGRAM

## 2018-09-21 PROCEDURE — 85027 COMPLETE CBC AUTOMATED: CPT | Performed by: FAMILY MEDICINE

## 2018-09-21 PROCEDURE — 80048 BASIC METABOLIC PNL TOTAL CA: CPT | Performed by: STUDENT IN AN ORGANIZED HEALTH CARE EDUCATION/TRAINING PROGRAM

## 2018-09-21 PROCEDURE — 80202 ASSAY OF VANCOMYCIN: CPT | Performed by: STUDENT IN AN ORGANIZED HEALTH CARE EDUCATION/TRAINING PROGRAM

## 2018-09-21 PROCEDURE — 99232 SBSQ HOSP IP/OBS MODERATE 35: CPT | Performed by: FAMILY MEDICINE

## 2018-09-21 PROCEDURE — 83735 ASSAY OF MAGNESIUM: CPT | Performed by: STUDENT IN AN ORGANIZED HEALTH CARE EDUCATION/TRAINING PROGRAM

## 2018-09-21 PROCEDURE — 84100 ASSAY OF PHOSPHORUS: CPT | Performed by: STUDENT IN AN ORGANIZED HEALTH CARE EDUCATION/TRAINING PROGRAM

## 2018-09-21 RX ORDER — DIPHENHYDRAMINE HCL 25 MG
25 TABLET ORAL EVERY 6 HOURS PRN
Status: DISCONTINUED | OUTPATIENT
Start: 2018-09-21 | End: 2018-09-23

## 2018-09-21 RX ORDER — MORPHINE SULFATE 2 MG/ML
2 INJECTION, SOLUTION INTRAMUSCULAR; INTRAVENOUS ONCE AS NEEDED
Status: COMPLETED | OUTPATIENT
Start: 2018-09-21 | End: 2018-09-23

## 2018-09-21 RX ORDER — DIPHENHYDRAMINE HCL 25 MG
25 TABLET ORAL ONCE
Status: COMPLETED | OUTPATIENT
Start: 2018-09-21 | End: 2018-09-21

## 2018-09-21 RX ORDER — MORPHINE SULFATE 2 MG/ML
2 INJECTION, SOLUTION INTRAMUSCULAR; INTRAVENOUS ONCE AS NEEDED
Status: COMPLETED | OUTPATIENT
Start: 2018-09-21 | End: 2018-09-21

## 2018-09-21 RX ORDER — DIPHENHYDRAMINE HYDROCHLORIDE 50 MG/ML
12.5 INJECTION INTRAMUSCULAR; INTRAVENOUS ONCE
Status: COMPLETED | OUTPATIENT
Start: 2018-09-21 | End: 2018-09-21

## 2018-09-21 RX ORDER — METHYLPREDNISOLONE SODIUM SUCCINATE 125 MG/2ML
60 INJECTION, POWDER, LYOPHILIZED, FOR SOLUTION INTRAMUSCULAR; INTRAVENOUS ONCE
Status: COMPLETED | OUTPATIENT
Start: 2018-09-21 | End: 2018-09-21

## 2018-09-21 RX ADMIN — DIPHENHYDRAMINE HYDROCHLORIDE 12.5 MG: 50 INJECTION, SOLUTION INTRAMUSCULAR; INTRAVENOUS at 05:11

## 2018-09-21 RX ADMIN — VANCOMYCIN HYDROCHLORIDE 1500 MG: 10 INJECTION, POWDER, LYOPHILIZED, FOR SOLUTION INTRAVENOUS at 06:30

## 2018-09-21 RX ADMIN — MORPHINE SULFATE 2 MG: 2 INJECTION, SOLUTION INTRAMUSCULAR; INTRAVENOUS at 11:25

## 2018-09-21 RX ADMIN — TRIAMCINOLONE ACETONIDE: 1 OINTMENT TOPICAL at 12:00

## 2018-09-21 RX ADMIN — DIPHENHYDRAMINE HCL 25 MG: 25 TABLET ORAL at 10:32

## 2018-09-21 RX ADMIN — TOPICAL ANESTHETIC 2 SPRAY: 200 SPRAY DENTAL; PERIODONTAL at 10:18

## 2018-09-21 RX ADMIN — NAPROXEN 250 MG: 250 TABLET ORAL at 17:40

## 2018-09-21 RX ADMIN — Medication: at 12:12

## 2018-09-21 RX ADMIN — ALBUTEROL SULFATE 2 PUFF: 90 AEROSOL, METERED RESPIRATORY (INHALATION) at 00:30

## 2018-09-21 RX ADMIN — VANCOMYCIN HYDROCHLORIDE 1500 MG: 10 INJECTION, POWDER, LYOPHILIZED, FOR SOLUTION INTRAVENOUS at 12:12

## 2018-09-21 RX ADMIN — METHYLPREDNISOLONE SODIUM SUCCINATE 60 MG: 125 INJECTION, POWDER, FOR SOLUTION INTRAMUSCULAR; INTRAVENOUS at 12:12

## 2018-09-21 RX ADMIN — VANCOMYCIN HYDROCHLORIDE 1500 MG: 10 INJECTION, POWDER, LYOPHILIZED, FOR SOLUTION INTRAVENOUS at 18:11

## 2018-09-21 RX ADMIN — DIPHENHYDRAMINE HCL 25 MG: 25 TABLET ORAL at 17:40

## 2018-09-21 RX ADMIN — TRIAMCINOLONE ACETONIDE: 1 OINTMENT TOPICAL at 22:48

## 2018-09-21 RX ADMIN — NAPROXEN 250 MG: 250 TABLET ORAL at 10:14

## 2018-09-21 RX ADMIN — ACETAMINOPHEN 650 MG: 325 TABLET, FILM COATED ORAL at 10:32

## 2018-09-21 RX ADMIN — HYDROXYZINE HYDROCHLORIDE 25 MG: 25 TABLET, FILM COATED ORAL at 00:22

## 2018-09-21 RX ADMIN — DIPHENHYDRAMINE HCL 25 MG: 25 TABLET ORAL at 02:15

## 2018-09-21 NOTE — CASE MANAGEMENT
Continued Stay Review    Date: 9/21/18    Vital Signs: /58 (BP Location: Right arm)   Pulse 98   Temp 99 °F (37 2 °C) (Oral)   Resp 18   Ht 5' 2" (1 575 m)   Wt 79 5 kg (175 lb 5 7 oz)   SpO2 98%   Breastfeeding? No   BMI 32 07 kg/m²     Iv benadryl 12 5  BENADRYL 25MG PO  Cbc diff   Iv morphine   IV SOLUMEDROL 60MG   Medications:   Scheduled Meds:   Current Facility-Administered Medications:  acetaminophen 650 mg Oral Q6H PRN Elidia Bush MD    albuterol 2 puff Inhalation 4x Daily Ro Hummingbird    benzonatate 100 mg Oral TID PRN Ro Hummingbird    diphenhydrAMINE 25 mg Oral Q6H PRN Ro Hummingbird    naproxen 250 mg Oral BID With Meals Ro Humjazmínd    triamcinolone  Topical BID Diefrancie Valentino,     vancomycin 1,500 mg Intravenous Q6H [facility] Elidia Bush MD Last Rate: 1,500 mg (09/21/18 1212)   white petrolatum-mineral oil  Topical PRN Elidia Bush MD      Continuous Infusions:    PRN Meds:   acetaminophen    benzonatate    diphenhydrAMINE    white petrolatum-mineral oil    Abnormal Labs/Diagnostic Results:   WOUND CULTURE   1+ Growth of Gram Negative Elliott*  1+ Growth of Gram Negative Elliott*  1+ Growth of Staphylococcus aureus*       Age/Sex: 26 y o  female     PER ID  Discussed with her nurse who reported that last night, the patient had severe pruritus, scratched her face so hard that it was bleeding  Almost all of the face is now covered with a dressing  Agree probable facial cellulitis, most likely due to Staphylococcus aureus, less likely polymicrobial with Staphylococcus aureus and gram-negative bacilli  Gives remote history of MRSA infection  Therefore, is being treated with vancomycin  Topical and systemic steroid treatment has been started to address the primary problem  Will monitor course    Dermatologist consulted and evaluated patient recommend  treatment for atopic/eczematous dermatitis with topical triamcinolone cream

## 2018-09-21 NOTE — PROGRESS NOTES
Progress Note - Infectious Disease   Maikel Hester 26 y o  female MRN: [id number]  Unit/Bed#: [address] [place] [address] Encounter: [id number]      Subjective/Objective   Subjective: The events overnight noted  Discussed with her nurse who reported that last night, the patient had severe pruritus, scratched her face so hard that it was bleeding  Almost all of the face is now covered with a dressing  According to Dr Sonny Andrea, senior resident, a consultation with Dr Rajeev Delgado, the dermatologist was obtained yesterday  It he had evaluated the patient, and has suggested treatment for atopic/eczematous dermatitis with topical triamcinolone cream   His consultation cannot be seen in [facility] as this physician is unable to navigate epic notes  His instructions have been followed, by the primary physician  In addition, a dose of Solu-Medrol, 60 mg, was administered about an hour ago  Patient reports some improvement in the itching  No other new symptoms    Meds/Allergies     Current Facility-Administered Medications:     acetaminophen (TYLENOL) tablet 650 mg, 650 mg, Oral, Q6H PRN, Erinn Mathur MD, 650 mg at 09/21/18 1032    albuterol (PROVENTIL HFA,VENTOLIN HFA) inhaler 2 puff, 2 puff, Inhalation, 4x Daily, Winda Rumple, 2 puff at 09/21/18 0030    benzonatate (TESSALON PERLES) capsule 100 mg, 100 mg, Oral, TID PRN, Winda Rumple    diphenhydrAMINE (BENADRYL) tablet 25 mg, 25 mg, Oral, Q6H PRN, Winda Rumple, 25 mg at 09/21/18 1032    naproxen (NAPROSYN) tablet 250 mg, 250 mg, Oral, BID With Meals, Winda Rumple, 250 mg at 09/21/18 1014    triamcinolone (KENALOG) 0 1 % ointment, , Topical, BID, Vincenzo Valentino DO    vancomycin (VANCOCIN) 1,500 mg in sodium chloride 0 9 % 250 mL IVPB, 1,500 mg, Intravenous, Q6H [address], Erinn Mathur MD, Last Rate: 166 7 mL/hr at 09/21/18 1212, 1,500 mg at 09/21/18 1212    white petrolatum-mineral oil (EUCERIN,HYDROCERIN) cream, , Topical, PRN, Erinn Mathur MD  Allergies   Allergen Reactions    Peanuts [Peanut Oil] Itching     all current active meds have been reviewed    discontinued meds:   Medications Discontinued During This Encounter   Medication Reason    vancomycin (VANCOCIN) 1,250 mg in sodium chloride 0 9 % 250 mL IVPB     vancomycin (VANCOCIN) 1,250 mg in sodium chloride 0 9 % 250 mL IVPB     albuterol inhalation solution 2 5 mg     hydrOXYzine HCL (ATARAX) tablet 25 mg     benzocaine (HURRICAINE) 20 % mucosal spray 2 spray     mupirocin (BACTROBAN) 2 % ointment        Physical Exam: Physical Exam    HR:  [101-107] 102  Resp:  [16-18] 18  BP: (125-133)/(60-62) 125/62  SpO2:  [98 %-99 %] 99 %  Body mass index is 32 07 kg/m²  Weight (last 2 days)     Date/Time   Weight    18 1108  79 5 (175 35)    18 0811  79 4 (175)            Temp (24hrs), Av 8 °F (37 7 °C), Min:99 1 °F (37 3 °C), Max:100 4 °F (38 °C)  Current: Temperature: 100 4 °F (38 °C)AGE@  No intake or output data in the 24 hours ending 18 1323  This is a young woman who appears comfortable  She has had a low-grade fever, now up to 100 4 degree F  There is accompanying tachycardia  The facial skin could not be optimally evaluated as it is covered with dressing which was just changed  The swelling and tenderness appears a little less to me  The neck is supple  The lungs are clear  Heart sounds are regular  Abdomen is obese and soft and nontender  The calves are supple        Invasive Devices     Peripheral Intravenous Line            Peripheral IV 18 Left Antecubital less than 1 day                            Lab, Imaging and other studies:    Recent Results (from the past 96 hour(s))   CBC and differential    Collection Time: 18 11:45 AM   Result Value Ref Range    WBC 6 47 4 31 - 10 16 Thousand/uL    RBC 4 64 3 81 - 5 12 Million/uL    Hemoglobin 12 2 11 5 - 15 4 g/dL    Hematocrit 39 1 34 8 - 46 1 %    MCV 84 82 - 98 fL    MCH 26 3 (L) 26 8 - 34 3 pg    MCHC 31 2 (L) 31 4 - 37 4 g/dL    RDW 13 1 11 6 - 15 1 %    MPV 10 6 8 9 - 12 7 fL    Platelets 475 489 - 576 Thousands/uL    nRBC 0 /100 WBCs    Neutrophils Relative 66 43 - 75 %    Immat GRANS % 0 0 - 2 %    Lymphocytes Relative 14 14 - 44 %    Monocytes Relative 6 4 - 12 %    Eosinophils Relative 13 (H) 0 - 6 %    Basophils Relative 1 0 - 1 %    Neutrophils Absolute 4 31 1 85 - 7 62 Thousands/µL    Immature Grans Absolute 0 01 0 00 - 0 20 Thousand/uL    Lymphocytes Absolute 0 89 0 60 - 4 47 Thousands/µL    Monocytes Absolute 0 40 0 17 - 1 22 Thousand/µL    Eosinophils Absolute 0 82 (H) 0 00 - 0 61 Thousand/µL    Basophils Absolute 0 04 0 00 - 0 10 Thousands/µL   Basic metabolic panel    Collection Time: 09/18/18 11:45 AM   Result Value Ref Range    Sodium 139 136 - 145 mmol/L    Potassium 4 4 3 5 - 5 3 mmol/L    Chloride 104 100 - 108 mmol/L    CO2 25 21 - 32 mmol/L    ANION GAP 10 4 - 13 mmol/L    BUN 7 5 - 25 mg/dL    Creatinine 0 64 0 60 - 1 30 mg/dL    Glucose 71 65 - 140 mg/dL    Calcium 8 9 8 3 - 10 1 mg/dL    eGFR 146 ml/min/1 73sq m   CBC and differential    Collection Time: 09/19/18  9:01 AM   Result Value Ref Range    WBC 4 82 4 31 - 10 16 Thousand/uL    RBC 4 71 3 81 - 5 12 Million/uL    Hemoglobin 12 4 11 5 - 15 4 g/dL    Hematocrit 39 7 34 8 - 46 1 %    MCV 84 82 - 98 fL    MCH 26 3 (L) 26 8 - 34 3 pg    MCHC 31 2 (L) 31 4 - 37 4 g/dL    RDW 12 9 11 6 - 15 1 %    MPV 10 5 8 9 - 12 7 fL    Platelets 315 805 - 365 Thousands/uL    nRBC 0 /100 WBCs    Neutrophils Relative 57 43 - 75 %    Immat GRANS % 0 0 - 2 %    Lymphocytes Relative 15 14 - 44 %    Monocytes Relative 8 4 - 12 %    Eosinophils Relative 19 (H) 0 - 6 %    Basophils Relative 1 0 - 1 %    Neutrophils Absolute 2 75 1 85 - 7 62 Thousands/µL    Immature Grans Absolute 0 00 0 00 - 0 20 Thousand/uL    Lymphocytes Absolute 0 71 0 60 - 4 47 Thousands/µL    Monocytes Absolute 0 40 0 17 - 1 22 Thousand/µL    Eosinophils Absolute 0 91 (H) 0 00 - 0 61 Thousand/µL Basophils Absolute 0 05 0 00 - 0 10 Thousands/µL   Comprehensive metabolic panel    Collection Time: 09/19/18  9:01 AM   Result Value Ref Range    Sodium 141 136 - 145 mmol/L    Potassium 3 9 3 5 - 5 3 mmol/L    Chloride 107 100 - 108 mmol/L    CO2 24 21 - 32 mmol/L    ANION GAP 10 4 - 13 mmol/L    BUN 6 5 - 25 mg/dL    Creatinine 0 79 0 60 - 1 30 mg/dL    Glucose 80 65 - 140 mg/dL    Calcium 8 7 8 3 - 10 1 mg/dL    AST 34 5 - 45 U/L    ALT 25 12 - 78 U/L    Alkaline Phosphatase 68 46 - 116 U/L    Total Protein 7 4 6 4 - 8 2 g/dL    Albumin 3 5 3 5 - 5 0 g/dL    Total Bilirubin 0 80 0 20 - 1 00 mg/dL    eGFR 123 ml/min/1 73sq m   Protime-INR    Collection Time: 09/19/18  9:01 AM   Result Value Ref Range    Protime 11 1 9 4 - 11 7 seconds    INR 1 06 0 86 - 1 16   APTT    Collection Time: 09/19/18  9:01 AM   Result Value Ref Range    PTT 29 24 - 33 seconds   Blood culture #1    Collection Time: 09/19/18  9:01 AM   Result Value Ref Range    Blood Culture No Growth at 48 hrs  Lactic acid, plasma    Collection Time: 09/19/18  9:01 AM   Result Value Ref Range    LACTIC ACID 1 2 0 5 - 2 0 mmol/L   Blood culture #2    Collection Time: 09/19/18  9:08 AM   Result Value Ref Range    Blood Culture No Growth at 48 hrs  MRSA culture    Collection Time: 09/19/18  5:49 PM   Result Value Ref Range    MRSA Culture Only Culture results to follow      Wound culture and Gram stain    Collection Time: 09/19/18  5:49 PM   Result Value Ref Range    Wound Culture 1+ Growth of Gram Negative Elliott (A)     Wound Culture 1+ Growth of Gram Negative Elliott (A)     Wound Culture 1+ Growth of Staphylococcus aureus (A)     Gram Stain Result No polys seen     Gram Stain Result No organisms seen    Basic metabolic panel    Collection Time: 09/20/18  6:30 AM   Result Value Ref Range    Sodium 139 136 - 145 mmol/L    Potassium 3 8 3 5 - 5 3 mmol/L    Chloride 108 100 - 108 mmol/L    CO2 24 21 - 32 mmol/L    ANION GAP 7 4 - 13 mmol/L    BUN 4 (L) 5 - 25 mg/dL    Creatinine 0 61 0 60 - 1 30 mg/dL    Glucose 87 65 - 140 mg/dL    Calcium 8 5 8 3 - 10 1 mg/dL    eGFR 149 ml/min/1 73sq m   Magnesium    Collection Time: 09/20/18  6:30 AM   Result Value Ref Range    Magnesium 1 8 1 6 - 2 6 mg/dL   Phosphorus    Collection Time: 09/20/18  6:30 AM   Result Value Ref Range    Phosphorus 3 7 2 7 - 4 5 mg/dL   CBC (With Platelets)    Collection Time: 09/20/18  6:30 AM   Result Value Ref Range    WBC 4 10 (L) 4 31 - 10 16 Thousand/uL    RBC 4 53 3 81 - 5 12 Million/uL    Hemoglobin 11 9 11 5 - 15 4 g/dL    Hematocrit 38 4 34 8 - 46 1 %    MCV 85 82 - 98 fL    MCH 26 3 (L) 26 8 - 34 3 pg    MCHC 31 0 (L) 31 4 - 37 4 g/dL    RDW 13 1 11 6 - 15 1 %    Platelets 742 373 - 170 Thousands/uL    MPV 10 3 8 9 - 12 7 fL   Vancomycin, trough 30 minutes before 4th dose    Collection Time: 09/20/18  9:45 PM   Result Value Ref Range    Vancomycin Tr 3 9 (L) 10 0 - 20 0 ug/mL   Basic metabolic panel    Collection Time: 09/21/18  5:22 AM   Result Value Ref Range    Sodium 137 136 - 145 mmol/L    Potassium 3 8 3 5 - 5 3 mmol/L    Chloride 105 100 - 108 mmol/L    CO2 22 21 - 32 mmol/L    ANION GAP 10 4 - 13 mmol/L    BUN 5 5 - 25 mg/dL    Creatinine 0 66 0 60 - 1 30 mg/dL    Glucose 90 65 - 140 mg/dL    Calcium 8 5 8 3 - 10 1 mg/dL    eGFR 145 ml/min/1 73sq m   CBC    Collection Time: 09/21/18  5:22 AM   Result Value Ref Range    WBC 7 88 4 31 - 10 16 Thousand/uL    RBC 4 58 3 81 - 5 12 Million/uL    Hemoglobin 12 1 11 5 - 15 4 g/dL    Hematocrit 38 5 34 8 - 46 1 %    MCV 84 82 - 98 fL    MCH 26 4 (L) 26 8 - 34 3 pg    MCHC 31 4 31 4 - 37 4 g/dL    RDW 13 2 11 6 - 15 1 %    Platelets 415 895 - 697 Thousands/uL    MPV 11 4 8 9 - 12 7 fL   Magnesium    Collection Time: 09/21/18  5:22 AM   Result Value Ref Range    Magnesium 1 6 1 6 - 2 6 mg/dL   Phosphorus    Collection Time: 09/21/18  5:22 AM   Result Value Ref Range    Phosphorus 4 0 2 7 - 4 5 mg/dL   CBC and differential    Collection Time: 09/21/18  5:22 AM   Result Value Ref Range    WBC 7 86 4 31 - 10 16 Thousand/uL    RBC 4 60 3 81 - 5 12 Million/uL    Hemoglobin 12 1 11 5 - 15 4 g/dL    Hematocrit 39 0 34 8 - 46 1 %    MCV 85 82 - 98 fL    MCH 26 3 (L) 26 8 - 34 3 pg    MCHC 31 0 (L) 31 4 - 37 4 g/dL    RDW 13 2 11 6 - 15 1 %    MPV 11 2 8 9 - 12 7 fL    Platelets 627 285 - 292 Thousands/uL    nRBC 0 /100 WBCs   Manual Differential(PHLEBS Do Not Order)    Collection Time: 09/21/18  5:22 AM   Result Value Ref Range    Segmented % 62 43 - 75 %    Bands % 14 (H) 0 - 8 %    Lymphocytes % 11 (L) 14 - 44 %    Monocytes % 4 4 - 12 %    Eosinophils % 9 (H) 0 - 6 %    Basophils % 0 0 - 1 %    Absolute Neutrophils 5 97 1 85 - 7 62 Thousand/uL    Lymphocytes Absolute 0 86 0 60 - 4 47 Thousand/uL    Monocytes Absolute 0 31 0 00 - 1 22 Thousand/uL    Eosinophils Absolute 0 71 (H) 0 00 - 0 40 Thousand/uL    Basophils Absolute 0 00 0 00 - 0 10 Thousand/uL    Total Counted 100     RBC Morphology Normal     Anisocytosis Present     Platelet Estimate Adequate Adequate       Results from last 7 days  Lab Units 09/21/18  0522 09/20/18  0630   MAGNESIUM mg/dL 1 6 1 8       Results from last 7 days  Lab Units 09/21/18  0522 09/20/18  0630   PHOSPHORUS mg/dL 4 0 3 7       Results from last 7 days  Lab Units 09/19/18  0901   INR  1 06   PTT seconds 29     No results found for: TROPONINT  ABG:No results found for: PHART, EDU2CLM, PO2ART, NFB5BNE, F2GASFIF, BEART, SOURCE        Cultures    Results from last 7 days  Lab Units 09/19/18  1749 09/19/18  0908 09/19/18  0901   BLOOD CULTURE   --  No Growth at 48 hrs  No Growth at 48 hrs  GRAM STAIN RESULT  No polys seen  No organisms seen  --   --    WOUND CULTURE  1+ Growth of Gram Negative Elliott*  1+ Growth of Gram Negative Elliott*  1+ Growth of Staphylococcus aureus*  --   --    MRSA CULTURE ONLY  Culture results to follow  --   --       No results found  I have personally reviewed pertinent reports        Principal Problem:    Impetigo  Active Problems:    Eczema    Asthma    Facial cellulitis    Vitiligo      Assessment/Plan: This is a young woman with severe eczematous dermatitis involving the face, asthma, vitiligo, who presented with painful weeping lesions on the face with swelling, and was noted to have eosinophilia  Agree probable facial cellulitis, most likely due to Staphylococcus aureus, less likely polymicrobial with Staphylococcus aureus and gram-negative bacilli  Gives remote history of MRSA infection  Therefore, is being treated with vancomycin  Topical and systemic steroid treatment has been started to address the primary problem  Will monitor course

## 2018-09-21 NOTE — PROGRESS NOTES
[facility] Practice Progress Note - Kaelyn Cleary 26 y o  female MRN: [id number]    Unit/Bed#: [address] [place] [address] Encounter: [id number]      Assessment/Plan:  * Impetigo   Assessment & Plan    Seen in ED 9/18 and given 1 dose IV cefazolin 2 mg and discharged with Rx Bactrim and Keflex PO with topical bactroban 2% ointment but patient did not fill due to inability to afford  Patient admitted for IV abx  ED given (9/19) Vancomycin 1 250 gm IV x1; Lactic Acid resulted 1 2; No leukocytosis  · Vancomycin 1 5g q6h IV (adjusted from Vanc Trough 3 9 on 9/20)  · MRSA pending  · F/u repeat Vancomycin Trough 9/21 @ 1730  · F/u Wound culture pending  · Blood Culture x2 NGTD  · IV Benadryl PRN for itching  · Serous weeping from face starting to drying out  · ID consult: endorses cellulitis in context with severe atopic dermatitis and continue Vancomycin        Facial cellulitis   Assessment & Plan    · Treated as above with Vancomycin IV  · Monitor  · Patient scratched face overnight causing excoriations, considering mittens or gloves with discussion with patient        Asthma   Assessment & Plan    Patient endorses has not needed to rescue inhaler  No maintenance reported  · Albuterol HFA 90 mcg take 2 puffs QID PRN        Severe eczema   Assessment & Plan    Had prior Rx with Halobetasol 0 05% not taking any more  · On Triamcinolone 0 1 oint BID to all affected areas  · Give 1 dose solumedrol 60 mg IV x1  · Per Dermatology consult over phone to avoid daily course of PO steroids would have reflex worsening once steroids stopped, may consider Keflex as PO antibiotic on discharge        Vitiligo   Assessment & Plan    · Stable              Subjective:   Patient seen and evaluated at bedside  Overnight patient was scratched her face including chin, cheeks and forehead  Areas are excoriated and some bleeding insued  Nurses applied bandages and gauze to area    Patient endorses still having pain and tightness over areas of eczema over face and neck on movement  Objective:    Vitals: Blood pressure 125/62, pulse 102, temperature 100 4 °F (38 °C), temperature source Oral, resp  rate 18, height 5' 2" (1 575 m), weight 79 5 kg (175 lb 5 7 oz), SpO2 99 %, not currently breastfeeding  ,Body mass index is 32 07 kg/m²  Wt Readings from Last 3 Encounters:   09/19/18 79 5 kg (175 lb 5 7 oz)   09/18/18 78 kg (172 lb)   05/16/18 77 1 kg (170 lb)     No intake or output data in the 24 hours ending 09/21/18 1011    Physical Exam: /62 (BP Location: Right arm)   Pulse 102   Temp 100 4 °F (38 °C) (Oral)   Resp 18   Ht 5' 2" (1 575 m)   Wt 79 5 kg (175 lb 5 7 oz)   SpO2 99%   Breastfeeding?  No   BMI 32 07 kg/m²   General appearance: alert and oriented, in no acute distress  Head: Normocephalic, without obvious abnormality, atraumatic  Lungs: clear to auscultation bilaterally  Heart: regular rate and rhythm, S1, S2 normal, no murmur, click, rub or gallop  Abdomen: soft, non-tender; bowel sounds normal; no masses,  no organomegaly  Extremities: extremities normal, warm and well-perfused; no cyanosis, clubbing, or edema  Pulses: 2+ and symmetric  Skin: pruritic, stiff dry skin with excoriations and with dressings C/D/I  Neurologic: Grossly normal     Recent Results (from the past 24 hour(s))   Vancomycin, trough 30 minutes before 4th dose    Collection Time: 09/20/18  9:45 PM   Result Value Ref Range    Vancomycin Tr 3 9 (L) 10 0 - 20 0 ug/mL   Basic metabolic panel    Collection Time: 09/21/18  5:22 AM   Result Value Ref Range    Sodium 137 136 - 145 mmol/L    Potassium 3 8 3 5 - 5 3 mmol/L    Chloride 105 100 - 108 mmol/L    CO2 22 21 - 32 mmol/L    ANION GAP 10 4 - 13 mmol/L    BUN 5 5 - 25 mg/dL    Creatinine 0 66 0 60 - 1 30 mg/dL    Glucose 90 65 - 140 mg/dL    Calcium 8 5 8 3 - 10 1 mg/dL    eGFR 145 ml/min/1 73sq m   CBC    Collection Time: 09/21/18  5:22 AM   Result Value Ref Range    WBC 7 88 4 31 - 10 16 Thousand/uL    RBC 4 58 3 81 - 5 12 Million/uL    Hemoglobin 12 1 11 5 - 15 4 g/dL    Hematocrit 38 5 34 8 - 46 1 %    MCV 84 82 - 98 fL    MCH 26 4 (L) 26 8 - 34 3 pg    MCHC 31 4 31 4 - 37 4 g/dL    RDW 13 2 11 6 - 15 1 %    Platelets 464 071 - 853 Thousands/uL    MPV 11 4 8 9 - 12 7 fL   Magnesium    Collection Time: 09/21/18  5:22 AM   Result Value Ref Range    Magnesium 1 6 1 6 - 2 6 mg/dL   Phosphorus    Collection Time: 09/21/18  5:22 AM   Result Value Ref Range    Phosphorus 4 0 2 7 - 4 5 mg/dL       Current Facility-Administered Medications   Medication Dose Route Frequency Provider Last Rate Last Dose    acetaminophen (TYLENOL) tablet 650 mg  650 mg Oral Q6H PRN Erinn Mathur MD   650 mg at 09/20/18 2200    albuterol (PROVENTIL HFA,VENTOLIN HFA) inhaler 2 puff  2 puff Inhalation 4x Daily Winda Rumple   2 puff at 09/21/18 0030    benzocaine (HURRICAINE) 20 % mucosal spray 2 spray  2 spray Mucosal BID PRN Dieynadolfo Valentino, DO   2 spray at 09/20/18 1730    benzonatate (TESSALON PERLES) capsule 100 mg  100 mg Oral TID PRN Winda Rumple        diphenhydrAMINE (BENADRYL) tablet 25 mg  25 mg Oral Q6H PRN Winda Rumple        mupirocin (BACTROBAN) 2 % ointment   Topical BID Winda Rumple        naproxen (NAPROSYN) tablet 250 mg  250 mg Oral BID With Meals Winda Rumple   250 mg at 09/20/18 1534    triamcinolone (KENALOG) 0 1 % ointment   Topical BID Dieynaba Chicho, DO        vancomycin (VANCOCIN) 1,500 mg in sodium chloride 0 9 % 250 mL IVPB  1,500 mg Intravenous Q6H [address] Pastora Burkett  7 mL/hr at 09/21/18 0630 1,500 mg at 09/21/18 0630    white petrolatum-mineral oil (EUCERIN,HYDROCERIN) cream   Topical PRN Erinn Mathur MD           Invasive Devices     Peripheral Intravenous Line            Peripheral IV 09/21/18 Left Antecubital less than 1 day                Lab, Imaging and other studies: I have personally reviewed pertinent reports      VTE Pharmacologic Prophylaxis: ambulate  VTE Mechanical Prophylaxis: sequential compression device    Johanna Antony

## 2018-09-22 LAB
ANION GAP SERPL CALCULATED.3IONS-SCNC: 7 MMOL/L (ref 4–13)
BACTERIA WND AEROBE CULT: ABNORMAL
BASOPHILS # BLD MANUAL: 0 THOUSAND/UL (ref 0–0.1)
BASOPHILS NFR MAR MANUAL: 0 % (ref 0–1)
BUN SERPL-MCNC: 5 MG/DL (ref 5–25)
CALCIUM SERPL-MCNC: 8.8 MG/DL (ref 8.3–10.1)
CHLORIDE SERPL-SCNC: 105 MMOL/L (ref 100–108)
CO2 SERPL-SCNC: 25 MMOL/L (ref 21–32)
CREAT SERPL-MCNC: 0.63 MG/DL (ref 0.6–1.3)
EOSINOPHIL # BLD MANUAL: 0.17 THOUSAND/UL (ref 0–0.4)
EOSINOPHIL NFR BLD MANUAL: 2 % (ref 0–6)
ERYTHROCYTE [DISTWIDTH] IN BLOOD BY AUTOMATED COUNT: 13 % (ref 11.6–15.1)
GFR SERPL CREATININE-BSD FRML MDRD: 147 ML/MIN/1.73SQ M
GLUCOSE SERPL-MCNC: 90 MG/DL (ref 65–140)
GRAM STN SPEC: ABNORMAL
GRAM STN SPEC: ABNORMAL
HCT VFR BLD AUTO: 38.1 % (ref 34.8–46.1)
HGB BLD-MCNC: 11.7 G/DL (ref 11.5–15.4)
LYMPHOCYTES # BLD AUTO: 1.95 THOUSAND/UL (ref 0.6–4.47)
LYMPHOCYTES # BLD AUTO: 23 % (ref 14–44)
MAGNESIUM SERPL-MCNC: 1.8 MG/DL (ref 1.6–2.6)
MCH RBC QN AUTO: 26.1 PG (ref 26.8–34.3)
MCHC RBC AUTO-ENTMCNC: 30.7 G/DL (ref 31.4–37.4)
MCV RBC AUTO: 85 FL (ref 82–98)
MONOCYTES # BLD AUTO: 0.17 THOUSAND/UL (ref 0–1.22)
MONOCYTES NFR BLD: 2 % (ref 4–12)
MRSA NOSE QL CULT: ABNORMAL
MRSA NOSE QL CULT: ABNORMAL
NEUTROPHILS # BLD MANUAL: 6.18 THOUSAND/UL (ref 1.85–7.62)
NEUTS BAND NFR BLD MANUAL: 2 % (ref 0–8)
NEUTS SEG NFR BLD AUTO: 71 % (ref 43–75)
NRBC BLD AUTO-RTO: 0 /100 WBCS
PHOSPHATE SERPL-MCNC: 3.9 MG/DL (ref 2.7–4.5)
PLATELET # BLD AUTO: 243 THOUSANDS/UL (ref 149–390)
PLATELET BLD QL SMEAR: ADEQUATE
PMV BLD AUTO: 10.2 FL (ref 8.9–12.7)
POTASSIUM SERPL-SCNC: 3.6 MMOL/L (ref 3.5–5.3)
RBC # BLD AUTO: 4.49 MILLION/UL (ref 3.81–5.12)
RBC MORPH BLD: NORMAL
SODIUM SERPL-SCNC: 137 MMOL/L (ref 136–145)
TOTAL CELLS COUNTED SPEC: 100
WBC # BLD AUTO: 8.46 THOUSAND/UL (ref 4.31–10.16)

## 2018-09-22 PROCEDURE — 84100 ASSAY OF PHOSPHORUS: CPT | Performed by: STUDENT IN AN ORGANIZED HEALTH CARE EDUCATION/TRAINING PROGRAM

## 2018-09-22 PROCEDURE — 80048 BASIC METABOLIC PNL TOTAL CA: CPT | Performed by: STUDENT IN AN ORGANIZED HEALTH CARE EDUCATION/TRAINING PROGRAM

## 2018-09-22 PROCEDURE — 83735 ASSAY OF MAGNESIUM: CPT | Performed by: STUDENT IN AN ORGANIZED HEALTH CARE EDUCATION/TRAINING PROGRAM

## 2018-09-22 PROCEDURE — 85027 COMPLETE CBC AUTOMATED: CPT | Performed by: FAMILY MEDICINE

## 2018-09-22 PROCEDURE — 85007 BL SMEAR W/DIFF WBC COUNT: CPT | Performed by: FAMILY MEDICINE

## 2018-09-22 RX ORDER — MAGNESIUM SULFATE 1 G/100ML
1 INJECTION INTRAVENOUS ONCE
Status: COMPLETED | OUTPATIENT
Start: 2018-09-22 | End: 2018-09-22

## 2018-09-22 RX ORDER — HYDROXYZINE HYDROCHLORIDE 25 MG/1
25 TABLET, FILM COATED ORAL ONCE
Status: COMPLETED | OUTPATIENT
Start: 2018-09-22 | End: 2018-09-22

## 2018-09-22 RX ORDER — DIPHENHYDRAMINE HYDROCHLORIDE 50 MG/ML
12.5 INJECTION INTRAMUSCULAR; INTRAVENOUS ONCE
Status: COMPLETED | OUTPATIENT
Start: 2018-09-22 | End: 2018-09-22

## 2018-09-22 RX ADMIN — TRIAMCINOLONE ACETONIDE: 1 OINTMENT TOPICAL at 21:23

## 2018-09-22 RX ADMIN — NAPROXEN 250 MG: 250 TABLET ORAL at 17:35

## 2018-09-22 RX ADMIN — DIPHENHYDRAMINE HCL 25 MG: 25 TABLET ORAL at 00:29

## 2018-09-22 RX ADMIN — DIPHENHYDRAMINE HYDROCHLORIDE 12.5 MG: 50 INJECTION, SOLUTION INTRAMUSCULAR; INTRAVENOUS at 23:55

## 2018-09-22 RX ADMIN — ACETAMINOPHEN 650 MG: 325 TABLET, FILM COATED ORAL at 14:17

## 2018-09-22 RX ADMIN — VANCOMYCIN HYDROCHLORIDE 1500 MG: 10 INJECTION, POWDER, LYOPHILIZED, FOR SOLUTION INTRAVENOUS at 00:30

## 2018-09-22 RX ADMIN — VANCOMYCIN HYDROCHLORIDE 1500 MG: 10 INJECTION, POWDER, LYOPHILIZED, FOR SOLUTION INTRAVENOUS at 13:08

## 2018-09-22 RX ADMIN — HYDROXYZINE HYDROCHLORIDE 25 MG: 25 TABLET, FILM COATED ORAL at 01:17

## 2018-09-22 RX ADMIN — CEFEPIME HYDROCHLORIDE 1000 MG: 1 INJECTION, SOLUTION INTRAVENOUS at 23:56

## 2018-09-22 RX ADMIN — MAGNESIUM SULFATE HEPTAHYDRATE 1 G: 1 INJECTION, SOLUTION INTRAVENOUS at 10:06

## 2018-09-22 RX ADMIN — CEFEPIME HYDROCHLORIDE 1000 MG: 1 INJECTION, SOLUTION INTRAVENOUS at 15:46

## 2018-09-22 RX ADMIN — DIPHENHYDRAMINE HCL 25 MG: 25 TABLET ORAL at 20:54

## 2018-09-22 RX ADMIN — VANCOMYCIN HYDROCHLORIDE 1500 MG: 10 INJECTION, POWDER, LYOPHILIZED, FOR SOLUTION INTRAVENOUS at 06:21

## 2018-09-22 RX ADMIN — ALBUTEROL SULFATE 2 PUFF: 90 AEROSOL, METERED RESPIRATORY (INHALATION) at 10:05

## 2018-09-22 RX ADMIN — Medication: at 10:05

## 2018-09-22 RX ADMIN — TRIAMCINOLONE ACETONIDE: 1 OINTMENT TOPICAL at 10:07

## 2018-09-22 RX ADMIN — DIPHENHYDRAMINE HCL 25 MG: 25 TABLET ORAL at 06:23

## 2018-09-22 RX ADMIN — ACETAMINOPHEN 650 MG: 325 TABLET, FILM COATED ORAL at 20:54

## 2018-09-22 RX ADMIN — VANCOMYCIN HYDROCHLORIDE 1500 MG: 10 INJECTION, POWDER, LYOPHILIZED, FOR SOLUTION INTRAVENOUS at 17:10

## 2018-09-22 RX ADMIN — Medication: at 01:19

## 2018-09-22 RX ADMIN — DIPHENHYDRAMINE HCL 25 MG: 25 TABLET ORAL at 13:38

## 2018-09-22 RX ADMIN — ALBUTEROL SULFATE 2 PUFF: 90 AEROSOL, METERED RESPIRATORY (INHALATION) at 21:24

## 2018-09-22 RX ADMIN — NAPROXEN 250 MG: 250 TABLET ORAL at 10:06

## 2018-09-22 NOTE — PROGRESS NOTES
Progress Note - Infectious Disease   Jody Queen 26 y o  female MRN: [id number]  Unit/Bed#: [address] [place] [address] Encounter: [id number]        Assessment:  Skin cultures with MSSA, Pseudomonas putida and Enterobacter cloacae complex  MRSA screen is positive      Plan: Will add cefepime      Subjective/Objective     Subjective: Infected eczematous lesions mainly in the head and neck area    HR:  [] 104  Resp:  [17-18] 17  BP: (116-140)/(58-72) 140/72  SpO2:  [97 %-100 %] 100 %      Objective: see PE    Meds/Allergies     Current Facility-Administered Medications:     acetaminophen (TYLENOL) tablet 650 mg, 650 mg, Oral, Q6H PRN, Cristino Wisdom MD, 650 mg at 09/21/18 1032    albuterol (PROVENTIL HFA,VENTOLIN HFA) inhaler 2 puff, 2 puff, Inhalation, 4x Daily, Curlene Calamus, 2 puff at 09/22/18 1005    benzonatate (TESSALON PERLES) capsule 100 mg, 100 mg, Oral, TID PRN, Curlene Calamus    diphenhydrAMINE (BENADRYL) tablet 25 mg, 25 mg, Oral, Q6H PRN, Curlene Calamus, 25 mg at 09/22/18 0771    morphine injection 2 mg, 2 mg, Intravenous, Once PRN, Curlene Calamus    naproxen (NAPROSYN) tablet 250 mg, 250 mg, Oral, BID With Meals, Curlene Calamus, 250 mg at 09/22/18 1006    triamcinolone (KENALOG) 0 1 % ointment, , Topical, BID, Vincenzo Valentino,     vancomycin (VANCOCIN) 1,500 mg in sodium chloride 0 9 % 250 mL IVPB, 1,500 mg, Intravenous, Q6H [facility], Cristino Wisdom MD, Last Rate: 166 7 mL/hr at 09/22/18 0621, 1,500 mg at 09/22/18 2459    white petrolatum-mineral oil (EUCERIN,HYDROCERIN) cream, , Topical, PRN, Cristino Wisdom MD  Allergies   Allergen Reactions    Peanuts [Peanut Oil] Itching     all current active meds have been reviewed    all current active meds have been reviewed    Physical Exam: Physical Exam   Constitutional: She is oriented to person, place, and time  She appears well-developed and well-nourished  HENT:   Head: Normocephalic and atraumatic     Eyes: Pupils are equal, round, and reactive to light  No scleral icterus  Neck: Neck supple  No thyromegaly present  Cardiovascular: Normal heart sounds  Pulmonary/Chest: No respiratory distress  Abdominal: Soft  Bowel sounds are normal    Musculoskeletal: Normal range of motion  Neurological: She is alert and oriented to person, place, and time  Skin: Rash noted  Infected eczematous rash in face and neck   Psychiatric: She has a normal mood and affect  Nursing note and vitals reviewed  Temp (24hrs), Av 8 °F (37 1 °C), Min:98 4 °F (36 9 °C), Max:99 °F (37 2 °C)  Current: Temperature: 99 °F (37 2 °C)    Invasive Devices     Peripheral Intravenous Line            Peripheral IV 18 Left Antecubital 1 day                            Lab, Imaging and other studies:      CBC: Lab Results   Component Value Date    WBC 8 46 2018    RBC 4 49 2018       Results from last 7 days  Lab Units 18  0621 18  0522 18  0630 18  0901 18  1145   PLATELETS Thousands/uL 243 263  257 246 277 262     CMP: Lab Results   Component Value Date     2018     2018     2018    CO2 25 2018    CO2 24 2018    BUN 5 2018    BUN 7 2018    CREATININE 0 63 2018    CALCIUM 8 8 2018    AST 34 2018    ALT 25 2018    ALKPHOS 68 2018    EGFR 147 2018       Urinalysis: No results found for: COLORU, CLARITYU, SPECGRAV, PHUR, LEUKOCYTESUR, NITRITE, PROTEINUA, GLUCOSEU, KETONESU, BILIRUBINUR, BLOODU    Lactic Acid:   Lab Results   Component Value Date    LACTICACID 1 2 2018        Cultures    Results from last 7 days  Lab Units 18  1749 18  0908 18  0901   BLOOD CULTURE   --  No Growth at 48 hrs  No Growth at 48 hrs     GRAM STAIN RESULT  No polys seen  No organisms seen  --   --    WOUND CULTURE  1+ Growth of Enterobacter cloacae complex*  1+ Growth of Pseudomonas putida group*  1+ Growth of Staphylococcus aureus*  --   --    MRSA CULTURE ONLY  Methicillin Resistant Staphylococcus aureus isolated*  Please note: This patient requires contact precautions  --   --        I have personally reviewed pertinent reports        Principal Problem:    Impetigo  Active Problems:    Severe eczema    Asthma    Facial cellulitis    Vitiligo

## 2018-09-22 NOTE — PROGRESS NOTES
[address] [facility] [address] [facility] Practice Progress Note - George Erwin 26 y o  female MRN: [id number]    Unit/Bed#: 3 [place] 314[address]01 Encounter: [id number]      Assessment/Plan:  * Impetigo   Assessment & Plan    MRSA Positive Wound cx 1+ Growth of Enterobacter cloacae complex, 1+ Growth of Pseudomonas putida group, 1+ Growth of Staphylococcus aureus  Vanc trough 9/21 was 20 9  · Vancomycin day 3 1 5g q6h IV (adjusted from Vanc Trough 3 9 on 9/20)  · FU Vanc Trough  · Blood Culture x2 NGTD  · IV Benadryl PRN for itching  · Serous weeping from face starting to drying out  · ID consult  · endorses cellulitis in context with severe atopic dermatitis and continue Vancomycin  · 9/22 added cefepime 1 g q8h        Facial cellulitis   Assessment & Plan     Swelling decreased  Skin cultures with MSSA, Pseudomonas putida and Enterobacter cloacae complex  · Consulted ID  · Added Cefepime 1 g q8h on 9/22  · Continuing Vancomycin 1 5 g IV q6h   · Vanc trough 20 9 FU Vanc trough  · Monitor and continue to emphasize not scratching face  · Pain control with morphine IV PRN        Severe eczema   Assessment & Plan    Had prior Rx with Halobetasol 0 05% not taking any more  · On Triamcinolone 0 1 oint BID to all affected areas  · white petrolatum-mineral oil (EUCERIN,HYDROCERIN) cream prn  · Per Dermatology consult over phone to avoid daily course of PO steroids would have reflex worsening once steroids stopped, may consider Keflex as PO antibiotic on discharge        Vitiligo   Assessment & Plan    · Stable        Asthma   Assessment & Plan    Patient endorses has not needed to rescue inhaler  No maintenance reported  · Albuterol HFA 90 mcg take 2 puffs QID PRN            Global: SCD, Regular Diet    Subjective:   Pt was seen and examined at bedside  Her face is currently wrapped in bandages so it is difficult to appreciate the healing of the previously; however there is diminished swelling   The patient is alert and oriented to time and place but her skin condition still causes her much discomfort and she was seen scratching her body during bedside examination  Objective:     Vitals: Blood pressure 120/57, pulse 102, temperature 100 2 °F (37 9 °C), temperature source Oral, resp  rate 18, height 5' 2" (1 575 m), weight 79 5 kg (175 lb 5 7 oz), SpO2 99 %, not currently breastfeeding  ,Body mass index is 32 07 kg/m²  Wt Readings from Last 3 Encounters:   09/19/18 79 5 kg (175 lb 5 7 oz)   09/18/18 78 kg (172 lb)   05/16/18 77 1 kg (170 lb)       Intake/Output Summary (Last 24 hours) at 09/22/18 1844  Last data filed at 09/22/18 1120   Gross per 24 hour   Intake              360 ml   Output                0 ml   Net              360 ml       Physical Exam: General appearance: alert and oriented, in no acute distress  Lungs: clear to auscultation bilaterally  Heart: regular rate and rhythm, S1, S2 normal, no murmur, click, rub or gallop  Abdomen: soft, non-tender; bowel sounds normal; no masses,  no organomegaly  Skin: eczema - generalized, excoriation - generalized, scar - generalized and healing wounds wrapped in bandages all throughout face   Facial swelling has decreased from previous day     Recent Results (from the past 24 hour(s))   Basic metabolic panel    Collection Time: 09/22/18  6:21 AM   Result Value Ref Range    Sodium 137 136 - 145 mmol/L    Potassium 3 6 3 5 - 5 3 mmol/L    Chloride 105 100 - 108 mmol/L    CO2 25 21 - 32 mmol/L    ANION GAP 7 4 - 13 mmol/L    BUN 5 5 - 25 mg/dL    Creatinine 0 63 0 60 - 1 30 mg/dL    Glucose 90 65 - 140 mg/dL    Calcium 8 8 8 3 - 10 1 mg/dL    eGFR 147 ml/min/1 73sq m   Magnesium    Collection Time: 09/22/18  6:21 AM   Result Value Ref Range    Magnesium 1 8 1 6 - 2 6 mg/dL   Phosphorus    Collection Time: 09/22/18  6:21 AM   Result Value Ref Range    Phosphorus 3 9 2 7 - 4 5 mg/dL   CBC and differential    Collection Time: 09/22/18  6:21 AM   Result Value Ref Range    WBC 8 46 4 31 - 10 16 Thousand/uL    RBC 4  49 3 81 - 5 12 Million/uL    Hemoglobin 11 7 11 5 - 15 4 g/dL    Hematocrit 38 1 34 8 - 46 1 %    MCV 85 82 - 98 fL    MCH 26 1 (L) 26 8 - 34 3 pg    MCHC 30 7 (L) 31 4 - 37 4 g/dL    RDW 13 0 11 6 - 15 1 %    MPV 10 2 8 9 - 12 7 fL    Platelets 249 511 - 265 Thousands/uL    nRBC 0 /100 WBCs   Manual Differential(PHLEBS Do Not Order)    Collection Time: 09/22/18  6:21 AM   Result Value Ref Range    Segmented % 71 43 - 75 %    Bands % 2 0 - 8 %    Lymphocytes % 23 14 - 44 %    Monocytes % 2 (L) 4 - 12 %    Eosinophils % 2 0 - 6 %    Basophils % 0 0 - 1 %    Absolute Neutrophils 6 18 1 85 - 7 62 Thousand/uL    Lymphocytes Absolute 1 95 0 60 - 4 47 Thousand/uL    Monocytes Absolute 0 17 0 00 - 1 22 Thousand/uL    Eosinophils Absolute 0 17 0 00 - 0 40 Thousand/uL    Basophils Absolute 0 00 0 00 - 0 10 Thousand/uL    Total Counted 100     RBC Morphology Normal     Platelet Estimate Adequate Adequate       Current Facility-Administered Medications   Medication Dose Route Frequency Provider Last Rate Last Dose    acetaminophen (TYLENOL) tablet 650 mg  650 mg Oral Q6H PRN Katie Terry MD   650 mg at 09/22/18 1417    albuterol (PROVENTIL HFA,VENTOLIN HFA) inhaler 2 puff  2 puff Inhalation 4x Daily Maudry Quinn   2 puff at 09/22/18 1005    benzonatate (TESSALON PERLES) capsule 100 mg  100 mg Oral TID PRN Maudry Quinn        cefepime (MAXIPIME) IVPB (premix) 1,000 mg  1,000 mg Intravenous Q8H Jesika Gomez  mL/hr at 09/22/18 1546 1,000 mg at 09/22/18 1546    diphenhydrAMINE (BENADRYL) tablet 25 mg  25 mg Oral Q6H PRN Maudry Quinn   25 mg at 09/22/18 1338    morphine injection 2 mg  2 mg Intravenous Once PRN Maudry Quinn        naproxen (NAPROSYN) tablet 250 mg  250 mg Oral BID With Meals Maudry Quinn   250 mg at 09/22/18 1735    triamcinolone (KENALOG) 0 1 % ointment   Topical BID Dieynaba Chicho, DO        vancomycin (VANCOCIN) 1,500 mg in sodium chloride 0 9 % 250 mL IVPB  1,500 mg Intravenous Q6H [facility] Tian Gross  7 mL/hr at 09/22/18 1308 1,500 mg at 09/22/18 1308    white petrolatum-mineral oil (EUCERIN,HYDROCERIN) cream   Topical PRN Tian Gross MD           Invasive Devices     Peripheral Intravenous Line            Peripheral IV 09/21/18 Left Antecubital 1 day                Lab, Imaging and other studies: I have personally reviewed pertinent reports      VTE Pharmacologic Prophylaxis: Reason for no pharmacologic prophylaxis no risk factors for pharmacologic prophylaxis  VTE Mechanical Prophylaxis: sequential compression device    Merry Oliveira MD

## 2018-09-23 LAB
ANION GAP SERPL CALCULATED.3IONS-SCNC: 8 MMOL/L (ref 4–13)
BUN SERPL-MCNC: 6 MG/DL (ref 5–25)
CALCIUM SERPL-MCNC: 8.7 MG/DL (ref 8.3–10.1)
CHLORIDE SERPL-SCNC: 103 MMOL/L (ref 100–108)
CO2 SERPL-SCNC: 25 MMOL/L (ref 21–32)
CREAT SERPL-MCNC: 0.69 MG/DL (ref 0.6–1.3)
GFR SERPL CREATININE-BSD FRML MDRD: 143 ML/MIN/1.73SQ M
GLUCOSE SERPL-MCNC: 94 MG/DL (ref 65–140)
MAGNESIUM SERPL-MCNC: 1.9 MG/DL (ref 1.6–2.6)
PHOSPHATE SERPL-MCNC: 3.9 MG/DL (ref 2.7–4.5)
POTASSIUM SERPL-SCNC: 3.8 MMOL/L (ref 3.5–5.3)
SODIUM SERPL-SCNC: 136 MMOL/L (ref 136–145)

## 2018-09-23 PROCEDURE — 84100 ASSAY OF PHOSPHORUS: CPT | Performed by: STUDENT IN AN ORGANIZED HEALTH CARE EDUCATION/TRAINING PROGRAM

## 2018-09-23 PROCEDURE — 83735 ASSAY OF MAGNESIUM: CPT | Performed by: STUDENT IN AN ORGANIZED HEALTH CARE EDUCATION/TRAINING PROGRAM

## 2018-09-23 PROCEDURE — 80048 BASIC METABOLIC PNL TOTAL CA: CPT | Performed by: STUDENT IN AN ORGANIZED HEALTH CARE EDUCATION/TRAINING PROGRAM

## 2018-09-23 RX ORDER — DIPHENHYDRAMINE HCL 25 MG
12.5 TABLET ORAL EVERY 6 HOURS PRN
Status: DISCONTINUED | OUTPATIENT
Start: 2018-09-23 | End: 2018-09-24

## 2018-09-23 RX ORDER — DIPHENHYDRAMINE HCL 25 MG
12.5 TABLET ORAL ONCE
Status: COMPLETED | OUTPATIENT
Start: 2018-09-23 | End: 2018-09-23

## 2018-09-23 RX ORDER — HYDROXYZINE HYDROCHLORIDE 25 MG/1
25 TABLET, FILM COATED ORAL EVERY 6 HOURS PRN
Status: DISCONTINUED | OUTPATIENT
Start: 2018-09-23 | End: 2018-09-23

## 2018-09-23 RX ORDER — CLOBETASOL PROPIONATE 0.5 MG/G
CREAM TOPICAL 2 TIMES DAILY
Status: DISCONTINUED | OUTPATIENT
Start: 2018-09-23 | End: 2018-09-25

## 2018-09-23 RX ADMIN — ALBUTEROL SULFATE 2 PUFF: 90 AEROSOL, METERED RESPIRATORY (INHALATION) at 18:20

## 2018-09-23 RX ADMIN — DIPHENHYDRAMINE HCL 12.5 MG: 25 TABLET ORAL at 23:16

## 2018-09-23 RX ADMIN — ACETAMINOPHEN 650 MG: 325 TABLET, FILM COATED ORAL at 09:16

## 2018-09-23 RX ADMIN — VANCOMYCIN HYDROCHLORIDE 1500 MG: 10 INJECTION, POWDER, LYOPHILIZED, FOR SOLUTION INTRAVENOUS at 11:37

## 2018-09-23 RX ADMIN — Medication: at 16:30

## 2018-09-23 RX ADMIN — CEFEPIME HYDROCHLORIDE 1000 MG: 1 INJECTION, SOLUTION INTRAVENOUS at 23:20

## 2018-09-23 RX ADMIN — DIPHENHYDRAMINE HCL 12.5 MG: 25 TABLET ORAL at 12:57

## 2018-09-23 RX ADMIN — NAPROXEN 250 MG: 250 TABLET ORAL at 09:23

## 2018-09-23 RX ADMIN — MORPHINE SULFATE 2 MG: 2 INJECTION, SOLUTION INTRAMUSCULAR; INTRAVENOUS at 10:34

## 2018-09-23 RX ADMIN — ALBUTEROL SULFATE 2 PUFF: 90 AEROSOL, METERED RESPIRATORY (INHALATION) at 11:40

## 2018-09-23 RX ADMIN — ALBUTEROL SULFATE 2 PUFF: 90 AEROSOL, METERED RESPIRATORY (INHALATION) at 09:24

## 2018-09-23 RX ADMIN — NAPROXEN 250 MG: 250 TABLET ORAL at 16:28

## 2018-09-23 RX ADMIN — CEFEPIME HYDROCHLORIDE 1000 MG: 1 INJECTION, SOLUTION INTRAVENOUS at 09:20

## 2018-09-23 RX ADMIN — VANCOMYCIN HYDROCHLORIDE 1500 MG: 10 INJECTION, POWDER, LYOPHILIZED, FOR SOLUTION INTRAVENOUS at 06:23

## 2018-09-23 RX ADMIN — VANCOMYCIN HYDROCHLORIDE 1500 MG: 10 INJECTION, POWDER, LYOPHILIZED, FOR SOLUTION INTRAVENOUS at 00:49

## 2018-09-23 RX ADMIN — ALBUTEROL SULFATE 2 PUFF: 90 AEROSOL, METERED RESPIRATORY (INHALATION) at 21:31

## 2018-09-23 RX ADMIN — VANCOMYCIN HYDROCHLORIDE 1500 MG: 10 INJECTION, POWDER, LYOPHILIZED, FOR SOLUTION INTRAVENOUS at 18:11

## 2018-09-23 RX ADMIN — CLOBETASOL PROPIONATE 1 APPLICATION: 0.5 CREAM TOPICAL at 16:37

## 2018-09-23 RX ADMIN — CEFEPIME HYDROCHLORIDE 1000 MG: 1 INJECTION, SOLUTION INTRAVENOUS at 16:22

## 2018-09-23 RX ADMIN — DIPHENHYDRAMINE HCL 25 MG: 25 TABLET ORAL at 09:17

## 2018-09-23 NOTE — PROGRESS NOTES
Progress Note - Infectious Disease   Ene Chinchilla 26 y o  female MRN: [id number]  Unit/Bed#: [address] [place] [address] Encounter: [id number]        Assessment:  Eczematous skin lesions infected with polymicrobial reed  MRSA screen is positive but the organism grown from skin lesions is MSSA    Plan:  Patient could be switched  to Levaquin and Bactrim based on susceptibilities  But previously patient has not filled the prescription for oral antibiotics because she could not afford  Subjective/Objective     Subjective:  T-max 100 2° yesterday  No fever at this time      HR:  [98-99] 99  Resp:  [18] 18  BP: (116-131)/(60-79) 131/79  SpO2:  [96 %-99 %] 99 %      Objective:  Itchy skin lesions in face and neck    Meds/Allergies     Current Facility-Administered Medications:     acetaminophen (TYLENOL) tablet 650 mg, 650 mg, Oral, Q6H PRN, Michele Mcmanus MD, 650 mg at 09/23/18 0916    albuterol (PROVENTIL HFA,VENTOLIN HFA) inhaler 2 puff, 2 puff, Inhalation, 4x Daily, Gregor Esthela, 2 puff at 09/23/18 1140    benzonatate (TESSALON PERLES) capsule 100 mg, 100 mg, Oral, TID PRN, Gregor Esthela    cefepime (MAXIPIME) IVPB (premix) 1,000 mg, 1,000 mg, Intravenous, Q8H, Sergey Pinto MD, Last Rate: 100 mL/hr at 09/23/18 0920, 1,000 mg at 09/23/18 0920    clobetasol (TEMOVATE) 0 05 % cream, , Topical, BID, Colten Valles MD    hydrOXYzine HCL (ATARAX) tablet 25 mg, 25 mg, Oral, Q6H PRN, Colten Valles MD    naproxen (NAPROSYN) tablet 250 mg, 250 mg, Oral, BID With Meals, Gregor Esthela, 250 mg at 09/23/18 0923    vancomycin (VANCOCIN) 1,500 mg in sodium chloride 0 9 % 250 mL IVPB, 1,500 mg, Intravenous, Q6H [facility], Michele Mcmanus MD, Last Rate: 166 7 mL/hr at 09/23/18 1137, 1,500 mg at 09/23/18 1137    white petrolatum-mineral oil (EUCERIN,HYDROCERIN) cream, , Topical, PRN, Michele Mcmanus MD  Allergies   Allergen Reactions    Peanuts [Peanut Oil] Itching     all current active meds have been reviewed    all current active meds have been reviewed    Physical Exam: Physical Exam   Constitutional: She appears well-developed and well-nourished  HENT:   Head: Normocephalic  Eyes: Pupils are equal, round, and reactive to light  No scleral icterus  Neck: Neck supple  No thyromegaly present  Cardiovascular: Normal heart sounds  Pulmonary/Chest: No respiratory distress  Abdominal: Soft  Musculoskeletal: Normal range of motion  Neurological: She is alert  Skin: Rash noted  There is erythema  Eczematous skin lesions in face and neck   Psychiatric: She has a normal mood and affect  Nursing note and vitals reviewed  Temp (24hrs), Av 2 °F (37 3 °C), Min:98 6 °F (37 °C), Max:99 7 °F (37 6 °C)  Current: Temperature: 98 6 °F (37 °C)    Invasive Devices     Peripheral Intravenous Line            Peripheral IV 18 Left Antecubital 2 days                            Lab, Imaging and other studies:      CBC: Lab Results   Component Value Date    WBC 8 46 2018    RBC 4 49 2018       Results from last 7 days  Lab Units 18  0621 18  0522 18  0630 18  0901 18  1145   PLATELETS Thousands/uL 243 263  257 246 277 262     CMP: Lab Results   Component Value Date     2018     2018     2018    CO2 25 2018    CO2 24 2018    BUN 6 2018    BUN 7 2018    CREATININE 0 69 2018    CALCIUM 8 7 2018    AST 34 2018    ALT 25 2018    ALKPHOS 68 2018    EGFR 143 2018       Urinalysis: No results found for: COLORU, CLARITYU, SPECGRAV, PHUR, LEUKOCYTESUR, NITRITE, PROTEINUA, GLUCOSEU, KETONESU, BILIRUBINUR, BLOODU    Lactic Acid:   Lab Results   Component Value Date    LACTICACID 1 2 2018        Cultures    Results from last 7 days  Lab Units 18  1749 18  0908 18  0901   BLOOD CULTURE   --  No Growth After 4 Days  No Growth After 4 Days     GRAM STAIN RESULT  No polys seen  No organisms seen  --   --    WOUND CULTURE  1+ Growth of Enterobacter cloacae complex*  1+ Growth of Pseudomonas putida group*  1+ Growth of Staphylococcus aureus*  --   --    MRSA CULTURE ONLY  Methicillin Resistant Staphylococcus aureus isolated*  Please note: This patient requires contact precautions  --   --        I have personally reviewed pertinent reports        Principal Problem:    Impetigo  Active Problems:    Severe eczema    Asthma    Facial cellulitis    Vitiligo

## 2018-09-23 NOTE — PROGRESS NOTES
Patient complained of being cold at 0900, heat turned up to 70 in her room  Patients grandmother called at 56 stating patient was complaining of being cold  Covered air vents in room and offered to cover patient with warm blankets but she refused and stated she was "okay"

## 2018-09-23 NOTE — PROGRESS NOTES
[address] [facility] [address] [facility] Practice Progress Note - Guera Russo 26 y o  female MRN: [id number]    Unit/Bed#: 3 [place] 314-01 Encounter: [id number]      Assessment/Plan:  * Impetigo   Assessment & Plan    · Pt with MRSA positive in the nares and wound cx growing 1+ Growth of Enterobacter cloacae complex, 1+ Growth of Pseudomonas putida group, 1+ Growth of Staphylococcus aureus  · Blood cultures negative x 2  · Continue IV Vancomycin and Cefepime, per Dr Aida Chase, pt may be discharged on Levaquin and Bactrim based on susceptibilities  · IV Atarax PRN for itching  · Will start Clobetasol cream BID on face and neck   · ID consult  · endorses cellulitis in context with severe atopic dermatitis  · Continue IV Cefepime and Vancomycin        Facial cellulitis   Assessment & Plan    · Manage as above  · Monitor and continue to emphasize not scratching face  · Pain control with morphine IV PRN        Severe eczema   Assessment & Plan    · Pt was not compliant with home topical steroid regimen  Switched Triamcinalone to more potent topical Clobetasol cream today which is to be put on face and neck BID  · white petrolatum-mineral oil (EUCERIN,HYDROCERIN) cream prn  · Per Dermatology consult over phone to avoid daily course of PO steroids would have reflex worsening once steroids stopped, may consider Keflex as PO antibiotic on discharge        Vitiligo   Assessment & Plan    · Stable        Asthma   Assessment & Plan    · Patient endorses has not needed to rescue inhaler  No maintenance reported  · Albuterol HFA 90 mcg take 2 puffs QID PRN              Subjective:   Pt seen and examined at bedside  No acute overnight events  Pt states her face continues to be itchy and swollen  Denies fevers, chills, headaches, dizziness, nausea, vomiting, pain  Objective:     Vitals: Blood pressure 131/79, pulse 99, temperature 98 6 °F (37 °C), temperature source Oral, resp   rate 18, height 5' 2" (1 575 m), weight 79 5 kg (175 lb 5 7 oz), SpO2 99 %, not currently breastfeeding  ,Body mass index is 32 07 kg/m²  Wt Readings from Last 3 Encounters:   09/19/18 79 5 kg (175 lb 5 7 oz)   09/18/18 78 kg (172 lb)   05/16/18 77 1 kg (170 lb)       Intake/Output Summary (Last 24 hours) at 09/23/18 1521  Last data filed at 09/22/18 1710   Gross per 24 hour   Intake              240 ml   Output                0 ml   Net              240 ml       Physical Exam:     General: Pt is AAO x 3, not in any acute distress  Cardio: RRR, S1 and S2 +, no murmurs/rubs/gallops/clicks  Resp: CTA b/l, no wheezes/rales/rhonchi  Abdomen: soft, NT/ND, BS+  Extremities: no cyanosis or edema  PP 2+ b/l  Face: Dressing applied to face and neck which is C/D/I  Pt has eczematous skin lesions throughout face and neck with some swelling       Recent Results (from the past 24 hour(s))   Basic metabolic panel    Collection Time: 09/23/18  6:28 AM   Result Value Ref Range    Sodium 136 136 - 145 mmol/L    Potassium 3 8 3 5 - 5 3 mmol/L    Chloride 103 100 - 108 mmol/L    CO2 25 21 - 32 mmol/L    ANION GAP 8 4 - 13 mmol/L    BUN 6 5 - 25 mg/dL    Creatinine 0 69 0 60 - 1 30 mg/dL    Glucose 94 65 - 140 mg/dL    Calcium 8 7 8 3 - 10 1 mg/dL    eGFR 143 ml/min/1 73sq m   Magnesium    Collection Time: 09/23/18  6:28 AM   Result Value Ref Range    Magnesium 1 9 1 6 - 2 6 mg/dL   Phosphorus    Collection Time: 09/23/18  6:28 AM   Result Value Ref Range    Phosphorus 3 9 2 7 - 4 5 mg/dL       Current Facility-Administered Medications   Medication Dose Route Frequency Provider Last Rate Last Dose    acetaminophen (TYLENOL) tablet 650 mg  650 mg Oral Q6H PRN Elba Guerra MD   650 mg at 09/23/18 0916    albuterol (PROVENTIL HFA,VENTOLIN HFA) inhaler 2 puff  2 puff Inhalation 4x Daily Kayla Maganch   2 puff at 09/23/18 1140    benzonatate (TESSALON PERLES) capsule 100 mg  100 mg Oral TID PRN Kayla Mulch        cefepime (MAXIPIME) IVPB (premix) 1,000 mg  1,000 mg Intravenous Q8H Ilda Mccall Roseline Murguia  mL/hr at 09/23/18 0920 1,000 mg at 09/23/18 0920    clobetasol (TEMOVATE) 0 05 % cream   Topical BID Rio Serna MD        hydrOXYzine HCL (ATARAX) tablet 25 mg  25 mg Oral Q6H PRN Rio Serna MD        naproxen (NAPROSYN) tablet 250 mg  250 mg Oral BID With Meals [place]   250 mg at 09/23/18 2828    vancomycin (VANCOCIN) 1,500 mg in sodium chloride 0 9 % 250 mL IVPB  1,500 mg Intravenous Q6H [address] Kiana Murrell  7 mL/hr at 09/23/18 1137 1,500 mg at 09/23/18 1137    white petrolatum-mineral oil (EUCERIN,HYDROCERIN) cream   Topical PRN Kiana Murrell MD           Invasive Devices     Peripheral Intravenous Line            Peripheral IV 09/21/18 Left Antecubital 2 days                Lab, Imaging and other studies: I have personally reviewed pertinent reports      VTE Pharmacologic Prophylaxis: Reason for no pharmacologic prophylaxis Pt is able to walk and has no risk factors for pharmacologic prophylaxis  VTE Mechanical Prophylaxis: sequential compression device    Rio Serna MD

## 2018-09-23 NOTE — PROGRESS NOTES
Offered to apply cream and change dressing in the morning but patient refused stating she was in too much pain  Gave pain meds and reassessed  Patient still refused saying she wanted to wait till her mom comes  Mother at bedside at 0 and dressing changed at this time

## 2018-09-24 LAB
ANION GAP SERPL CALCULATED.3IONS-SCNC: 10 MMOL/L (ref 4–13)
BACTERIA BLD CULT: NORMAL
BACTERIA BLD CULT: NORMAL
BASOPHILS # BLD AUTO: 0.01 THOUSANDS/ΜL (ref 0–0.1)
BASOPHILS NFR BLD AUTO: 0 % (ref 0–1)
BUN SERPL-MCNC: 7 MG/DL (ref 5–25)
CALCIUM SERPL-MCNC: 8.7 MG/DL (ref 8.3–10.1)
CHLORIDE SERPL-SCNC: 102 MMOL/L (ref 100–108)
CO2 SERPL-SCNC: 25 MMOL/L (ref 21–32)
CREAT SERPL-MCNC: 0.59 MG/DL (ref 0.6–1.3)
EOSINOPHIL # BLD AUTO: 0.2 THOUSAND/ΜL (ref 0–0.61)
EOSINOPHIL NFR BLD AUTO: 4 % (ref 0–6)
ERYTHROCYTE [DISTWIDTH] IN BLOOD BY AUTOMATED COUNT: 13 % (ref 11.6–15.1)
GFR SERPL CREATININE-BSD FRML MDRD: 150 ML/MIN/1.73SQ M
GLUCOSE SERPL-MCNC: 94 MG/DL (ref 65–140)
HCT VFR BLD AUTO: 37.6 % (ref 34.8–46.1)
HGB BLD-MCNC: 11.9 G/DL (ref 11.5–15.4)
IMM GRANULOCYTES # BLD AUTO: 0.02 THOUSAND/UL (ref 0–0.2)
IMM GRANULOCYTES NFR BLD AUTO: 0 % (ref 0–2)
LYMPHOCYTES # BLD AUTO: 0.89 THOUSANDS/ΜL (ref 0.6–4.47)
LYMPHOCYTES NFR BLD AUTO: 20 % (ref 14–44)
MAGNESIUM SERPL-MCNC: 1.8 MG/DL (ref 1.6–2.6)
MCH RBC QN AUTO: 26.4 PG (ref 26.8–34.3)
MCHC RBC AUTO-ENTMCNC: 31.6 G/DL (ref 31.4–37.4)
MCV RBC AUTO: 83 FL (ref 82–98)
MONOCYTES # BLD AUTO: 0.35 THOUSAND/ΜL (ref 0.17–1.22)
MONOCYTES NFR BLD AUTO: 8 % (ref 4–12)
NEUTROPHILS # BLD AUTO: 3.1 THOUSANDS/ΜL (ref 1.85–7.62)
NEUTS SEG NFR BLD AUTO: 68 % (ref 43–75)
NRBC BLD AUTO-RTO: 0 /100 WBCS
PHOSPHATE SERPL-MCNC: 4 MG/DL (ref 2.7–4.5)
PLATELET # BLD AUTO: 261 THOUSANDS/UL (ref 149–390)
PMV BLD AUTO: 10.9 FL (ref 8.9–12.7)
POTASSIUM SERPL-SCNC: 3.8 MMOL/L (ref 3.5–5.3)
RBC # BLD AUTO: 4.51 MILLION/UL (ref 3.81–5.12)
SODIUM SERPL-SCNC: 137 MMOL/L (ref 136–145)
WBC # BLD AUTO: 4.57 THOUSAND/UL (ref 4.31–10.16)

## 2018-09-24 PROCEDURE — 85025 COMPLETE CBC W/AUTO DIFF WBC: CPT | Performed by: STUDENT IN AN ORGANIZED HEALTH CARE EDUCATION/TRAINING PROGRAM

## 2018-09-24 PROCEDURE — 83735 ASSAY OF MAGNESIUM: CPT | Performed by: STUDENT IN AN ORGANIZED HEALTH CARE EDUCATION/TRAINING PROGRAM

## 2018-09-24 PROCEDURE — 99232 SBSQ HOSP IP/OBS MODERATE 35: CPT | Performed by: FAMILY MEDICINE

## 2018-09-24 PROCEDURE — 80048 BASIC METABOLIC PNL TOTAL CA: CPT | Performed by: STUDENT IN AN ORGANIZED HEALTH CARE EDUCATION/TRAINING PROGRAM

## 2018-09-24 PROCEDURE — 84100 ASSAY OF PHOSPHORUS: CPT | Performed by: STUDENT IN AN ORGANIZED HEALTH CARE EDUCATION/TRAINING PROGRAM

## 2018-09-24 RX ORDER — DIPHENHYDRAMINE HCL 25 MG
25 TABLET ORAL EVERY 6 HOURS PRN
Status: DISCONTINUED | OUTPATIENT
Start: 2018-09-24 | End: 2018-09-25 | Stop reason: HOSPADM

## 2018-09-24 RX ORDER — ACETAMINOPHEN 325 MG/1
650 TABLET ORAL EVERY 6 HOURS SCHEDULED
Status: DISCONTINUED | OUTPATIENT
Start: 2018-09-25 | End: 2018-09-25 | Stop reason: HOSPADM

## 2018-09-24 RX ORDER — DIPHENHYDRAMINE HCL 25 MG
12.5 TABLET ORAL ONCE
Status: COMPLETED | OUTPATIENT
Start: 2018-09-24 | End: 2018-09-24

## 2018-09-24 RX ADMIN — VANCOMYCIN HYDROCHLORIDE 1500 MG: 10 INJECTION, POWDER, LYOPHILIZED, FOR SOLUTION INTRAVENOUS at 06:06

## 2018-09-24 RX ADMIN — ACETAMINOPHEN 650 MG: 325 TABLET, FILM COATED ORAL at 17:17

## 2018-09-24 RX ADMIN — CLOBETASOL PROPIONATE: 0.5 CREAM TOPICAL at 17:18

## 2018-09-24 RX ADMIN — VANCOMYCIN HYDROCHLORIDE 1500 MG: 10 INJECTION, POWDER, LYOPHILIZED, FOR SOLUTION INTRAVENOUS at 12:26

## 2018-09-24 RX ADMIN — ALBUTEROL SULFATE 2 PUFF: 90 AEROSOL, METERED RESPIRATORY (INHALATION) at 21:13

## 2018-09-24 RX ADMIN — CEFEPIME HYDROCHLORIDE 1000 MG: 1 INJECTION, SOLUTION INTRAVENOUS at 17:17

## 2018-09-24 RX ADMIN — VANCOMYCIN HYDROCHLORIDE 1500 MG: 10 INJECTION, POWDER, LYOPHILIZED, FOR SOLUTION INTRAVENOUS at 00:07

## 2018-09-24 RX ADMIN — ACETAMINOPHEN 650 MG: 325 TABLET, FILM COATED ORAL at 10:02

## 2018-09-24 RX ADMIN — CLOBETASOL PROPIONATE: 0.5 CREAM TOPICAL at 12:25

## 2018-09-24 RX ADMIN — NAPROXEN 250 MG: 250 TABLET ORAL at 17:16

## 2018-09-24 RX ADMIN — DIPHENHYDRAMINE HCL 25 MG: 25 TABLET ORAL at 22:10

## 2018-09-24 RX ADMIN — NAPROXEN 250 MG: 250 TABLET ORAL at 10:03

## 2018-09-24 RX ADMIN — CEFEPIME HYDROCHLORIDE 1000 MG: 1 INJECTION, SOLUTION INTRAVENOUS at 23:30

## 2018-09-24 RX ADMIN — ALBUTEROL SULFATE 2 PUFF: 90 AEROSOL, METERED RESPIRATORY (INHALATION) at 10:03

## 2018-09-24 RX ADMIN — DIPHENHYDRAMINE HCL 12.5 MG: 25 TABLET ORAL at 13:01

## 2018-09-24 RX ADMIN — VANCOMYCIN HYDROCHLORIDE 1500 MG: 10 INJECTION, POWDER, LYOPHILIZED, FOR SOLUTION INTRAVENOUS at 23:30

## 2018-09-24 RX ADMIN — VANCOMYCIN HYDROCHLORIDE 1500 MG: 10 INJECTION, POWDER, LYOPHILIZED, FOR SOLUTION INTRAVENOUS at 19:44

## 2018-09-24 RX ADMIN — CEFEPIME HYDROCHLORIDE 1000 MG: 1 INJECTION, SOLUTION INTRAVENOUS at 08:30

## 2018-09-24 RX ADMIN — DIPHENHYDRAMINE HCL 25 MG: 25 TABLET ORAL at 10:02

## 2018-09-24 RX ADMIN — DIPHENHYDRAMINE HCL 25 MG: 25 TABLET ORAL at 17:16

## 2018-09-24 RX ADMIN — ACETAMINOPHEN 650 MG: 325 TABLET, FILM COATED ORAL at 23:23

## 2018-09-24 NOTE — PROGRESS NOTES
Patient requesting for benadryl for the second time tonight  Explained to her that MD had discontinued benadryl and ordered atarax instead prn for itching  Offered patient dose of atarax both times but she declined saying she didn't need it

## 2018-09-24 NOTE — DISCHARGE SUMMARY
[facility] - [facility] 26 [facility]  [facility] MRN: [id number]    [facility] [address] / Bed: [address] [facility] Encounter: [id number]    BRIEF OVERVIEW  Admitting Provider: Navneet Medina MD  Discharge Provider: Denys Roche MD    Discharge To: Home    Outpatient Follow-Up:   Continue follow-up with dermatologist  Appointment with [facility] in 1-2 weeks    Things to address at first follow up visit:   Compliance with medications, dermatology follow up  Labs and results pending at discharge: None    Admission Date: 9/19/2018     Discharge Date: 9/25/18    Primary Discharge Diagnosis   Impetigo    Secondary Discharge Diagnoses    Severe eczema    Asthma    Facial cellulitis    Vitiligo  Consulting Providers   Dermatology  Infectious Disease  [address] STAY    Procedures Performed/Pertinent Test results  No Imaging      HPI  Copy from H&P:   26year old female PMH eczema, vitiligo, and asthma presents with worsening skin infection of face  3 days ago patient developed an itch starting at corner of chin  She unavoidable scratched the area which become more raw and swelling ensued  Tried castor oil and benadryl at home for relief  Redness and crust started to spread in the following days and there was associated draining of clear yellowish fluid  Patient presented to ED on 9/18 yesterday, diagnosed with impetigo and received IV Ancef and discharged with oral Keflex, Bactrim and topical bactroban ointment  Patient reports not filling medication due to cost and returns today with worsening spread to rest of face, around ears and neck  Pain is associated now even with opening her eyes and mouth due to worsening swelling  Denies fevers  Denies rash or wounds of other areas  Patient denies recent hospitalizations  Does not work in health care   Patient endorses in 2015 had same episode of skin condition and was treated with cream without abx resolved after 2 weeks  ED Course: 1L NS bolus, Vancomycin 1 25g IV x1    Hospital Course  Pt presented with with impetigo, facial cellulitis and worsening of severe eczema  She was started on IV Vancomycin and IV Cefepime  IV Benadryl and Atarax were given for itching  Pt was given topical steroid cream to be put on face and neck throughout hospitalization  IV morphine and Naprosyn for pain control  Infectious disease and dermatology were both consulted  Skin lesions grew cultures that were infected with polymicrobial reed  MRSA screen was positive in the nares, but lesions were MSSA  Per dermatology, continue current management with Eucerin moisturizer, and topical Triamcinalone BID with dressing over areas  Recommended by Dr Aruna Mayfield to avoid oral steroid medications due to improvement during treatment, but significant rebound worsening of eczema after treatment course completed  Per ID, pt may be discharged on Bactrim and Ciprofloxacin per cultures  She is to also continue topical Triamcinalone cream on face BID  She is to follow up closely with dermatology and is counseled extensively on not scratching her face  On discharge, pt is afebrile and vital signs are stable  She feels well  Throughout hospital stay, pt expressed that she had severe pain in her underarms from severe eczema  Pt was unable to move her arms which made her deconditioned  This also made it difficult for her to apply the steroid creams and put dressings on her skin at home  PT/OT was consulted who recommended STR  Pt discharged to [facility] [place] admits to seeing dermatologist, Dr Aruna Mayfield, for 7 years, but is unhappy with care  She would like to get a new dermatologist  This is to be done outpatient  Information provided for Dr Liz Henriquez in [place], [place]       Physical Exam at Discharge   /62 (BP Location: Right arm)   Pulse 88   Temp 97 6 °F (36 4 °C) (Oral)   Resp 16   Ht 5' 2" (1 575 m)   Wt 79 5 kg (175 lb 5 7 oz)   SpO2 99%   Breastfeeding? No   BMI 32 07 kg/m²   General appearance: alert and oriented, in no acute distress  Head: Normocephalic, without obvious abnormality, atraumatic  Lungs: clear to auscultation bilaterally  Heart: regular rate and rhythm, S1, S2 normal, no murmur, click, rub or gallop  Abdomen: soft, non-tender; bowel sounds normal; no masses,  no organomegaly  Extremities: extremities normal, warm and well-perfused; no cyanosis, clubbing, or edema  Skin: Eczematous skin lesions throughout face and neck with some swelling  Dressing applied to face and neck which is C/D/I       Medications    * albuterol (2 5 mg/3 mL) 0 083 % nebulizer solution   Take 3 mL by nebulization every 6 (six) hours as needed for wheezing   Refills: 0                    * albuterol 90 mcg/act inhaler   Commonly known as: PROAIR HFA   Inhale 2 puffs 4 (four) times a day   Refills: 0   Doctor's comments: Please consider 90 day supplies to promote better adherence                    benzonatate 100 mg capsule   Commonly known as: TESSALON PERLES   Take 1 capsule (100 mg total) by mouth 3 (three) times a day as needed for cough   Refills: 0                    cephalexin 500 mg capsule   Commonly known as: KEFLEX   Take 1 capsule (500 mg total) by mouth 2 (two) times a day for 10 days   Refills: 0                    ciprofloxacin 500 mg tablet   Commonly known as: CIPRO   Take 1 tablet (500 mg total) by mouth every 12 (twelve) hours for 5 days   Refills: 0                    diphenhydrAMINE 25 mg tablet   Commonly known as: BENADRYL   Take 1 tablet (25 mg total) by mouth every 6 (six) hours as needed for itching   Refills: 0                    DUPIXENT 300 MG/2ML Sosy   Generic drug: Dupilumab   Refills: 0                    halobetasol 0 05 % ointment   Commonly known as: ULTRAVATE   Refills: 0                    medroxyPROGESTERone acetate 150 mg/mL injection   Commonly known as: DEPO-PROVERA SYRINGE Refills: 0                    naproxen 500 mg tablet   Commonly known as: NAPROSYN   Take 0 5 tablets (250 mg total) by mouth 2 (two) times a day with meals   Refills: 0                    sulfamethoxazole-trimethoprim 800-160 mg per tablet   Commonly known as: BACTRIM DS   Take 1 tablet by mouth every 12 (twelve) hours for 5 days   Refills: 0                    triamcinolone 0 1 % cream   Commonly known as: KENALOG   Apply topically 2 (two) times a day   Refills: 0                    white petrolatum-mineral oil cream   Apply topically as needed (eczema)   Refills: 1               Allergies  Allergies   Allergen Reactions    Peanuts [Peanut Oil] Itching     Diet restrictions: none  Activity restrictions: none  Code Status: Level 1 - Full Code    Discharge Condition: stable    Discharge  Statement   I spent 30 minutes discharging the patient  This time was spent on the day of discharge  I had direct contact with the patient on the day of discharge  Additional documentation is required if more than 30 minutes were spent on discharge

## 2018-09-24 NOTE — SOCIAL WORK
Pt asked to speak with CM re: disability  Pt encouraged to speak with her HR department to file for Short Term while she is recovering

## 2018-09-24 NOTE — CASE MANAGEMENT
Continued Stay Review  Date: 9/22/18  Vitals: Blood pressure 120/57, pulse 102, temperature 100 2 °F (37 9 °C), temperature source Oral, resp  rate 18, height 5' 2" (1 575 m), weight 79 5 kg (175 lb 5 7 oz), SpO2 99 %, not currently breastfeeding  ,Body mass index is 32 07 kg/m²  Medications:   Scheduled Meds:   Current Facility-Administered Medications:  acetaminophen 650 mg Oral Q6H PRN Violette Moya MD   albuterol 2 puff Inhalation 4x Daily Cherylann Forester   benzonatate 100 mg Oral TID PRN Cherylann Forester   cefepime 1,000 mg Intravenous Q8H Stepan Boyd MD   clobetasol  Topical BID Roge MD Sara   diphenhydrAMINE 25 mg Oral Q6H PRN Jennifer Harvey DO   naproxen 250 mg Oral BID [facility]   vancomycin 1,500 mg Intravenous Q6H Christin Hernandez MD   white petrolatum-mineral oil  Topical PRN Violette Moya MD   Continuous Infusions:    PRN Meds:   acetaminophen    benzonatate    diphenhydrAMINE    white petrolatum-mineral oil  Abnormal Labs/Diagnostic Results:   Age/Sex: 26 y o  female   Assessment/Plan:   INFECTED ECZEMATOUS RASH NOTED ON FACE & NECK WITH TEMP 100 2  RASH WITH SEROUS WEEPING NOTED, BANDAGES IN PLACE  SKIN CULTURES + MSSA, PSEUDOMONAS PUTIDA & ENTEROBACTER CLOACAE COMPLEX  VANCO CONTINUED, CEFEPIME ADDED  Discharge Plan: TBD  Thank you,  [facility] Utilization Review Department  Phone: [phone]; Fax [phone]  ATTENTION: Please call with any questions or concerns to [phone]  and carefully follow the prompts so that you are directed to the right person  Send all requests for admission clinical reviews, approved or denied determinations and any other requests to fax [phone]   All voicemails are confidential

## 2018-09-24 NOTE — PROGRESS NOTES
[address] [facility] [address] [facility] Practice Progress Note - Job Dooley 26 y o  female MRN: [id number]    Unit/Bed#: 3 [place] 314[address]01 Encounter: [id number]      Assessment/Plan:  * Impetigo   Assessment & Plan    · Pt with MRSA positive in the nares and wound cx growing 1+ Growth of Enterobacter cloacae complex, 1+ Growth of Pseudomonas putida group, 1+ Growth of Staphylococcus aureus  · Blood cultures negative x 2  · Continue IV Vancomycin and Cefepime, per Dr Julissa Ludwig, pt may be discharged on Levaquin and Bactrim based on susceptibilities  · IV Atarax PRN for itching  · Will start Clobetasol cream BID on face and neck   · ID consult  · endorses cellulitis in context with severe atopic dermatitis  · Continue IV Cefepime and Vancomycin  · Patient was to be discharged 9/24, but she was extremely resistant, stating she could not move her arms enough to apply creams and dressings and that there was not enough help at home for her to do so  Patient's grandmother called to speak with medical staff and requested placement in short term rehab as she believes patient would not be able to care for herself at home  Patient also complains of problems arranging transportation to and from medical appointments and pharmacies  · Appreciate continued assistance from social work/care management  · PT/OT consult ordered to evaluate for appropriateness for rehab  Facial cellulitis   Assessment & Plan    · Manage as above  · Monitor and continue to emphasize not scratching face  · Pain control with morphine IV PRN        Severe eczema   Assessment & Plan    · Pt was not compliant with home topical steroid regimen  Switched Triamcinalone to more potent topical Clobetasol cream today which is to be put on face and neck BID     · white petrolatum-mineral oil (EUCERIN,HYDROCERIN) cream prn  · Per Dermatology consult over phone to avoid daily course of PO steroids would have reflex worsening once steroids stopped, may consider Keflex as PO antibiotic on discharge        Asthma   Assessment & Plan    · Patient endorses has not needed to rescue inhaler  No maintenance reported  · Albuterol HFA 90 mcg take 2 puffs QID PRN        Vitiligo   Assessment & Plan    · Stable            Subjective:   Patient seen and examined at bedside this morning  Patient reports having "fevers up to 103" last night, but nursing reports no abnormal vitals  She continues to complain of itching and pain in her face  Discharge was planned for today, but patient was extremely resistant, and patient's grandmother informed medical staff that patient would not be able to care for herself at home, because she is unable to apply creams or change dressings by herself without assistance  Objective:     Vitals: Blood pressure 137/88, pulse 92, temperature 98 °F (36 7 °C), temperature source Oral, resp  rate 18, height 5' 2" (1 575 m), weight 79 5 kg (175 lb 5 7 oz), SpO2 99 %, not currently breastfeeding  ,Body mass index is 32 07 kg/m²  Wt Readings from Last 3 Encounters:   09/19/18 79 5 kg (175 lb 5 7 oz)   09/18/18 78 kg (172 lb)   05/16/18 77 1 kg (170 lb)       Intake/Output Summary (Last 24 hours) at 09/24/18 1741  Last data filed at 09/24/18 1226   Gross per 24 hour   Intake              950 ml   Output                0 ml   Net              950 ml       Physical Exam:   General: A&Ax3, no acute distress  Cardiac: RRR, normal S1/S2, no murmurs/rubs/gallops/clicks  Resp: CTA bilaterally, no wheezes/rales/rhonchi  Abdomen: soft, nontender, nondistended, BS+  Extremities: no cyanosis or edema  PP 2+ bilaterally  Face: Dressing applied to face is clean, dry, intact  Eczematous skin lesions throughout with some swelling       Recent Results (from the past 24 hour(s))   CBC and differential    Collection Time: 09/24/18  6:10 AM   Result Value Ref Range    WBC 4 57 4 31 - 10 16 Thousand/uL    RBC 4 51 3 81 - 5 12 Million/uL    Hemoglobin 11 9 11 5 - 15 4 g/dL    Hematocrit 37 6 34 8 - 46 1 %    MCV 83 82 - 98 fL    MCH 26 4 (L) 26 8 - 34 3 pg    MCHC 31 6 31 4 - 37 4 g/dL    RDW 13 0 11 6 - 15 1 %    MPV 10 9 8 9 - 12 7 fL    Platelets 621 999 - 001 Thousands/uL    nRBC 0 /100 WBCs    Neutrophils Relative 68 43 - 75 %    Immat GRANS % 0 0 - 2 %    Lymphocytes Relative 20 14 - 44 %    Monocytes Relative 8 4 - 12 %    Eosinophils Relative 4 0 - 6 %    Basophils Relative 0 0 - 1 %    Neutrophils Absolute 3 10 1 85 - 7 62 Thousands/µL    Immature Grans Absolute 0 02 0 00 - 0 20 Thousand/uL    Lymphocytes Absolute 0 89 0 60 - 4 47 Thousands/µL    Monocytes Absolute 0 35 0 17 - 1 22 Thousand/µL    Eosinophils Absolute 0 20 0 00 - 0 61 Thousand/µL    Basophils Absolute 0 01 0 00 - 0 10 Thousands/µL   Magnesium    Collection Time: 09/24/18  6:10 AM   Result Value Ref Range    Magnesium 1 8 1 6 - 2 6 mg/dL   Phosphorus    Collection Time: 09/24/18  6:10 AM   Result Value Ref Range    Phosphorus 4 0 2 7 - 4 5 mg/dL   Basic metabolic panel    Collection Time: 09/24/18  6:10 AM   Result Value Ref Range    Sodium 137 136 - 145 mmol/L    Potassium 3 8 3 5 - 5 3 mmol/L    Chloride 102 100 - 108 mmol/L    CO2 25 21 - 32 mmol/L    ANION GAP 10 4 - 13 mmol/L    BUN 7 5 - 25 mg/dL    Creatinine 0 59 (L) 0 60 - 1 30 mg/dL    Glucose 94 65 - 140 mg/dL    Calcium 8 7 8 3 - 10 1 mg/dL    eGFR 150 ml/min/1 73sq m       Current Facility-Administered Medications   Medication Dose Route Frequency Provider Last Rate Last Dose    acetaminophen (TYLENOL) tablet 650 mg  650 mg Oral Q6H PRN Gloria Alejandra MD   650 mg at 09/24/18 1717    albuterol (PROVENTIL HFA,VENTOLIN HFA) inhaler 2 puff  2 puff Inhalation 4x Daily Ulyess Domingo   2 puff at 09/24/18 1003    benzonatate (TESSALON PERLES) capsule 100 mg  100 mg Oral TID PRN Ulyess Domingo        cefepime (MAXIPIME) IVPB (premix) 1,000 mg  1,000 mg Intravenous Q8H Flores Christian  mL/hr at 09/24/18 1717 1,000 mg at 09/24/18 1717    clobetasol (TEMOVATE) 0 05 % cream   Topical BID Melba Fothergill, MD        diphenhydrAMINE (BENADRYL) tablet 25 mg  25 mg Oral Q6H PRN Tiara Siddiqui DO   25 mg at 09/24/18 1716    naproxen (NAPROSYN) tablet 250 mg  250 mg Oral BID With Meals Bean Gregory   250 mg at 09/24/18 1716    vancomycin (VANCOCIN) 1,500 mg in sodium chloride 0 9 % 250 mL IVPB  1,500 mg Intravenous Q6H [address] Erlinda Mendez  7 mL/hr at 09/24/18 1226 1,500 mg at 09/24/18 1226    white petrolatum-mineral oil (EUCERIN,HYDROCERIN) cream   Topical PRN Erlinda Mendez MD           Invasive Devices     Peripheral Intravenous Line            Peripheral IV 09/21/18 Left Antecubital 3 days                Lab, Imaging and other studies: I have personally reviewed pertinent reports      VTE Pharmacologic Prophylaxis: not indicated, patient able to walk, no risk factors  VTE Mechanical Prophylaxis: sequential compression device    Hussain Moses DO

## 2018-09-24 NOTE — CASE MANAGEMENT
Continued Stay Review  Date: 9/23/18  Vitals: Blood pressure 131/79, pulse 99, temperature 98 6 °F (37 °C), temperature source Oral, resp  rate 18, height 5' 2" (1 575 m), weight 79 5 kg (175 lb 5 7 oz), SpO2 99 %, not currently breastfeeding  ,Body mass index is 32 07 kg/m²  Medications:   Scheduled Meds:   Current Facility-Administered Medications:  acetaminophen 650 mg Oral Q6H PRN Lenin Rolon MD   albuterol 2 puff Inhalation 4x Daily Andres Aye   benzonatate 100 mg Oral TID PRN Andres Aye   cefepime 1,000 mg Intravenous Q8H Irene Torres MD   clobetasol  Topical BID Margret Feliz MD   diphenhydrAMINE 25 mg Oral Q6H PRN Libby Bobby,    naproxen 250 mg Oral BID With Meals Andres Aye   vancomycin 1,500 mg Intravenous Q6H Varun Suarez MD   white petrolatum-mineral oil  Topical PRN Lenin Rolon MD   Continuous Infusions:    PRN Meds:   acetaminophen    benzonatate    DiphenhydrAMINE; USED X6/24HRS    white petrolatum-mineral oil  IV MORPHINE X1  Abnormal Labs/Diagnostic Results:   Age/Sex: 26 y o  female   Assessment/Plan:   [address] [place], LOCAL WOUND CARE WITH EUCERIN CREAM, DRESSINGS INTACT  USING BENADRYL FOR ITCHING & IV MORPHINE FOR PAIN CONTROL  DOUBLE IVABT THERAPY PER ID WITH +CULTURES     Discharge Plan: TBD

## 2018-09-24 NOTE — DISCHARGE INSTRUCTIONS
Eczema   AMBULATORY CARE:   Eczema , or atopic dermatitis, is an itchy, red skin rash  You are more likely to have it if your parent or a family member has eczema, asthma, or hay fever  It is a long-term condition that may cause flare-ups for the rest of your life  Common symptoms include the following:   · Patches of dry, red, itchy skin    · Bumps or blisters that crust over or ooze clear fluid    · Areas of skin that are thick, scaly, or hard and leather-like  Seek immediate care for the following symptoms:   · A fever or red streaks going up your arm or leg    · Increased swelling, redness, or warmth on your rash  Contact your healthcare provider if:   · Most of your skin is red, swollen, painful, and covered with scales  · You develop bloody, red, painful crusts  · Your skin blisters and oozes white or yellow pus  · You have questions about your condition or care  Treatment for eczema  is aimed at reducing pain and itching, and adding moisture to your skin  Your symptoms should improve after 3 weeks of treatment  There is no cure for eczema  You may need the following:  · Medicines , such as immunosuppressants, help reduce itching, redness, pain, and swelling  They may be given as a cream or pill  You may also receive antihistamines to reduce itching, or antibiotics if you have a skin infection  · Phototherapy , or ultraviolet light, may help heal your skin  It is also called light therapy  Manage eczema:   · Do not scratch  Pat or press on your skin to relieve itching  Your symptoms will get worse if you scratch  Keep your fingernails short so you do not tear your skin if you do scratch  · Keep your skin moist   Rub lotion, cream, or ointment into your skin right after a bath or shower when your skin is still damp  Ask your healthcare provider what to use and how often to use it  · Take baths or showers  with warm water for 10 minutes or less  Use mild bar soap   Ask your healthcare provider for the best soap for you to use  · Wear cotton clothes  Wear loose-fitting clothes made from cotton or cotton blends  Avoid wool  · Use a humidifier  to add moisture to the air in your home  · Avoid changes in temperature , especially activities that cause you to sweat a lot because this can cause itching  Remove blankets from your bed if you get hot while you sleep  · Avoid allergens, dust, and skin irritants  Do not let pets inside your home  Do not use perfume, fabric softener, or makeup that burns or itches  Follow up with your healthcare provider as directed:  Write down your questions so you remember to ask them during your visits  © 2017 [address] Information is for End User's use only and may not be sold, redistributed or otherwise used for commercial purposes  All illustrations and images included in CareNotes® are the copyrighted property of [employer] , [employer]  or Mac[employer] Larkin  The above information is an  only  It is not intended as medical advice for individual conditions or treatments  Talk to your doctor, nurse or pharmacist before following any medical regimen to see if it is safe and effective for you

## 2018-09-24 NOTE — PROGRESS NOTES
Progress Note - Infectious Disease   Flip Canales 26 y o  female MRN: [id number]  Unit/Bed#: [address] [place] [address] Encounter: [id number]      Subjective/Objective   Subjective: The events since last seen by Dr Dominic Maldonado were noted  Today, patient reports that she has no improvement not a bit  She complains of incessant itching, and much discomfort on the face      Meds/Allergies     Current Facility-Administered Medications:     acetaminophen (TYLENOL) tablet 650 mg, 650 mg, Oral, Q6H PRN, Elidia Bush MD, 650 mg at 09/24/18 1002    albuterol (PROVENTIL HFA,VENTOLIN HFA) inhaler 2 puff, 2 puff, Inhalation, 4x Daily, Ro Hummingbird, 2 puff at 09/24/18 1003    benzonatate (TESSALON PERLES) capsule 100 mg, 100 mg, Oral, TID PRN, Ro Hummingbird    cefepime (MAXIPIME) IVPB (premix) 1,000 mg, 1,000 mg, Intravenous, Q8H, Lorelei Davis MD, Last Rate: 100 mL/hr at 09/24/18 0830, 1,000 mg at 09/24/18 0830    clobetasol (TEMOVATE) 0 05 % cream, , Topical, BID, Jaime Metz MD, 1 application at 75/73/93 1637    diphenhydrAMINE (BENADRYL) tablet 25 mg, 25 mg, Oral, Q6H PRN, Sharyn Delacruz DO, 25 mg at 09/24/18 1002    naproxen (NAPROSYN) tablet 250 mg, 250 mg, Oral, BID With Meals, Ro Hummingbird, 250 mg at 09/24/18 1003    vancomycin (VANCOCIN) 1,500 mg in sodium chloride 0 9 % 250 mL IVPB, 1,500 mg, Intravenous, Q6H [address], Elidia Bush MD, Stopped at 09/24/18 0736    white petrolatum-mineral oil (EUCERIN,HYDROCERIN) cream, , Topical, PRN, Elidia Bush MD  Allergies   Allergen Reactions    Peanuts [Peanut Oil] Itching     all current active meds have been reviewed    discontinued meds:   Medications Discontinued During This Encounter   Medication Reason    vancomycin (VANCOCIN) 1,250 mg in sodium chloride 0 9 % 250 mL IVPB     vancomycin (VANCOCIN) 1,250 mg in sodium chloride 0 9 % 250 mL IVPB     albuterol inhalation solution 2 5 mg     hydrOXYzine HCL (ATARAX) tablet 25 mg     benzocaine (HURRICAINE) 20 % mucosal spray 2 spray     mupirocin (BACTROBAN) 2 % ointment     triamcinolone (KENALOG) 0 1 % ointment     diphenhydrAMINE (BENADRYL) tablet 25 mg     hydrOXYzine HCL (ATARAX) tablet 25 mg     diphenhydrAMINE (BENADRYL) tablet 12 5 mg        Physical Exam: Physical Exam    HR:  [93-95] 93  Resp:  [16-18] 16  BP: (121-143)/(63-80) 121/71  SpO2:  [98 %-100 %] 98 %  Body mass index is 32 07 kg/m²  Weight (last 2 days)     None        Temp (24hrs), Av 1 °F (37 3 °C), Min:98 5 °F (36 9 °C), Max:99 6 °F (37 6 °C)  Current: Temperature: 99 6 °F (37 6 °C)AGE@    Intake/Output Summary (Last 24 hours) at 18 1219  Last data filed at 18 0736   Gross per 24 hour   Intake              550 ml   Output                0 ml   Net              550 ml    Sitting out of bed in chair, reports of much pain and itching  Maximum temperature down from 100 4 degree F on the  to 100 2 degree F on the  and 99 6 degree F today  Facial swelling persists  It seems a little less to me than before  Scaly rash on the trunk and extremities  The lungs are clear  Heart sounds are regular  Abdomen is obese and soft and nontender  The calves are supple            Invasive Devices     Peripheral Intravenous Line            Peripheral IV 18 Left Antecubital 3 days                            Lab, Imaging and other studies:    Recent Results (from the past 96 hour(s))   Vancomycin, trough 30 minutes before 4th dose    Collection Time: 18  9:45 PM   Result Value Ref Range    Vancomycin Tr 3 9 (L) 10 0 - 20 0 ug/mL   Basic metabolic panel    Collection Time: 18  5:22 AM   Result Value Ref Range    Sodium 137 136 - 145 mmol/L    Potassium 3 8 3 5 - 5 3 mmol/L    Chloride 105 100 - 108 mmol/L    CO2 22 21 - 32 mmol/L    ANION GAP 10 4 - 13 mmol/L    BUN 5 5 - 25 mg/dL    Creatinine 0 66 0 60 - 1 30 mg/dL    Glucose 90 65 - 140 mg/dL    Calcium 8 5 8 3 - 10 1 mg/dL    eGFR 145 ml/min/1 73sq m   CBC    Collection Time: 09/21/18  5:22 AM   Result Value Ref Range    WBC 7 88 4 31 - 10 16 Thousand/uL    RBC 4 58 3 81 - 5 12 Million/uL    Hemoglobin 12 1 11 5 - 15 4 g/dL    Hematocrit 38 5 34 8 - 46 1 %    MCV 84 82 - 98 fL    MCH 26 4 (L) 26 8 - 34 3 pg    MCHC 31 4 31 4 - 37 4 g/dL    RDW 13 2 11 6 - 15 1 %    Platelets 670 890 - 327 Thousands/uL    MPV 11 4 8 9 - 12 7 fL   Magnesium    Collection Time: 09/21/18  5:22 AM   Result Value Ref Range    Magnesium 1 6 1 6 - 2 6 mg/dL   Phosphorus    Collection Time: 09/21/18  5:22 AM   Result Value Ref Range    Phosphorus 4 0 2 7 - 4 5 mg/dL   CBC and differential    Collection Time: 09/21/18  5:22 AM   Result Value Ref Range    WBC 7 86 4 31 - 10 16 Thousand/uL    RBC 4 60 3 81 - 5 12 Million/uL    Hemoglobin 12 1 11 5 - 15 4 g/dL    Hematocrit 39 0 34 8 - 46 1 %    MCV 85 82 - 98 fL    MCH 26 3 (L) 26 8 - 34 3 pg    MCHC 31 0 (L) 31 4 - 37 4 g/dL    RDW 13 2 11 6 - 15 1 %    MPV 11 2 8 9 - 12 7 fL    Platelets 810 832 - 787 Thousands/uL    nRBC 0 /100 WBCs   Manual Differential(PHLEBS Do Not Order)    Collection Time: 09/21/18  5:22 AM   Result Value Ref Range    Segmented % 62 43 - 75 %    Bands % 14 (H) 0 - 8 %    Lymphocytes % 11 (L) 14 - 44 %    Monocytes % 4 4 - 12 %    Eosinophils % 9 (H) 0 - 6 %    Basophils % 0 0 - 1 %    Absolute Neutrophils 5 97 1 85 - 7 62 Thousand/uL    Lymphocytes Absolute 0 86 0 60 - 4 47 Thousand/uL    Monocytes Absolute 0 31 0 00 - 1 22 Thousand/uL    Eosinophils Absolute 0 71 (H) 0 00 - 0 40 Thousand/uL    Basophils Absolute 0 00 0 00 - 0 10 Thousand/uL    Total Counted 100     RBC Morphology Normal     Anisocytosis Present     Platelet Estimate Adequate Adequate   Vancomycin, trough Sample should be taken 30 min before 4th dose    Collection Time: 09/21/18  5:51 PM   Result Value Ref Range    Vancomycin Tr 20 9 (HH) 10 0 - 20 0 ug/mL   Basic metabolic panel    Collection Time: 09/22/18  6:21 AM   Result Value Ref Range Sodium 137 136 - 145 mmol/L    Potassium 3 6 3 5 - 5 3 mmol/L    Chloride 105 100 - 108 mmol/L    CO2 25 21 - 32 mmol/L    ANION GAP 7 4 - 13 mmol/L    BUN 5 5 - 25 mg/dL    Creatinine 0 63 0 60 - 1 30 mg/dL    Glucose 90 65 - 140 mg/dL    Calcium 8 8 8 3 - 10 1 mg/dL    eGFR 147 ml/min/1 73sq m   Magnesium    Collection Time: 09/22/18  6:21 AM   Result Value Ref Range    Magnesium 1 8 1 6 - 2 6 mg/dL   Phosphorus    Collection Time: 09/22/18  6:21 AM   Result Value Ref Range    Phosphorus 3 9 2 7 - 4 5 mg/dL   CBC and differential    Collection Time: 09/22/18  6:21 AM   Result Value Ref Range    WBC 8 46 4 31 - 10 16 Thousand/uL    RBC 4 49 3 81 - 5 12 Million/uL    Hemoglobin 11 7 11 5 - 15 4 g/dL    Hematocrit 38 1 34 8 - 46 1 %    MCV 85 82 - 98 fL    MCH 26 1 (L) 26 8 - 34 3 pg    MCHC 30 7 (L) 31 4 - 37 4 g/dL    RDW 13 0 11 6 - 15 1 %    MPV 10 2 8 9 - 12 7 fL    Platelets 224 990 - 496 Thousands/uL    nRBC 0 /100 WBCs   Manual Differential(PHLEBS Do Not Order)    Collection Time: 09/22/18  6:21 AM   Result Value Ref Range    Segmented % 71 43 - 75 %    Bands % 2 0 - 8 %    Lymphocytes % 23 14 - 44 %    Monocytes % 2 (L) 4 - 12 %    Eosinophils % 2 0 - 6 %    Basophils % 0 0 - 1 %    Absolute Neutrophils 6 18 1 85 - 7 62 Thousand/uL    Lymphocytes Absolute 1 95 0 60 - 4 47 Thousand/uL    Monocytes Absolute 0 17 0 00 - 1 22 Thousand/uL    Eosinophils Absolute 0 17 0 00 - 0 40 Thousand/uL    Basophils Absolute 0 00 0 00 - 0 10 Thousand/uL    Total Counted 100     RBC Morphology Normal     Platelet Estimate Adequate Adequate   Basic metabolic panel    Collection Time: 09/23/18  6:28 AM   Result Value Ref Range    Sodium 136 136 - 145 mmol/L    Potassium 3 8 3 5 - 5 3 mmol/L    Chloride 103 100 - 108 mmol/L    CO2 25 21 - 32 mmol/L    ANION GAP 8 4 - 13 mmol/L    BUN 6 5 - 25 mg/dL    Creatinine 0 69 0 60 - 1 30 mg/dL    Glucose 94 65 - 140 mg/dL    Calcium 8 7 8 3 - 10 1 mg/dL    eGFR 143 ml/min/1 73sq m Magnesium    Collection Time: 09/23/18  6:28 AM   Result Value Ref Range    Magnesium 1 9 1 6 - 2 6 mg/dL   Phosphorus    Collection Time: 09/23/18  6:28 AM   Result Value Ref Range    Phosphorus 3 9 2 7 - 4 5 mg/dL   CBC and differential    Collection Time: 09/24/18  6:10 AM   Result Value Ref Range    WBC 4 57 4 31 - 10 16 Thousand/uL    RBC 4 51 3 81 - 5 12 Million/uL    Hemoglobin 11 9 11 5 - 15 4 g/dL    Hematocrit 37 6 34 8 - 46 1 %    MCV 83 82 - 98 fL    MCH 26 4 (L) 26 8 - 34 3 pg    MCHC 31 6 31 4 - 37 4 g/dL    RDW 13 0 11 6 - 15 1 %    MPV 10 9 8 9 - 12 7 fL    Platelets 478 568 - 012 Thousands/uL    nRBC 0 /100 WBCs    Neutrophils Relative 68 43 - 75 %    Immat GRANS % 0 0 - 2 %    Lymphocytes Relative 20 14 - 44 %    Monocytes Relative 8 4 - 12 %    Eosinophils Relative 4 0 - 6 %    Basophils Relative 0 0 - 1 %    Neutrophils Absolute 3 10 1 85 - 7 62 Thousands/µL    Immature Grans Absolute 0 02 0 00 - 0 20 Thousand/uL    Lymphocytes Absolute 0 89 0 60 - 4 47 Thousands/µL    Monocytes Absolute 0 35 0 17 - 1 22 Thousand/µL    Eosinophils Absolute 0 20 0 00 - 0 61 Thousand/µL    Basophils Absolute 0 01 0 00 - 0 10 Thousands/µL   Magnesium    Collection Time: 09/24/18  6:10 AM   Result Value Ref Range    Magnesium 1 8 1 6 - 2 6 mg/dL   Phosphorus    Collection Time: 09/24/18  6:10 AM   Result Value Ref Range    Phosphorus 4 0 2 7 - 4 5 mg/dL   Basic metabolic panel    Collection Time: 09/24/18  6:10 AM   Result Value Ref Range    Sodium 137 136 - 145 mmol/L    Potassium 3 8 3 5 - 5 3 mmol/L    Chloride 102 100 - 108 mmol/L    CO2 25 21 - 32 mmol/L    ANION GAP 10 4 - 13 mmol/L    BUN 7 5 - 25 mg/dL    Creatinine 0 59 (L) 0 60 - 1 30 mg/dL    Glucose 94 65 - 140 mg/dL    Calcium 8 7 8 3 - 10 1 mg/dL    eGFR 150 ml/min/1 73sq m       Results from last 7 days  Lab Units 09/24/18  0610 09/23/18  0628 09/22/18  0621   MAGNESIUM mg/dL 1 8 1 9 1 8       Results from last 7 days  Lab Units 09/24/18  0610 09/23/18  0628 09/22/18  0621   PHOSPHORUS mg/dL 4 0 3 9 3 9       Results from last 7 days  Lab Units 09/19/18  0901   INR  1 06   PTT seconds 29     No results found for: TROPONINT  ABG:No results found for: PHART, TBD6AXT, PO2ART, AQM8RUZ, Y6RHWHCX, BEART, SOURCE        Cultures    Results from last 7 days  Lab Units 09/19/18  1749 09/19/18  0908 09/19/18  0901   BLOOD CULTURE   --  No Growth After 5 Days  No Growth After 5 Days  GRAM STAIN RESULT  No polys seen  No organisms seen  --   --    WOUND CULTURE  1+ Growth of Enterobacter cloacae complex*  1+ Growth of Pseudomonas putida group*  1+ Growth of Staphylococcus aureus*  --   --    MRSA CULTURE ONLY  Methicillin Resistant Staphylococcus aureus isolated*  Please note: This patient requires contact precautions  --   --       No results found  I have personally reviewed pertinent reports  Principal Problem:    Impetigo  Active Problems:    Severe eczema    Asthma    Facial cellulitis    Vitiligo      Assessment/Plan: This is a young woman with severe eczema, with secondary infection, facial cellulitis, polymicrobial infection with MRSA and gram-negative bacilli  Change in topical steroid treatment noted, extensive eczematous dermatitis, may merit continued systemic steroid treatment  There has not been significant change overall, may need further input from dermatologist   Continue current antibacterial treatment  Ciprofloxacin may be less expensive than Levaquin

## 2018-09-25 ENCOUNTER — TRANSITIONAL CARE MANAGEMENT (OUTPATIENT)
Dept: FAMILY MEDICINE CLINIC | Facility: CLINIC | Age: 23
End: 2018-09-25

## 2018-09-25 VITALS
BODY MASS INDEX: 32.27 KG/M2 | HEIGHT: 62 IN | HEART RATE: 85 BPM | TEMPERATURE: 98.6 F | OXYGEN SATURATION: 99 % | WEIGHT: 175.35 LBS | RESPIRATION RATE: 16 BRPM | SYSTOLIC BLOOD PRESSURE: 133 MMHG | DIASTOLIC BLOOD PRESSURE: 86 MMHG

## 2018-09-25 PROBLEM — L01.00 IMPETIGO: Status: RESOLVED | Noted: 2018-09-19 | Resolved: 2018-09-25

## 2018-09-25 LAB
ANION GAP SERPL CALCULATED.3IONS-SCNC: 10 MMOL/L (ref 4–13)
BUN SERPL-MCNC: 8 MG/DL (ref 5–25)
CALCIUM SERPL-MCNC: 9 MG/DL (ref 8.3–10.1)
CHLORIDE SERPL-SCNC: 103 MMOL/L (ref 100–108)
CO2 SERPL-SCNC: 24 MMOL/L (ref 21–32)
CREAT SERPL-MCNC: 0.55 MG/DL (ref 0.6–1.3)
ERYTHROCYTE [DISTWIDTH] IN BLOOD BY AUTOMATED COUNT: 12.7 % (ref 11.6–15.1)
GFR SERPL CREATININE-BSD FRML MDRD: 154 ML/MIN/1.73SQ M
GLUCOSE SERPL-MCNC: 101 MG/DL (ref 65–140)
HCT VFR BLD AUTO: 39 % (ref 34.8–46.1)
HGB BLD-MCNC: 11.8 G/DL (ref 11.5–15.4)
MAGNESIUM SERPL-MCNC: 2.1 MG/DL (ref 1.6–2.6)
MCH RBC QN AUTO: 25.6 PG (ref 26.8–34.3)
MCHC RBC AUTO-ENTMCNC: 30.3 G/DL (ref 31.4–37.4)
MCV RBC AUTO: 85 FL (ref 82–98)
PHOSPHATE SERPL-MCNC: 4.2 MG/DL (ref 2.7–4.5)
PLATELET # BLD AUTO: 253 THOUSANDS/UL (ref 149–390)
PMV BLD AUTO: 10 FL (ref 8.9–12.7)
POTASSIUM SERPL-SCNC: 4.2 MMOL/L (ref 3.5–5.3)
RBC # BLD AUTO: 4.61 MILLION/UL (ref 3.81–5.12)
SODIUM SERPL-SCNC: 137 MMOL/L (ref 136–145)
WBC # BLD AUTO: 3.21 THOUSAND/UL (ref 4.31–10.16)

## 2018-09-25 PROCEDURE — G8979 MOBILITY GOAL STATUS: HCPCS

## 2018-09-25 PROCEDURE — 84100 ASSAY OF PHOSPHORUS: CPT | Performed by: STUDENT IN AN ORGANIZED HEALTH CARE EDUCATION/TRAINING PROGRAM

## 2018-09-25 PROCEDURE — 80048 BASIC METABOLIC PNL TOTAL CA: CPT | Performed by: STUDENT IN AN ORGANIZED HEALTH CARE EDUCATION/TRAINING PROGRAM

## 2018-09-25 PROCEDURE — 97167 OT EVAL HIGH COMPLEX 60 MIN: CPT

## 2018-09-25 PROCEDURE — 85027 COMPLETE CBC AUTOMATED: CPT | Performed by: STUDENT IN AN ORGANIZED HEALTH CARE EDUCATION/TRAINING PROGRAM

## 2018-09-25 PROCEDURE — 99238 HOSP IP/OBS DSCHRG MGMT 30/<: CPT | Performed by: FAMILY MEDICINE

## 2018-09-25 PROCEDURE — 97163 PT EVAL HIGH COMPLEX 45 MIN: CPT

## 2018-09-25 PROCEDURE — G8987 SELF CARE CURRENT STATUS: HCPCS

## 2018-09-25 PROCEDURE — G8978 MOBILITY CURRENT STATUS: HCPCS

## 2018-09-25 PROCEDURE — G8988 SELF CARE GOAL STATUS: HCPCS

## 2018-09-25 PROCEDURE — 83735 ASSAY OF MAGNESIUM: CPT | Performed by: STUDENT IN AN ORGANIZED HEALTH CARE EDUCATION/TRAINING PROGRAM

## 2018-09-25 RX ORDER — SULFAMETHOXAZOLE AND TRIMETHOPRIM 800; 160 MG/1; MG/1
1 TABLET ORAL EVERY 12 HOURS SCHEDULED
Qty: 10 TABLET | Refills: 0 | Status: SHIPPED | OUTPATIENT
Start: 2018-09-25 | End: 2018-09-29 | Stop reason: SDUPTHER

## 2018-09-25 RX ORDER — LANOLIN ALCOHOL/MO/W.PET/CERES
CREAM (GRAM) TOPICAL AS NEEDED
Qty: 454 G | Refills: 1 | Status: SHIPPED | OUTPATIENT
Start: 2018-09-25 | End: 2022-07-18

## 2018-09-25 RX ORDER — TRIAMCINOLONE ACETONIDE 1 MG/G
CREAM TOPICAL 2 TIMES DAILY
Status: DISCONTINUED | OUTPATIENT
Start: 2018-09-25 | End: 2018-09-25 | Stop reason: HOSPADM

## 2018-09-25 RX ORDER — TRIAMCINOLONE ACETONIDE 1 MG/G
CREAM TOPICAL 2 TIMES DAILY
Qty: 30 G | Refills: 0 | Status: SHIPPED | OUTPATIENT
Start: 2018-09-25 | End: 2018-09-29 | Stop reason: SDUPTHER

## 2018-09-25 RX ORDER — DIPHENHYDRAMINE HCL 25 MG
25 TABLET ORAL EVERY 6 HOURS PRN
Qty: 30 TABLET | Refills: 0 | Status: SHIPPED | OUTPATIENT
Start: 2018-09-25 | End: 2018-09-29 | Stop reason: SDUPTHER

## 2018-09-25 RX ORDER — CIPROFLOXACIN 500 MG/1
500 TABLET, FILM COATED ORAL EVERY 12 HOURS SCHEDULED
Qty: 10 TABLET | Refills: 0 | Status: SHIPPED | OUTPATIENT
Start: 2018-09-25 | End: 2018-09-29 | Stop reason: SDUPTHER

## 2018-09-25 RX ADMIN — TRIAMCINOLONE ACETONIDE: 1 CREAM TOPICAL at 17:21

## 2018-09-25 RX ADMIN — CEFEPIME HYDROCHLORIDE 1000 MG: 1 INJECTION, SOLUTION INTRAVENOUS at 08:45

## 2018-09-25 RX ADMIN — ALBUTEROL SULFATE 2 PUFF: 90 AEROSOL, METERED RESPIRATORY (INHALATION) at 12:38

## 2018-09-25 RX ADMIN — VANCOMYCIN HYDROCHLORIDE 1500 MG: 10 INJECTION, POWDER, LYOPHILIZED, FOR SOLUTION INTRAVENOUS at 06:11

## 2018-09-25 RX ADMIN — TRIAMCINOLONE ACETONIDE 1 APPLICATION: 1 CREAM TOPICAL at 12:34

## 2018-09-25 RX ADMIN — VANCOMYCIN HYDROCHLORIDE 1500 MG: 10 INJECTION, POWDER, LYOPHILIZED, FOR SOLUTION INTRAVENOUS at 12:34

## 2018-09-25 RX ADMIN — ALBUTEROL SULFATE 2 PUFF: 90 AEROSOL, METERED RESPIRATORY (INHALATION) at 17:36

## 2018-09-25 RX ADMIN — ACETAMINOPHEN 650 MG: 325 TABLET, FILM COATED ORAL at 06:11

## 2018-09-25 RX ADMIN — ACETAMINOPHEN 650 MG: 325 TABLET, FILM COATED ORAL at 12:34

## 2018-09-25 RX ADMIN — CLOBETASOL PROPIONATE 1 APPLICATION: 0.5 CREAM TOPICAL at 08:49

## 2018-09-25 RX ADMIN — DIPHENHYDRAMINE HCL 25 MG: 25 TABLET ORAL at 06:17

## 2018-09-25 RX ADMIN — NAPROXEN 250 MG: 250 TABLET ORAL at 08:45

## 2018-09-25 NOTE — OCCUPATIONAL THERAPY NOTE
OT EVALUATION     09/25/18 2156   Note Type   Note type Eval only   Restrictions/Precautions   Other Precautions Fall Risk;Pain   Pain Assessment   Pain Assessment 0-10   Pain Score 9   Pain Type Acute pain   Pain Location Face  (B arms, ("under arms"))   Home Living   Type of [facility] One level  (1 MILENA)   Bathroom Shower/Tub Tub/shower unit   [employer] Grab bars in shower   Prior Function   Level of [place] Independent with ADLs and functional mobility   Lives With Alone   Receives Help From Family   ADL Assistance Independent   IADLs Independent   Vocational 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[employer])   Comments pt cares for her 3year old daughter    ADL   Eating Assistance 5  Supervision/Setup   Eating Deficit Increased time to complete  (pt props R arm on bed rail seated to eat with R)   Grooming Assistance 4  Minimal Assistance   UB Bathing Assistance 3  Moderate Assistance   LB Bathing Assistance 4  Minimal Assistance   UB Dressing Assistance 3  Moderate Assistance   LB Dressing Assistance 4  Minimal Assistance   [address]  4  Minimal Assistance   Toileting Deficit Clothing management down;Clothing management up   Transfers   Sit to Stand 5  Supervision   Stand to Sit 5  Supervision   Toilet transfer 5  Supervision   Functional Mobility   Functional Mobility 5  Supervision   Additional Comments 15 feet x 2 to and from bathroom, pt reports dizziness throughout session    Balance   Static Sitting Fair +   Dynamic Sitting Fair   Static Standing Fair   Dynamic Standing Fair   Activity Tolerance   Activity Tolerance Patient limited by pain   Nurse Made Aware yes   RUE Overall AROM   R Shoulder Flexion 0 degrees due to pain/discomfort with skin    Patient props arm on bed rail to eat   R Elbow Flexion WFL, MMT 3+/5   R Mass Grasp WFL, MMT 3+/5   LUE Overall AROM   L Shoulder Flexion 50 degrees, limited by pain, MMT 3-/5   L Elbow Flexion WFL, MMT 3+/5 L Mass Grasp WFL, MMT 3+/5   Cognition   Overall Cognitive Status WFL   Arousal/Participation Cooperative   Attention Within functional limits   Orientation Level Oriented X4   Following Commands Follows all commands and directions without difficulty   Assessment   Limitation Decreased ADL status; Decreased UE ROM; Decreased UE strength;Decreased Safe judgement during ADL;Decreased endurance;Decreased self-care trans;Decreased high-level ADLs  (decreased balance and mobility )   Prognosis Good   Assessment Patient evaluated by Occupational Therapy  Patient admitted with Impetigo  The patients occupational profile, medical and therapy history includes a extensive additional review of physical, cognitive, or psychosocial history related to current functional performance  Comorbidities affecting functional mobility and ADLS include: eczema, vitiligo and asthma  Prior to admission, patient was independent with functional mobility without assistive device, independent with ADLS, independent with IADLS and works full time  The evaluation identifies the following performance deficits: weakness, decreased ROM, impaired balance, decreased endurance, increased fall risk, new onset of impairment of functional mobility, decreased ADLS, decreased IADLS, pain, decreased activity tolerance, decreased safety awareness and decreased strength, that result in activity limitations and/or participation restrictions  This evaluation requires clinical decision making of high complexity, because the patient presents with comorbidites that affect occupational performance and required significant modification of tasks or assistance with consideration of multiple treatment options  The Barthel Index was used as a functional outcome tool presenting with a score of 50, indicating marked limitations of functional mobility and ADLS    Patient will benefit from skilled Occupational Therapy services to address above deficits and facilitate a safe return to prior level of function  Goals   Patient Goals less pain, able to care for self and daughter    STG Time Frame (1-7 days)   Short Term Goal  Goals established to promote patient goal of less pain and able to care for self and daughter:  Patient will increase standing tolerance to 3 minutes during ADL task to decrease assistance level and decrease fall risk; Patient will increase bed mobility to independent in preparation for ADLS and transfers; Patient will increase functional mobility to and from bathroom with no assistive device independently to increase performance with ADLS and to use a toilet; Patient will tolerate 10 minutes of UE ROM/strengthening to increase general activity tolerance and performance in ADLS/IADLS; Patient will improve functional activity tolerance to 10 minutes of sustained functional tasks to increase participation in basic self-care and decrease assistance level;  Patient will increase dynamic standing balance to fair+ to improve postural stability and decrease fall risk during standing ADLS and transfers  LTG Time Frame (8-14 days)   Long Term Goal Goals established to promote patient goal of less pain and able to care for self and daughter:  Patient will increase standing tolerance to 6 minutes during ADL task to decrease assistance level and decrease fall risk;  Patient will tolerate 20 minutes of UE ROM/strengthening to increase general activity tolerance and performance in ADLS/IADLS; Patient will improve functional activity tolerance to 20 minutes of sustained functional tasks to increase participation in basic self-care and decrease assistance level;  Patient will increase dynamic standing balance to good to improve postural stability and decrease fall risk during standing ADLS and transfers       Functional Transfer Goals   Pt Will Perform All Functional Transfers (STG independent )   ADL Goals   Pt Will Perform Eating (STG independent )   Pt Will Perform Grooming (STG supervision LTG independent )   Pt Will Perform Bathing (STG min assist LTG supervision )   Pt Will Perform UE Dressing (STG min assist LTG supervision )   Pt Will Perform LE Dressing (STG supervision LTG Independent )   Pt Will Perform Toileting (STG supervision LTG Independent )   Plan   Treatment Interventions ADL retraining;Functional transfer training;UE strengthening/ROM; Endurance training;Patient/family training;Equipment evaluation/education; Activityengagement; Compensatory technique education   Goal Expiration Date 10/09/18   Treatment Day 0   OT Frequency 3-5x/wk   Recommendation   OT Discharge Recommendation Short Term Rehab   Barthel Index   Feeding 5   Bathing 0   Grooming Score 0   Dressing Score 5   Bladder Score 10   Bowels Score 10   Toilet Use Score 5   Transfers (Bed/Chair) Score 10   Mobility (Level Surface) Score 0   Stairs Score 5   Barthel Index Score 50   Licensure   [place] License Number  Eleazar Soares [address] OTR/L [id number]

## 2018-09-25 NOTE — CASE MANAGEMENT
Continued Stay Review    Date: 9/24/18    Vital Signs: /86 (BP Location: Right arm)   Pulse 85   Temp 98 6 °F (37 °C) (Oral)   Resp 16   Ht 5' 2" (1 575 m)   Wt 79 5 kg (175 lb 5 7 oz)   SpO2 99%   Breastfeeding? No   BMI 32 07 kg/m²     GMF  BENADRYL 25   PT OT   CBC BMP MG PHOS  Medications:   Scheduled Meds:   Current Facility-Administered Medications:  acetaminophen 650 mg Oral Q6H [address], DO    albuterol 2 puff Inhalation 4x Daily Andres Aye    benzonatate 100 mg Oral TID PRN Andres Aye    cefepime 1,000 mg Intravenous Q8H Irene Torres MD Last Rate: 1,000 mg (09/25/18 0845)   diphenhydrAMINE 25 mg Oral Q6H PRN Libby Bobby, DO    naproxen 250 mg Oral BID With Meals Andres Aye    triamcinolone  Topical BID Margret Feliz MD    vancomycin 1,500 mg Intravenous Q6H [facility] Lenin Rolon MD Last Rate: 1,500 mg (09/25/18 9696)   white petrolatum-mineral oil  Topical PRN Lenin Rolon MD      Continuous Infusions:    PRN Meds: benzonatate    diphenhydrAMINE    white petrolatum-mineral oil      Age/Sex: 26 y o  female     PER ID   This is a young woman with severe eczema, with secondary infection, facial cellulitis, polymicrobial infection with MRSA and gram-negative bacilli  Change in topical steroid treatment noted, extensive eczematous dermatitis, may merit continued systemic steroid treatment  There has not been significant change overall, may need further input from dermatologist   Continue current antibacterial treatment  Ciprofloxacin may be less expensive than Levaquin  PER MD  Impetigo   Assessment & Plan     · Pt with MRSA positive in the nares and wound cx growing 1+ Growth of Enterobacter cloacae complex, 1+ Growth of Pseudomonas putida group, 1+ Growth of Staphylococcus aureus  · Blood cultures negative x 2  · Continue IV Vancomycin and Cefepime, per Dr Shweta Juarez, pt may be discharged on Levaquin and Bactrim based on susceptibilities     · IV Atarax PRN for itching  · Will start Clobetasol cream BID on face and neck   · ID consult  ? endorses cellulitis in context with severe atopic dermatitis  ?  Continue IV Cefepime and Vancomycin

## 2018-09-25 NOTE — SOCIAL WORK
SW following to assist with DCP  STR placement plans in place  Pt has been accepted by [facility] and bed available at [facility]  [facility] gave initial 3 days subacute authorization ([id number])  So pt can be transferred to [facility] when ready  Notified Dr Bryn Win of plan and availability  SW will follow to assist with transfer when ordered

## 2018-09-25 NOTE — SOCIAL WORK
SW referral received to assist with DCP  Skilled rehab facility placement is being requested  SW met with pt to discuss plans and facility options  Reviewed list of facilities in [place] and [place] that participate with pt's insurance, [facility] through mother  Per pt she was just approved for Medicaid as a secondary as well  Pt's preference is [facility] to remain in area  But pt did agree to have referrals made to all three facilities in [place] including [place] [id number], [facility] and [facility]  Referrals will be made  SW also discussed need for insurance authorization for placement  Pt has been evaluated by PT and OT so assessments will be submitted to [facility] [place] for rehab approval   Edvin Krause will continue to follow to assist with planning as needed

## 2018-09-25 NOTE — PROGRESS NOTES
[facility] [address] [facility] Practice Progress Note - Tian Causey 26 y o  female MRN: [id number]    Unit/Bed#: [address] [place] [address] Encounter: [id number]      Assessment/Plan:  * Impetigo   Assessment & Plan    · Pt with MRSA positive in the nares and wound cx growing 1+ Growth of Enterobacter cloacae complex, 1+ Growth of Pseudomonas putida group, 1+ Growth of Staphylococcus aureus  · Blood cultures negative x 2  · Continue IV Vancomycin and Cefepime, per ID, pt may be discharged on Ciprofloxacin and Bactrim based on susceptibilities  · IV Benadryl PRN for itching  · Continue Clobetasol cream BID on body and face with wrapping over it  · Patient was to be discharged 9/24, but she was extremely resistant, stating she could not move her arms enough to apply creams and dressings due to eczema in her underarms, and that there was not enough help at home for her to do so  Patient's grandmother called to speak with medical staff and requested placement in short term rehab as she believes patient would not be able to care for herself at home  Patient also complains of problems arranging transportation to and from medical appointments and pharmacies  · Appreciate continued assistance from social work/care management  · PT/OT consult ordered to evaluate for appropriateness for rehab  Facial cellulitis   Assessment & Plan    · Manage as above  · Monitor and continue to emphasize not scratching face  · Pain control Naprosyn        Severe eczema   Assessment & Plan    · Pt was not compliant with home topical steroid regimen  Switched Triamcinalone to more potent topical Clobetasol cream which is to be put on face and neck BID  · white petrolatum-mineral oil (EUCERIN,HYDROCERIN) cream prn  · Per Dermatology consult, pt to avoid systemic PO steroids as this would have reflex worsening once steroids stopped  Feroz Acevedo  Spoke with dermatologist Dr Ramez White over the phone again on 9/24/18 who stated that at this point, other than topical steroids and compliance with them, there is no further management  Pt has been tried on various treatments in the past by him which have not worked  Vitiligo   Assessment & Plan    · Stable        Asthma   Assessment & Plan    · Patient endorses has not needed to rescue inhaler  No maintenance reported  · Albuterol HFA 90 mcg take 2 puffs QID PRN              Subjective:   Pt seen and examined at bedside  This morning reports continued pain in her arms due to eczema in her underarms  Would like PT/OT evaluation and states she is unable to care for herself at home  Objective:     Vitals: Blood pressure 121/62, pulse 88, temperature 97 6 °F (36 4 °C), temperature source Oral, resp  rate 16, height 5' 2" (1 575 m), weight 79 5 kg (175 lb 5 7 oz), SpO2 99 %, not currently breastfeeding  ,Body mass index is 32 07 kg/m²  Wt Readings from Last 3 Encounters:   09/19/18 79 5 kg (175 lb 5 7 oz)   09/18/18 78 kg (172 lb)   05/16/18 77 1 kg (170 lb)       Intake/Output Summary (Last 24 hours) at 09/25/18 0753  Last data filed at 09/24/18 2101   Gross per 24 hour   Intake              600 ml   Output                0 ml   Net              600 ml       Physical Exam:   General: Pt is AAO x 3, not in any acute distress  Cardio: RRR, S1 and S2 +, no murmurs/rubs/gallops/clicks  Resp: CTA b/l, no wheezes/rales/rhonchi  Abdomen: soft, NT/ND, BS+  Extremities: no cyanosis or edema  PP 2+ b/l  Dressing applied to face is clean, dry, intact  There are eczematous skin lesions throughout the body including chest, arms, underarms which are wrapped with dressing so pt does not scratch at them  Dressings are all clean/dry/intact       Recent Results (from the past 24 hour(s))   CBC    Collection Time: 09/25/18  6:31 AM   Result Value Ref Range    WBC 3 21 (L) 4 31 - 10 16 Thousand/uL    RBC 4 61 3 81 - 5 12 Million/uL    Hemoglobin 11 8 11 5 - 15 4 g/dL    Hematocrit 39 0 34 8 - 46 1 %    MCV 85 82 - 98 fL    MCH 25 6 (L) 26 8 - 34 3 pg    MCHC 30 3 (L) 31 4 - 37 4 g/dL    RDW 12 7 11 6 - 15 1 %    Platelets 963 861 - 423 Thousands/uL    MPV 10 0 8 9 - 12 7 fL   Basic metabolic panel    Collection Time: 09/25/18  6:31 AM   Result Value Ref Range    Sodium 137 136 - 145 mmol/L    Potassium 4 2 3 5 - 5 3 mmol/L    Chloride 103 100 - 108 mmol/L    CO2 24 21 - 32 mmol/L    ANION GAP 10 4 - 13 mmol/L    BUN 8 5 - 25 mg/dL    Creatinine 0 55 (L) 0 60 - 1 30 mg/dL    Glucose 101 65 - 140 mg/dL    Calcium 9 0 8 3 - 10 1 mg/dL    eGFR 154 ml/min/1 73sq m   Magnesium    Collection Time: 09/25/18  6:31 AM   Result Value Ref Range    Magnesium 2 1 1 6 - 2 6 mg/dL   Phosphorus    Collection Time: 09/25/18  6:31 AM   Result Value Ref Range    Phosphorus 4 2 2 7 - 4 5 mg/dL       Current Facility-Administered Medications   Medication Dose Route Frequency Provider Last Rate Last Dose    acetaminophen (TYLENOL) tablet 650 mg  650 mg Oral Q6H [address] Cindy Agapito, DO   650 mg at 09/25/18 0611    albuterol (PROVENTIL HFA,VENTOLIN HFA) inhaler 2 puff  2 puff Inhalation 4x Daily Curlene Monkton   2 puff at 09/24/18 2113    benzonatate (TESSALON PERLES) capsule 100 mg  100 mg Oral TID PRN Curlene Monkton        cefepime (MAXIPIME) IVPB (premix) 1,000 mg  1,000 mg Intravenous Q8H [place]  mL/hr at 09/24/18 2330 1,000 mg at 09/24/18 2330    clobetasol (TEMOVATE) 0 05 % cream   Topical BID Carlos Baker MD        diphenhydrAMINE (BENADRYL) tablet 25 mg  25 mg Oral Q6H PRN Sunday Eleazar DO   25 mg at 09/25/18 0617    naproxen (NAPROSYN) tablet 250 mg  250 mg Oral BID With Meals Curlene Monkton   250 mg at 09/24/18 1716    vancomycin (VANCOCIN) 1,500 mg in sodium chloride 0 9 % 250 mL IVPB  1,500 mg Intravenous Q6H [address] Cristino Wisdom  7 mL/hr at 09/25/18 0611 1,500 mg at 09/25/18 5637    white petrolatum-mineral oil (EUCERIN,HYDROCERIN) cream   Topical PRN Cristino Wisdom MD           Invasive Devices     Peripheral Intravenous Line Peripheral IV 09/21/18 Left Antecubital 4 days                Lab, Imaging and other studies: I have personally reviewed pertinent reports      VTE Pharmacologic Prophylaxis: Reason for no pharmacologic prophylaxis not indiciated, patient able to walk, no risk factors  VTE Mechanical Prophylaxis: sequential compression device    Danial Kimble MD

## 2018-09-25 NOTE — SOCIAL WORK
Discharge ordered  Pt will be transferred to [facility] at a subacute level of care  Wheelchair [facility] Bapt[facility] transport scheduled through [facility] (#[id number])  RN [facility]), CCL and pt aware of plan  Attempted to reach mother, Toro Love, but phone was not accepting calls

## 2018-09-25 NOTE — PHYSICAL THERAPY NOTE
PT EVALUATION   09/25/18 1035   Pain Assessment   Pain Assessment 0-10   Pain Score 9  (B "arm pits" and facial areas)   Home Living   Type of [address] [facility] [address] level;Stairs to enter without rails  (one step to enter)   Home Equipment (none)   Additional Comments patient independent without assitive device and caring for 3 yo daughter   Prior Function   Level of [place] Independent with ADLs and functional mobility   Lives With Alone   Receives Help From Family   ADL Assistance Independent   IADLs Independent   Vocational Full time employment   Comments patient independent and 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   Restrictions/Precautions   Other Precautions Contact/isolation; Fall Risk;Pain   General   Additional Pertinent History chart reviewed, patient admitted with impetigo, facial cellulitis  Patient independent prior to admission caring for 3 yo daughter and working full time   now requiring assist for all ADLs and mobility due to pain and limited functional mobility of BUEs and cervical areas as well as gait unsteady at times and slow [place] with pain   Family/Caregiver Present No   Cognition   Overall Cognitive Status WFL   Arousal/Participation Cooperative   Attention Within functional limits   Orientation Level Oriented X4   Following Commands Follows all commands and directions without difficulty   RLE Assessment   RLE Assessment (ROM WFL, strength 4-/5)   LLE Assessment   LLE Assessment (ROM WFL, strength 4-/5)   Coordination   Movements are Fluid and Coordinated 0   Coordination and Movement Description decreased coordination BLEs/trunk areas with transfers and gait    Bed Mobility   Supine to Sit 7  Independent   Additional items Verbal cues  (guarded with inability to use UEs at this time due to pain)   Sit to Supine 5  Supervision   Additional items Verbal cues   Additional Comments guarded movement and transfers with pain in BUEs and facial/cervical areas   Transfers   Sit to Stand 5  Supervision   Additional items Verbal cues  (cuing for posture and body mechanics )   Stand to Sit 5  Supervision   Additional Comments patient with head down, rounded shoulder posturing in sit and stand, cervical ROM limited to 50% B rotations and 25% sidebending due to pain with movement as per patient  Education completed for posture correction and improved body mechanics   Ambulation/Elevation   Gait Assistance 5  Supervision   Additional items Verbal cues; Tactile cues  (tactile cuing for posture correction)   Assistive Device (none)   Distance 40 feet with numerous changes in direction in patient room although with forward head, rounded shoulders and BUEs guarded/pressed against body   Balance   Static Sitting Fair +   Dynamic Sitting Fair   Static Standing Fair   Dynamic Standing Fair   Ambulatory Fair   Activity Tolerance   Activity Tolerance Patient limited by fatigue;Patient limited by pain;Treatment limited secondary to medical complications (Comment)   Medical Staff Made Aware yes,spoke with [facility]   Nurse Made Aware yes   Assessment   Prognosis Good   Problem List Decreased strength;Decreased range of motion;Decreased endurance; Impaired balance;Decreased mobility; Decreased coordination;Decreased cognition;Pain;Decreased skin integrity   Assessment Patient seen for Physical Therapy evaluation  Patient admitted with Impetigo  Comorbidities affecting patient's physical performance include:eczema, vitiligo, and asthma presents with worsening skin infection of face,gait dysfunction,pain in B feet, bronchitis    Personal factors affecting patient at time of initial evaluation include: stairs to enter home, inability to ambulate household distances, inability to navigate community distances, inability to navigate level surfaces without external assistance, inability to perform dynamic tasks in community, limited home support, inability to perform current job functions, inability to perform caregiver support/tasks, inability to perform physical activity, inability to perform ADLS and inability to perform IADLS   Prior to admission, patient was independent with functional mobility without assistive device, independent with ADLS, independent with IADLS, ambulating household distance, ambulating community distances, works full time and home with family assist   Please find objective findings from Physical Therapy assessment regarding body systems outlined above with impairments and limitations including weakness, decreased ROM, impaired balance, decreased endurance, impaired coordination, gait deviations, pain, decreased activity tolerance, decreased functional mobility tolerance, fall risk and decreased skin integrity  The Barthel Index was used as a functional outcome tool presenting with a score of 50 today indicating marked limitations of functional mobility and ADLS  Patient's clinical presentation is currently unstable/unpredictable as seen in patient's presentation of vital sign response, changing level of pain, increased fall risk, new onset of impairment of functional mobility, decreased endurance and new onset of weakness  Pt would benefit from continued Physical Therapy treatment to address deficits as defined above and maximize level of functional mobility  As demonstrated by objective findings, the assigned level of complexity for this evaluation is high     Goals   Patient Goals to have no pain and move again   STG Expiration Date 10/02/18   Short Term Goal #1 transfers and gait independently, improve cervical ROM by 25% all ranges   Short Term Goal #2 gait endurance to functional household distances, strength BLEs 4/5 all to meet patient goal or no pain and improved mobility   LTG Expiration Date 10/09/18   Long Term Goal #1 gait endurance to functional community distances   Long Term Goal #2 normalize cervical ROM and sitting/standing posture   Treatment Day 0   Plan   Treatment/Interventions ADL retraining;Functional transfer training;LE strengthening/ROM; Elevations; Therapeutic exercise; Endurance training;Patient/family training;Equipment eval/education; Bed mobility;Gait training; Compensatory technique education   PT Frequency 5x/wk   Recommendation   Recommendation (STR)   Barthel Index   Feeding 5   Bathing 0   Grooming Score 0   Dressing Score 5   Bladder Score 10   Bowels Score 10   Toilet Use Score 5   Transfers (Bed/Chair) Score 10   Mobility (Level Surface) Score 0   Stairs Score 5   Barthel Index Score 48   Licensure   [place] License Number  [facility] PT [id number]

## 2018-09-25 NOTE — NURSING NOTE
Pt left via wheelchair accompanied by transport team  Discharge instructions given to receiving facility  Non-smoker  Up to date with immunizations  IV dc and intact  No further questions at this time

## 2018-09-25 NOTE — PLAN OF CARE
Problem: DISCHARGE PLANNING - CARE MANAGEMENT  Goal: Discharge to post-acute care or home with appropriate resources  INTERVENTIONS:  - Conduct assessment to determine patient/family and health care team treatment goals, and need for post-acute services based on payer coverage, community resources, and patient preferences, and barriers to discharge  - Address psychosocial, clinical, and financial barriers to discharge as identified in assessment in conjunction with the patient/family and health care team  - Arrange appropriate level of post-acute services according to patient's   needs and preference and payer coverage in collaboration with the physician and health care team  - Communicate with and update the patient/family, physician, and health care team regarding progress on the discharge plan  - Arrange appropriate transportation to post-acute venues     [facility]  Outcome: Progressing  STR referrals will be made to [facility], [facility] and [facility]  Rehab authorization request will be submitted to [facility] once accepting facility is determined

## 2018-09-25 NOTE — PLAN OF CARE
DISCHARGE PLANNING     Discharge to home or other facility with appropriate resources Adequate for Discharge        DISCHARGE PLANNING - CARE MANAGEMENT     Discharge to post-acute care or home with appropriate resources Adequate for Discharge        INFECTION - ADULT     Absence or prevention of progression during hospitalization Adequate for Discharge        Knowledge Deficit     Patient/family/caregiver demonstrates understanding of disease process, treatment plan, medications, and discharge instructions Adequate for Discharge        PAIN - ADULT     Verbalizes/displays adequate comfort level or baseline comfort level Adequate for Discharge        RESPIRATORY - ADULT     Achieves optimal ventilation and oxygenation Adequate for Discharge        SAFETY ADULT     Patient will remain free of falls Adequate for Discharge     Maintain or return to baseline ADL function Adequate for Discharge     Maintain or return mobility status to optimal level Adequate for Discharge

## 2018-09-25 NOTE — [place] UNIVERSAL TRANSFER FORM
[place] UNIVERSAL TRANSFER FORM  (ALL ITEMS MUST BE COMPLETED)    1  TRANSFER FROM: [address] [place] [address] Street: [facility]    2  DATE OF TRANSFER: 9/25/2018                        TIME OF TRANSFER: 15:30-18:30    3  PATIENT NAME: FRANCES Fierro      YOB: 1995                             GENDER: female    4  LANGUAGE:   English    5  PHYSICIAN NAME:  Maxi Krueger MD                   PHONE: [phone]  CODE STATUS: Level 1 - Full Code        Out of Hospital DNR Attached: No    7  :                                      :  Extended Emergency Contact Information  Primary Emergency Contact: Tarah Bear  Address: [address]  Mobile Phone: [phone]  Relation: Mother           Health Care Representative/Proxy:  No           Legal Guardian:  No             NAME OF:           HEALTH CARE REPRESENTATIVE/PROXY:                                         OR           LEGAL GUARDIAN, IF NOT :                                               PHONE:  (Day)           (Night)                        (Cell)    8  REASON FOR TRANSFER: (Must include brief medical history and recent changes in physical function or cognition ) Impetigo            V/S: /86 (BP Location: Right arm)   Pulse 85   Temp 98 6 °F (37 °C) (Oral)   Resp 16   Ht 5' 2" (1 575 m)   Wt 79 5 kg (175 lb 5 7 oz)   SpO2 99%   Breastfeeding? No   BMI 32 07 kg/m²           PAIN: Yes, Rating 0-10, 6 and Site BL/axilla, face, neck    9  PRIMARY DIAGNOSIS: Impetigo      Secondary Diagnosis:         Pacemaker: No      Internal Defib: No          Mental Health Diagnosis (if Applicable):    10  RESTRAINTS: No     11  RESPIRATORY NEEDS: None    12  ISOLATION/PRECAUTION: MRSA    15  ALLERGY: Peanuts [peanut oil]    14  SENSORY:       Vision Good    15   SKIN CONDITION: Yes:  Otherulcers to neck, BL/axilla, face    16  DIET: Regular    17  IV ACCESS: None    18  PERSONAL ITEMS SENT WITH PATIENT: None    19  ATTACHED DOCUMENTS: MUST ATTACH CURRENT MEDICATION INFORMATION Face Sheet, MAR, Medication Reconciliation, Labs, Discharge Summary, PT Note, OT Note and HX/PE    20  AT RISK ALERTS:None        HARM TO: N/A    21  WEIGHT BEARING STATUS:         Left Leg: Full        Right Leg: Full    22  MENTAL STATUS:Alert and Oriented    23  FUNCTION:        Walk: Self        Transfer: Self        Toilet: Self        Feed: Self    24  IMMUNIZATIONS/SCREENING:     Immunization History   Administered Date(s) Administered    Influenza Quadrivalent Preservative Free 3 years and older IM 12/28/2017    Tdap 03/30/2018       25  BOWEL: Continent    26  BLADDER: Continent    27   SENDING FACILITY CONTACT:                   Title:         Unit:         Phone:           [phone] (if known):        Title:        Unit:         Phone:         FORM PREFILLED BY (if applicable)       Title:       Unit:        Phone:         FORM COMPLETED BY Suzanne Ewing RN      Title: PILAR      Phone:  [phone]

## 2018-09-26 NOTE — CASE MANAGEMENT
Auth:    [id number]      Notification of Discharge  This is a Notification of Discharge from our facility [address]  Please be advised that this patient has been discharge from our facility  Below you will find the admission and discharge date and time including the patients disposition  PRESENTATION DATE: 9/19/2018  8:07 AM  IP ADMISSION DATE: 9/19/18 1002  DISCHARGE DATE: 9/25/2018  5:30 PM  DISPOSITION: Non [facility] SNF/TCU/SNU    [address] [facility] in the [facility] Review Department  Phone: [phone]; Fax [phone]  ATTENTION: The Network Utilization Review Department is now centralized for our 9 Facilities  Make a note that we have a new phone and fax numbers for our Department  Please call with any questions or concerns to [phone] and carefully follow the prompts so that you are directed to the right person  All voicemails are confidential  Fax any determinations, approvals, denials, and requests for initial or continue stay review clinical to [phone]  Due to HIGH CALL volume, it would be easier if you could please send faxed requests to expedite your requests and in part, help us provide discharge notifications faster

## 2018-09-29 DIAGNOSIS — J45.909 UNCOMPLICATED ASTHMA, UNSPECIFIED ASTHMA SEVERITY, UNSPECIFIED WHETHER PERSISTENT: ICD-10-CM

## 2018-09-29 DIAGNOSIS — L03.211 FACIAL CELLULITIS: ICD-10-CM

## 2018-09-29 DIAGNOSIS — M79.672 PAIN IN BOTH FEET: ICD-10-CM

## 2018-09-29 DIAGNOSIS — M79.671 PAIN IN BOTH FEET: ICD-10-CM

## 2018-09-29 DIAGNOSIS — L30.9 SEVERE ECZEMA: ICD-10-CM

## 2018-09-29 RX ORDER — DIPHENHYDRAMINE HCL 25 MG
25 TABLET ORAL EVERY 6 HOURS PRN
Qty: 30 TABLET | Refills: 0 | Status: SHIPPED | OUTPATIENT
Start: 2018-09-29 | End: 2022-07-18

## 2018-09-29 RX ORDER — SULFAMETHOXAZOLE AND TRIMETHOPRIM 800; 160 MG/1; MG/1
1 TABLET ORAL EVERY 12 HOURS SCHEDULED
Qty: 4 TABLET | Refills: 0 | Status: SHIPPED | OUTPATIENT
Start: 2018-09-29 | End: 2018-10-04

## 2018-09-29 RX ORDER — CIPROFLOXACIN 500 MG/1
500 TABLET, FILM COATED ORAL EVERY 12 HOURS SCHEDULED
Qty: 4 TABLET | Refills: 0 | Status: SHIPPED | OUTPATIENT
Start: 2018-09-29 | End: 2018-10-04

## 2018-09-29 RX ORDER — NAPROXEN 500 MG/1
250 TABLET ORAL 2 TIMES DAILY WITH MEALS
Qty: 30 TABLET | Refills: 0 | Status: SHIPPED | OUTPATIENT
Start: 2018-09-29 | End: 2018-12-27 | Stop reason: ALTCHOICE

## 2018-09-29 RX ORDER — TRIAMCINOLONE ACETONIDE 1 MG/G
CREAM TOPICAL 2 TIMES DAILY
Qty: 30 G | Refills: 3 | Status: SHIPPED | OUTPATIENT
Start: 2018-09-29 | End: 2018-11-27 | Stop reason: SDUPTHER

## 2018-09-29 RX ORDER — ALBUTEROL SULFATE 90 UG/1
2 AEROSOL, METERED RESPIRATORY (INHALATION) 4 TIMES DAILY
Qty: 18 G | Refills: 3 | Status: SHIPPED | OUTPATIENT
Start: 2018-09-29 | End: 2019-07-26 | Stop reason: SDUPTHER

## 2018-10-17 ENCOUNTER — TELEPHONE (OUTPATIENT)
Dept: FAMILY MEDICINE CLINIC | Facility: CLINIC | Age: 23
End: 2018-10-17

## 2018-11-05 RX ORDER — HALOBETASOL PROPIONATE 0.05 %
OINTMENT (GRAM) TOPICAL
COMMUNITY
Start: 2018-10-11 | End: 2018-11-27 | Stop reason: ALTCHOICE

## 2018-11-27 ENCOUNTER — OFFICE VISIT (OUTPATIENT)
Dept: FAMILY MEDICINE CLINIC | Facility: CLINIC | Age: 23
End: 2018-11-27
Payer: COMMERCIAL

## 2018-11-27 VITALS
DIASTOLIC BLOOD PRESSURE: 80 MMHG | WEIGHT: 154 LBS | HEIGHT: 63 IN | BODY MASS INDEX: 27.29 KG/M2 | TEMPERATURE: 98.5 F | SYSTOLIC BLOOD PRESSURE: 102 MMHG | HEART RATE: 83 BPM | OXYGEN SATURATION: 98 %

## 2018-11-27 DIAGNOSIS — L30.9 ECZEMA, UNSPECIFIED TYPE: Primary | ICD-10-CM

## 2018-11-27 DIAGNOSIS — L30.9 SEVERE ECZEMA: ICD-10-CM

## 2018-11-27 PROCEDURE — 99213 OFFICE O/P EST LOW 20 MIN: CPT | Performed by: FAMILY MEDICINE

## 2018-11-27 RX ORDER — CLOBETASOL PROPIONATE 0.5 MG/G
OINTMENT TOPICAL
COMMUNITY
Start: 2018-11-06 | End: 2018-11-27 | Stop reason: ALTCHOICE

## 2018-11-27 RX ORDER — TRIAMCINOLONE ACETONIDE 1 MG/G
CREAM TOPICAL 2 TIMES DAILY
Qty: 30 G | Refills: 0 | Status: SHIPPED | OUTPATIENT
Start: 2018-11-27 | End: 2022-07-18 | Stop reason: SDUPTHER

## 2018-11-27 NOTE — PROGRESS NOTES
Assessment/Plan:    Severe eczema  - pt has severe eczema, with depigmentation of her skin, likely 2/2 chronic undertreated eczema  - strongly advised her to f/u with derm, gave her referral, she was hesitant in terms of commuting to derm clinic however advised her that she needs to see them for futher specialized care given the severity of her eczema   - no signs of infection at this time, did prescribe her Triamcinolone topical for the eczema as that her worked for her in the past      Subjective:      Patient ID: José Luis Santiago is a 26 y o  female  HPI     2-3 week hx of itching on her body, with post-itching burning pain after she itches, this has been intermittent but overall worsening since past couple weeks now, worse on her lower extremities  her skin is more dry which makes her think this is eczema flare  She has tried naproxen for pain control  No drainage, no open wounds, no erythema, no fevers, no N/V  She is not using anything for her eczema, has not used anything since past couple months, noted some depigmentation of lower extremities  Has not seem derm since couple months now  The following portions of the patient's history were reviewed and updated as appropriate: allergies, current medications, past family history, past medical history, past social history, past surgical history and problem list     Review of Systems   Constitutional: Negative for chills and fever  Respiratory: Negative for shortness of breath and wheezing  Cardiovascular: Negative for chest pain and leg swelling  Gastrointestinal: Negative for abdominal pain, diarrhea, nausea and vomiting  Objective:      /80   Pulse 83   Temp 98 5 °F (36 9 °C)   Ht 5' 3" (1 6 m)   Wt 69 9 kg (154 lb)   SpO2 98%   BMI 27 28 kg/m²          Physical Exam   Constitutional: She appears well-developed and well-nourished  No distress  HENT:   Head: Normocephalic and atraumatic     Right Ear: External ear normal  Left Ear: External ear normal    Eyes: Conjunctivae are normal    Neck: Neck supple  Cardiovascular: Normal rate, regular rhythm, normal heart sounds and intact distal pulses  No murmur heard  Pulmonary/Chest: Effort normal and breath sounds normal  No respiratory distress  She has no wheezes  She has no rales  Abdominal: Soft  Bowel sounds are normal  She exhibits no distension  There is no tenderness  There is no rebound  Musculoskeletal: She exhibits no edema, tenderness or deformity  Neurological: She is alert  Skin: Skin is warm and dry  She is not diaphoretic  Severe eczema over body, dry skin diffusely, depigmented areas with vitiligo on lower extremities and upper extremities - most of it chronic for patient  No extremity swelling, no tongue swelling, no facial edema, no obvious open wounds    Psychiatric: She has a normal mood and affect

## 2018-12-01 NOTE — ASSESSMENT & PLAN NOTE
- pt has severe eczema, with depigmentation of her skin, likely 2/2 chronic undertreated eczema  - strongly advised her to f/u with derm, gave her referral, she was hesitant in terms of commuting to derm clinic however advised her that she needs to see them for futher specialized care given the severity of her eczema   - no signs of infection at this time, did prescribe her Triamcinolone topical for the eczema as that her worked for her in the past

## 2018-12-27 ENCOUNTER — OFFICE VISIT (OUTPATIENT)
Dept: FAMILY MEDICINE CLINIC | Facility: CLINIC | Age: 23
End: 2018-12-27
Payer: COMMERCIAL

## 2018-12-27 VITALS
BODY MASS INDEX: 27.46 KG/M2 | WEIGHT: 155 LBS | OXYGEN SATURATION: 100 % | RESPIRATION RATE: 18 BRPM | SYSTOLIC BLOOD PRESSURE: 122 MMHG | DIASTOLIC BLOOD PRESSURE: 76 MMHG | TEMPERATURE: 98.5 F | HEART RATE: 89 BPM

## 2018-12-27 DIAGNOSIS — J34.89 RHINORRHEA: ICD-10-CM

## 2018-12-27 DIAGNOSIS — J02.9 SORE THROAT: Primary | ICD-10-CM

## 2018-12-27 PROCEDURE — 99213 OFFICE O/P EST LOW 20 MIN: CPT | Performed by: FAMILY MEDICINE

## 2018-12-27 RX ORDER — CLOBETASOL PROPIONATE 0.5 MG/G
OINTMENT TOPICAL
COMMUNITY
Start: 2018-12-26 | End: 2022-07-18

## 2018-12-27 RX ORDER — FLUTICASONE PROPIONATE 50 MCG
1 SPRAY, SUSPENSION (ML) NASAL DAILY
Qty: 1 BOTTLE | Refills: 0 | Status: SHIPPED | OUTPATIENT
Start: 2018-12-27 | End: 2019-08-11

## 2018-12-27 NOTE — PROGRESS NOTES
Assessment/Plan:     Diagnoses and all orders for this visit:    Sore throat  Sent to pharmacy  lidocaine viscous (XYLOCAINE) 2 % mucosal solution; Swish and spit 15 mL 4 (four) times a day as needed for mild pain Gargle and swallow 1 teaspoon ( 5mL) every 2 hours as needed  Continue symptomatic treatment  Encouraged hot tea with lemon and honey  Will continue to monitor for improvement  Rhinorrhea  Sent to pharmacy   fluticasone (FLONASE) 50 mcg/act nasal spray; 1 spray into each nostril daily    RTO if symptoms worsen  Subjective:      Patient ID: Caroline Infante is a 26 y o  female  27 y/o female presents complaining of 1 week of cough, rhinorrhea, congestion and sore throat  Pt describes the feeling of glass shards in her throat  She has tried therapy with theraflu and nyquil with no relief  Eating makes symptoms worse with a feeling as though her "mouth is breaking out"  Has been using proair twice a day  Denies rashes or ear pain  Denies fever, chills, nausea, vomiting, and diarrhea  Denies sick contacts  Had flu vaccine this year  The following portions of the patient's history were reviewed and updated as appropriate: allergies, current medications, past family history, past medical history, past social history, past surgical history and problem list     Review of Systems   Constitutional: Positive for appetite change  Negative for chills and fever  HENT: Positive for rhinorrhea, sore throat and trouble swallowing  Negative for congestion, sinus pain and sinus pressure  Respiratory: Positive for cough  Negative for shortness of breath and wheezing  Cardiovascular: Negative for chest pain and palpitations  Gastrointestinal: Negative for abdominal pain, constipation, diarrhea, nausea and vomiting  Genitourinary: Negative  Musculoskeletal: Negative for back pain and neck pain  Skin: Negative for color change and rash           Objective:      /76 (BP Location: Left arm, Patient Position: Sitting, Cuff Size: Standard)   Pulse 89   Temp 98 5 °F (36 9 °C) (Tympanic)   Resp 18   Wt 70 3 kg (155 lb)   SpO2 100%   BMI 27 46 kg/m²          Physical Exam   Constitutional: She is oriented to person, place, and time  She appears well-developed and well-nourished  No distress  HENT:   Head: Normocephalic and atraumatic  Right Ear: External ear normal    Left Ear: External ear normal    Nose: Nose normal    Mouth/Throat: No oropharyngeal exudate  Mild erythema of oropharynx    Eyes: Conjunctivae are normal  Right eye exhibits no discharge  Left eye exhibits no discharge  No scleral icterus  Neck: Normal range of motion  Neck supple  No thyromegaly present  Cardiovascular: Normal rate, regular rhythm, normal heart sounds and intact distal pulses  Pulmonary/Chest: Effort normal and breath sounds normal  No respiratory distress  She has no wheezes  Abdominal: Soft  Bowel sounds are normal    Neurological: She is alert and oriented to person, place, and time  Skin: Skin is warm and dry  No rash noted  She is not diaphoretic  Psychiatric: She has a normal mood and affect  Her behavior is normal  Judgment and thought content normal    Nursing note and vitals reviewed

## 2019-04-03 ENCOUNTER — HOSPITAL ENCOUNTER (EMERGENCY)
Facility: HOSPITAL | Age: 24
Discharge: HOME | End: 2019-04-04
Attending: EMERGENCY MEDICINE | Admitting: EMERGENCY MEDICINE
Payer: COMMERCIAL

## 2019-04-03 DIAGNOSIS — R11.0 NAUSEA: ICD-10-CM

## 2019-04-03 DIAGNOSIS — E86.0 DEHYDRATION: ICD-10-CM

## 2019-04-03 DIAGNOSIS — R59.1 LYMPHADENOPATHY: Primary | ICD-10-CM

## 2019-04-03 PROCEDURE — 81025 URINE PREGNANCY TEST: CPT | Performed by: EMERGENCY MEDICINE

## 2019-04-03 PROCEDURE — 99283 EMERGENCY DEPT VISIT LOW MDM: CPT

## 2019-04-04 VITALS
RESPIRATION RATE: 18 BRPM | WEIGHT: 152 LBS | OXYGEN SATURATION: 99 % | HEIGHT: 62 IN | BODY MASS INDEX: 27.97 KG/M2 | TEMPERATURE: 98.8 F | HEART RATE: 80 BPM | SYSTOLIC BLOOD PRESSURE: 131 MMHG | DIASTOLIC BLOOD PRESSURE: 77 MMHG

## 2019-04-04 LAB
BILIRUB UR QL STRIP: NEGATIVE
CLARITY UR: CLEAR
COLOR UR: YELLOW
EXT PREG TEST URINE: NEGATIVE
GLUCOSE UR STRIP-MCNC: NEGATIVE MG/DL
HGB UR QL STRIP.AUTO: NEGATIVE
KETONES UR STRIP-MCNC: ABNORMAL MG/DL
LEUKOCYTE ESTERASE UR QL STRIP: NEGATIVE
NITRITE UR QL STRIP: NEGATIVE
PH UR STRIP.AUTO: 5.5 [PH]
PROT UR STRIP-MCNC: NEGATIVE MG/DL
SP GR UR STRIP.AUTO: >=1.03 (ref 1–1.03)
UROBILINOGEN UR QL STRIP.AUTO: 0.2 E.U./DL

## 2019-04-04 PROCEDURE — 81003 URINALYSIS AUTO W/O SCOPE: CPT | Performed by: EMERGENCY MEDICINE

## 2019-04-04 RX ORDER — ONDANSETRON 4 MG/1
4 TABLET, FILM COATED ORAL EVERY 8 HOURS PRN
Qty: 15 TABLET | Refills: 0 | Status: SHIPPED | OUTPATIENT
Start: 2019-04-04 | End: 2020-01-30 | Stop reason: ALTCHOICE

## 2019-04-04 RX ORDER — ONDANSETRON 4 MG/1
4 TABLET, ORALLY DISINTEGRATING ORAL ONCE
Status: COMPLETED | OUTPATIENT
Start: 2019-04-04 | End: 2019-04-04

## 2019-04-04 RX ADMIN — ONDANSETRON 4 MG: 4 TABLET, ORALLY DISINTEGRATING ORAL at 00:16

## 2019-04-17 ENCOUNTER — OFFICE VISIT (OUTPATIENT)
Dept: FAMILY MEDICINE CLINIC | Facility: CLINIC | Age: 24
End: 2019-04-17
Payer: COMMERCIAL

## 2019-04-17 VITALS
HEIGHT: 62 IN | WEIGHT: 152 LBS | BODY MASS INDEX: 27.97 KG/M2 | OXYGEN SATURATION: 99 % | TEMPERATURE: 99 F | DIASTOLIC BLOOD PRESSURE: 60 MMHG | HEART RATE: 89 BPM | SYSTOLIC BLOOD PRESSURE: 96 MMHG

## 2019-04-17 DIAGNOSIS — N63.0 BREAST LUMP IN FEMALE: Primary | ICD-10-CM

## 2019-04-17 DIAGNOSIS — Z11.3 SCREEN FOR STD (SEXUALLY TRANSMITTED DISEASE): ICD-10-CM

## 2019-04-17 DIAGNOSIS — R11.0 NAUSEA: ICD-10-CM

## 2019-04-17 PROBLEM — L03.211 FACIAL CELLULITIS: Status: RESOLVED | Noted: 2018-09-19 | Resolved: 2019-04-17

## 2019-04-17 PROBLEM — J40 BRONCHITIS: Status: RESOLVED | Noted: 2018-05-15 | Resolved: 2019-04-17

## 2019-04-17 LAB — SL AMB POCT URINE HCG: NORMAL

## 2019-04-17 PROCEDURE — 81025 URINE PREGNANCY TEST: CPT | Performed by: FAMILY MEDICINE

## 2019-04-17 PROCEDURE — 3008F BODY MASS INDEX DOCD: CPT | Performed by: FAMILY MEDICINE

## 2019-04-17 PROCEDURE — 99213 OFFICE O/P EST LOW 20 MIN: CPT | Performed by: FAMILY MEDICINE

## 2019-04-19 LAB
C TRACH RRNA SPEC QL NAA+PROBE: NEGATIVE
N GONORRHOEA RRNA SPEC QL NAA+PROBE: NEGATIVE

## 2019-07-13 DIAGNOSIS — J45.909 UNCOMPLICATED ASTHMA, UNSPECIFIED ASTHMA SEVERITY, UNSPECIFIED WHETHER PERSISTENT: ICD-10-CM

## 2019-07-26 DIAGNOSIS — J45.909 UNCOMPLICATED ASTHMA, UNSPECIFIED ASTHMA SEVERITY, UNSPECIFIED WHETHER PERSISTENT: ICD-10-CM

## 2019-07-29 RX ORDER — ALBUTEROL SULFATE 90 UG/1
2 AEROSOL, METERED RESPIRATORY (INHALATION) 4 TIMES DAILY
Qty: 18 G | Refills: 3 | Status: SHIPPED | OUTPATIENT
Start: 2019-07-29 | End: 2019-08-11

## 2019-08-11 ENCOUNTER — HOSPITAL ENCOUNTER (EMERGENCY)
Facility: HOSPITAL | Age: 24
Discharge: HOME | End: 2019-08-11
Attending: EMERGENCY MEDICINE | Admitting: EMERGENCY MEDICINE
Payer: COMMERCIAL

## 2019-08-11 VITALS
BODY MASS INDEX: 27.26 KG/M2 | HEART RATE: 77 BPM | HEIGHT: 62 IN | WEIGHT: 148.15 LBS | TEMPERATURE: 98.4 F | SYSTOLIC BLOOD PRESSURE: 120 MMHG | RESPIRATION RATE: 18 BRPM | DIASTOLIC BLOOD PRESSURE: 73 MMHG | OXYGEN SATURATION: 98 %

## 2019-08-11 DIAGNOSIS — Z32.02 PREGNANCY TEST NEGATIVE: Primary | ICD-10-CM

## 2019-08-11 LAB
EXT PREG TEST URINE: NEGATIVE
EXT. CONTROL ED NAV: NORMAL

## 2019-08-11 PROCEDURE — 81025 URINE PREGNANCY TEST: CPT | Performed by: EMERGENCY MEDICINE

## 2019-08-11 PROCEDURE — 99282 EMERGENCY DEPT VISIT SF MDM: CPT

## 2019-08-11 NOTE — ED PROVIDER NOTES
History  Chief Complaint   Patient presents with    Pregnancy Test     states period is 1 week late  Patient is here because she brought her child in for evaluation for possible reaction after vaccine  She figured while she was here she would mention that she is 1 week late for her menstrual cycle and is requesting pregnancy test   Patient is having no abdominal pain no vaginal bleeding or discharge  No fever          Prior to Admission Medications   Prescriptions Last Dose Informant Patient Reported? Taking? PROAIR  (90 Base) MCG/ACT inhaler   No Yes   Sig: INHALE 2 PUFFS BY MOUTH 4 TIMES DAILY   albuterol (2 5 mg/3 mL) 0 083 % nebulizer solution   No Yes   Sig: Take 3 mL by nebulization every 6 (six) hours as needed for wheezing   clobetasol (TEMOVATE) 0 05 % ointment   Yes Yes   diphenhydrAMINE (BENADRYL) 25 mg tablet   No Yes   Sig: Take 1 tablet (25 mg total) by mouth every 6 (six) hours as needed for itching   ondansetron (ZOFRAN) 4 mg tablet   No Yes   Sig: Take 1 tablet (4 mg total) by mouth every 8 (eight) hours as needed for nausea or vomiting for up to 15 doses   triamcinolone (KENALOG) 0 1 % cream   No Yes   Sig: Apply topically 2 (two) times a day   white petrolatum-mineral oil (EUCERIN,HYDROCERIN) cream   No Yes   Sig: Apply topically as needed (eczema)      Facility-Administered Medications: None       Past Medical History:   Diagnosis Date    Asthma     Eczema     Vitiligo        Past Surgical History:   Procedure Laterality Date     SECTION             Family History   Problem Relation Age of Onset    Eczema Mother     Eczema Maternal Grandfather     Asthma Maternal Grandfather      I have reviewed and agree with the history as documented  Social History     Tobacco Use    Smoking status: Former Smoker     Packs/day: 0 00    Smokeless tobacco: Never Used   Substance Use Topics    Alcohol use: Yes     Comment: occasional    Drug use:  Yes Frequency: 1 0 times per week     Types: Marijuana        Review of Systems   Genitourinary: Positive for menstrual problem  Negative for vaginal bleeding and vaginal discharge  All other systems reviewed and are negative  Physical Exam  Physical Exam   Constitutional: She is oriented to person, place, and time  She appears well-developed and well-nourished  HENT:   Head: Normocephalic and atraumatic  Eyes: Conjunctivae are normal    Neck: Normal range of motion  Cardiovascular: Normal rate, regular rhythm and normal heart sounds  Pulmonary/Chest: Effort normal    Abdominal: Soft  Bowel sounds are normal  There is no tenderness  Musculoskeletal: Normal range of motion  She exhibits no edema  Neurological: She is alert and oriented to person, place, and time  Skin: Rash noted  Psychiatric: She has a normal mood and affect  Her behavior is normal    Vitals reviewed        Vital Signs  ED Triage Vitals [08/11/19 0820]   Temperature Pulse Respirations Blood Pressure SpO2   98 4 °F (36 9 °C) 77 18 119/62 98 %      Temp Source Heart Rate Source Patient Position - Orthostatic VS BP Location FiO2 (%)   Tympanic Monitor Sitting Left arm --      Pain Score       --           Vitals:    08/11/19 0820 08/11/19 0821   BP: 119/62 120/73   Pulse: 77    Patient Position - Orthostatic VS: Sitting          Visual Acuity      ED Medications  Medications - No data to display    Diagnostic Studies  Results Reviewed     Procedure Component Value Units Date/Time    POCT pregnancy, urine [[id number]]  (Normal) Resulted:  08/11/19 0838    Lab Status:  Final result Updated:  08/11/19 0838     EXT PREG TEST UR (Ref: Negative) negative     Control vaild                 No orders to display              Procedures  Procedures       ED Course                               MDM    Disposition  Final diagnoses:   Pregnancy test negative     Time reflects when diagnosis was documented in both MDM as applicable and the Disposition within this note     Time User Action Codes Description Comment    8/11/2019  8:46 AM Pham Cruz Add [Z32 02] Pregnancy test negative       ED Disposition     ED Disposition Condition Date/Time Comment    Discharge Stable Sun Aug 11, 2019  8:46 AM Genna Samsonpe discharge to home/self care  Follow-up Information     Follow up With Specialties Details Why Contact Char Montano DO Family Medicine Schedule an appointment as soon as possible for a visit  As needed [address]   [phone]            Discharge Medication List as of 8/11/2019  8:47 AM      CONTINUE these medications which have NOT CHANGED    Details   albuterol (2 5 mg/3 mL) 0 083 % nebulizer solution Take 3 mL by nebulization every 6 (six) hours as needed for wheezing, Starting Thu 10/5/2017, Print      clobetasol (TEMOVATE) 0 05 % ointment Starting Wed 12/26/2018, Historical Med      diphenhydrAMINE (BENADRYL) 25 mg tablet Take 1 tablet (25 mg total) by mouth every 6 (six) hours as needed for itching, Starting Sat 9/29/2018, Normal      ondansetron (ZOFRAN) 4 mg tablet Take 1 tablet (4 mg total) by mouth every 8 (eight) hours as needed for nausea or vomiting for up to 15 doses, Starting Thu 4/4/2019, Print      PROAIR  (90 Base) MCG/ACT inhaler INHALE 2 PUFFS BY MOUTH 4 TIMES DAILY, Normal      triamcinolone (KENALOG) 0 1 % cream Apply topically 2 (two) times a day, Starting Tue 11/27/2018, Normal      white petrolatum-mineral oil (EUCERIN,HYDROCERIN) cream Apply topically as needed (eczema), Starting Tue 9/25/2018, Normal           No discharge procedures on file      ED Provider  Electronically Signed by           Wing Crystal MD  08/13/19 2244

## 2019-11-18 ENCOUNTER — HOSPITAL ENCOUNTER (EMERGENCY)
Facility: HOSPITAL | Age: 24
Discharge: HOME | End: 2019-11-18
Attending: EMERGENCY MEDICINE | Admitting: EMERGENCY MEDICINE
Payer: COMMERCIAL

## 2019-11-18 VITALS
TEMPERATURE: 97.3 F | BODY MASS INDEX: 30.89 KG/M2 | WEIGHT: 168.87 LBS | RESPIRATION RATE: 18 BRPM | OXYGEN SATURATION: 100 % | SYSTOLIC BLOOD PRESSURE: 135 MMHG | DIASTOLIC BLOOD PRESSURE: 74 MMHG | HEART RATE: 62 BPM

## 2019-11-18 DIAGNOSIS — F41.9 ANXIETY: Primary | ICD-10-CM

## 2019-11-18 PROCEDURE — 99284 EMERGENCY DEPT VISIT MOD MDM: CPT
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
## 2019-12-28 ENCOUNTER — APPOINTMENT (EMERGENCY)
Dept: RADIOLOGY | Facility: HOSPITAL | Age: 24
End: 2019-12-28
Payer: COMMERCIAL

## 2019-12-28 ENCOUNTER — HOSPITAL ENCOUNTER (EMERGENCY)
Facility: HOSPITAL | Age: 24
Discharge: HOME | End: 2019-12-28
Attending: EMERGENCY MEDICINE | Admitting: EMERGENCY MEDICINE
Payer: COMMERCIAL

## 2019-12-28 VITALS
RESPIRATION RATE: 18 BRPM | OXYGEN SATURATION: 97 % | SYSTOLIC BLOOD PRESSURE: 124 MMHG | TEMPERATURE: 98.1 F | HEART RATE: 72 BPM | BODY MASS INDEX: 26.89 KG/M2 | DIASTOLIC BLOOD PRESSURE: 72 MMHG | WEIGHT: 147 LBS

## 2019-12-28 DIAGNOSIS — J11.1 INFLUENZA: Primary | ICD-10-CM

## 2019-12-28 LAB
ALBUMIN SERPL BCP-MCNC: 3.9 G/DL (ref 3.5–5)
ALP SERPL-CCNC: 50 U/L (ref 46–116)
ALT SERPL W P-5'-P-CCNC: 17 U/L (ref 12–78)
ANION GAP SERPL CALCULATED.3IONS-SCNC: 7 MMOL/L (ref 4–13)
AST SERPL W P-5'-P-CCNC: 21 U/L (ref 5–45)
BASOPHILS # BLD AUTO: 0.01 THOUSANDS/ΜL (ref 0–0.1)
BASOPHILS NFR BLD AUTO: 0 % (ref 0–1)
BILIRUB SERPL-MCNC: 0.6 MG/DL (ref 0.2–1)
BILIRUB UR QL STRIP: NEGATIVE
BUN SERPL-MCNC: 5 MG/DL (ref 5–25)
CALCIUM SERPL-MCNC: 8.7 MG/DL (ref 8.3–10.1)
CHLORIDE SERPL-SCNC: 102 MMOL/L (ref 100–108)
CLARITY UR: CLEAR
CO2 SERPL-SCNC: 29 MMOL/L (ref 21–32)
COLOR UR: YELLOW
CREAT SERPL-MCNC: 0.71 MG/DL (ref 0.6–1.3)
EOSINOPHIL # BLD AUTO: 0.1 THOUSAND/ΜL (ref 0–0.61)
EOSINOPHIL NFR BLD AUTO: 3 % (ref 0–6)
ERYTHROCYTE [DISTWIDTH] IN BLOOD BY AUTOMATED COUNT: 12.9 % (ref 11.6–15.1)
EXT PREG TEST URINE: NORMAL
EXT. CONTROL ED NAV: NORMAL
FLUAV RNA NPH QL NAA+PROBE: ABNORMAL
FLUBV RNA NPH QL NAA+PROBE: DETECTED
GFR SERPL CREATININE-BSD FRML MDRD: 139 ML/MIN/1.73SQ M
GLUCOSE SERPL-MCNC: 86 MG/DL (ref 65–140)
GLUCOSE UR STRIP-MCNC: NEGATIVE MG/DL
HCT VFR BLD AUTO: 42.1 % (ref 34.8–46.1)
HGB BLD-MCNC: 13.4 G/DL (ref 11.5–15.4)
HGB UR QL STRIP.AUTO: NEGATIVE
IMM GRANULOCYTES # BLD AUTO: 0 THOUSAND/UL (ref 0–0.2)
IMM GRANULOCYTES NFR BLD AUTO: 0 % (ref 0–2)
KETONES UR STRIP-MCNC: ABNORMAL MG/DL
LEUKOCYTE ESTERASE UR QL STRIP: NEGATIVE
LYMPHOCYTES # BLD AUTO: 1.12 THOUSANDS/ΜL (ref 0.6–4.47)
LYMPHOCYTES NFR BLD AUTO: 28 % (ref 14–44)
MCH RBC QN AUTO: 26.6 PG (ref 26.8–34.3)
MCHC RBC AUTO-ENTMCNC: 31.8 G/DL (ref 31.4–37.4)
MCV RBC AUTO: 84 FL (ref 82–98)
MONOCYTES # BLD AUTO: 0.35 THOUSAND/ΜL (ref 0.17–1.22)
MONOCYTES NFR BLD AUTO: 9 % (ref 4–12)
NEUTROPHILS # BLD AUTO: 2.5 THOUSANDS/ΜL (ref 1.85–7.62)
NEUTS SEG NFR BLD AUTO: 60 % (ref 43–75)
NITRITE UR QL STRIP: NEGATIVE
NRBC BLD AUTO-RTO: 0 /100 WBCS
PH UR STRIP.AUTO: 6 [PH]
PLATELET # BLD AUTO: 174 THOUSANDS/UL (ref 149–390)
PMV BLD AUTO: 10.8 FL (ref 8.9–12.7)
POTASSIUM SERPL-SCNC: 3.6 MMOL/L (ref 3.5–5.3)
PROT SERPL-MCNC: 8.1 G/DL (ref 6.4–8.2)
PROT UR STRIP-MCNC: NEGATIVE MG/DL
RBC # BLD AUTO: 5.04 MILLION/UL (ref 3.81–5.12)
RSV RNA NPH QL NAA+PROBE: ABNORMAL
SODIUM SERPL-SCNC: 138 MMOL/L (ref 136–145)
SP GR UR STRIP.AUTO: 1.01 (ref 1–1.03)
UROBILINOGEN UR QL STRIP.AUTO: 4 E.U./DL
WBC # BLD AUTO: 4.08 THOUSAND/UL (ref 4.31–10.16)

## 2019-12-28 PROCEDURE — 96374 THER/PROPH/DIAG INJ IV PUSH: CPT

## 2019-12-28 PROCEDURE — 81003 URINALYSIS AUTO W/O SCOPE: CPT | Performed by: EMERGENCY MEDICINE

## 2019-12-28 PROCEDURE — 99284 EMERGENCY DEPT VISIT MOD MDM: CPT

## 2019-12-28 PROCEDURE — 96361 HYDRATE IV INFUSION ADD-ON: CPT

## 2019-12-28 PROCEDURE — 36415 COLL VENOUS BLD VENIPUNCTURE: CPT | Performed by: EMERGENCY MEDICINE

## 2019-12-28 PROCEDURE — 71045 X-RAY EXAM CHEST 1 VIEW: CPT

## 2019-12-28 PROCEDURE — 81025 URINE PREGNANCY TEST: CPT | Performed by: EMERGENCY MEDICINE

## 2019-12-28 PROCEDURE — 87631 RESP VIRUS 3-5 TARGETS: CPT | Performed by: EMERGENCY MEDICINE

## 2019-12-28 PROCEDURE — 85025 COMPLETE CBC W/AUTO DIFF WBC: CPT | Performed by: EMERGENCY MEDICINE

## 2019-12-28 PROCEDURE — 80053 COMPREHEN METABOLIC PANEL: CPT | Performed by: EMERGENCY MEDICINE

## 2019-12-28 RX ORDER — ONDANSETRON 2 MG/ML
4 INJECTION INTRAMUSCULAR; INTRAVENOUS ONCE
Status: COMPLETED | OUTPATIENT
Start: 2019-12-28 | End: 2019-12-28

## 2019-12-28 RX ADMIN — SODIUM CHLORIDE 1000 ML: 0.9 INJECTION, SOLUTION INTRAVENOUS at 16:20

## 2019-12-28 RX ADMIN — ONDANSETRON 4 MG: 2 INJECTION INTRAMUSCULAR; INTRAVENOUS at 16:30

## 2019-12-28 NOTE — ED PROVIDER NOTES
History  Chief Complaint   Patient presents with    Dizziness     states c/o dizziness, cough, generalized aches and pains since xmas    Blurred Vision     intermittent blurred vision today     21 yo female c/o cough, congestion, weakness and dizziness for the last 3-4 days, just not getting better  No vomiting but no appetite  + body aches  Did have some diarrhea yesterday  [facility] 12/7  No urinary symptoms  History provided by:  Patient   used: No    Dizziness   Associated symptoms: nausea and weakness    Associated symptoms: no chest pain, no diarrhea, no headaches, no shortness of breath and no vomiting        Prior to Admission Medications   Prescriptions Last Dose Informant Patient Reported? Taking?    PROAIR  (90 Base) MCG/ACT inhaler   No No   Sig: INHALE 2 PUFFS BY MOUTH 4 TIMES DAILY   albuterol (2 5 mg/3 mL) 0 083 % nebulizer solution   No No   Sig: Take 3 mL by nebulization every 6 (six) hours as needed for wheezing   clobetasol (TEMOVATE) 0 05 % ointment Not Taking at Unknown time  Yes No   diphenhydrAMINE (BENADRYL) 25 mg tablet   No No   Sig: Take 1 tablet (25 mg total) by mouth every 6 (six) hours as needed for itching   ondansetron (ZOFRAN) 4 mg tablet Not Taking at Unknown time  No No   Sig: Take 1 tablet (4 mg total) by mouth every 8 (eight) hours as needed for nausea or vomiting for up to 15 doses   Patient not taking: Reported on 12/28/2019   triamcinolone (KENALOG) 0 1 % cream Not Taking at Unknown time  No No   Sig: Apply topically 2 (two) times a day   Patient not taking: Reported on 12/28/2019   white petrolatum-mineral oil (EUCERIN,HYDROCERIN) cream Not Taking at Unknown time  No No   Sig: Apply topically as needed (eczema)   Patient not taking: Reported on 12/28/2019      Facility-Administered Medications: None       Past Medical History:   Diagnosis Date    Asthma     Eczema     Vitiligo        Past Surgical History:   Procedure Laterality Date     SECTION             Family History   Problem Relation Age of Onset    Eczema Mother     Eczema Maternal Grandfather     Asthma Maternal Grandfather      I have reviewed and agree with the history as documented  Social History     Tobacco Use    Smoking status: Former Smoker     Packs/day: 0 00    Smokeless tobacco: Never Used   Substance Use Topics    Alcohol use: Yes     Comment: occasional    Drug use: Yes     Frequency: 1 0 times per week     Types: Marijuana        Review of Systems   Constitutional: Positive for appetite change  Negative for fever  HENT: Positive for congestion  Eyes: Negative  Respiratory: Positive for cough  Negative for shortness of breath  Cardiovascular: Negative  Negative for chest pain  Gastrointestinal: Positive for nausea  Negative for abdominal pain, diarrhea and vomiting  Genitourinary: Negative  Negative for dysuria and flank pain  Musculoskeletal: Negative  Negative for back pain and myalgias  Skin: Negative  Negative for rash and wound  Neurological: Positive for dizziness and weakness  Negative for headaches  Hematological: Does not bruise/bleed easily  Psychiatric/Behavioral: Negative  All other systems reviewed and are negative  Physical Exam  Physical Exam   Constitutional: She is oriented to person, place, and time  She appears well-developed and well-nourished  No distress  HENT:   Head: Normocephalic and atraumatic  Right Ear: Tympanic membrane normal    Left Ear: Tympanic membrane normal    Nose: Rhinorrhea present  Mouth/Throat: Oropharynx is clear and moist    Eyes: Conjunctivae are normal    Neck: Normal range of motion  Neck supple  Cardiovascular: Normal rate, regular rhythm and normal heart sounds  No murmur heard  Pulmonary/Chest: Effort normal and breath sounds normal  No respiratory distress  Abdominal: Soft  Bowel sounds are normal  She exhibits no distension   There is no tenderness  Musculoskeletal: Normal range of motion  She exhibits no edema or deformity  Neurological: She is alert and oriented to person, place, and time  No cranial nerve deficit  She exhibits normal muscle tone  Skin: Skin is warm and dry  No rash noted  She is not diaphoretic  No pallor  vitiligo   Psychiatric: She has a normal mood and affect  Her behavior is normal    Nursing note and vitals reviewed        Vital Signs  ED Triage Vitals [12/28/19 1421]   Temperature Pulse Respirations Blood Pressure SpO2   98 1 °F (36 7 °C) 86 20 126/76 100 %      Temp Source Heart Rate Source Patient Position - Orthostatic VS BP Location FiO2 (%)   Tympanic Monitor Sitting Right arm --      Pain Score       7           Vitals:    12/28/19 1421   BP: 126/76   Pulse: 86   Patient Position - Orthostatic VS: Sitting         Visual Acuity  Visual Acuity      Most Recent Value   Visual acuity R eye is  20/30   Visual acuity Left eye is  20/25          ED Medications  Medications   sodium chloride 0 9 % bolus 1,000 mL (1,000 mL Intravenous New Bag 12/28/19 1620)   ondansetron (ZOFRAN) injection 4 mg (4 mg Intravenous Given 12/28/19 1630)       Diagnostic Studies  Results Reviewed     Procedure Component Value Units Date/Time    Influenza A/B and RSV PCR [[id number]]  (Abnormal) Collected:  12/28/19 1604    Lab Status:  Final result Specimen:  Nasopharyngeal Swab Updated:  12/28/19 1655     INFLUENZA A PCR None Detected     INFLUENZA B PCR Detected     RSV PCR None Detected    POCT pregnancy, urine [[id number]]  (Normal) Resulted:  12/28/19 1651    Lab Status:  Final result Updated:  12/28/19 1651     EXT PREG TEST UR (Ref: Negative) neg     Control valid    Comprehensive metabolic panel [[id number]] Collected:  12/28/19 1619    Lab Status:  Final result Specimen:  Blood from Arm, Right Updated:  12/28/19 1643     Sodium 138 mmol/L      Potassium 3 6 mmol/L      Chloride 102 mmol/L      CO2 29 mmol/L      ANION GAP 7 mmol/L BUN 5 mg/dL      Creatinine 0 71 mg/dL      Glucose 86 mg/dL      Calcium 8 7 mg/dL      AST 21 U/L      ALT 17 U/L      Alkaline Phosphatase 50 U/L      Total Protein 8 1 g/dL      Albumin 3 9 g/dL      Total Bilirubin 0 60 mg/dL      eGFR 139 ml/min/1 73sq m     Narrative:       [place] guidelines for Chronic Kidney Disease (CKD):     Stage 1 with normal or high GFR (GFR > 90 mL/min/1 73 square meters)    Stage 2 Mild CKD (GFR = 60-89 mL/min/1 73 square meters)    Stage 3A Moderate CKD (GFR = 45-59 mL/min/1 73 square meters)    Stage 3B Moderate CKD (GFR = 30-44 mL/min/1 73 square meters)    Stage 4 Severe CKD (GFR = 15-29 mL/min/1 73 square meters)    Stage 5 End Stage CKD (GFR <15 mL/min/1 73 square meters)  Note: GFR calculation is accurate only with a steady state creatinine    CBC and differential [[id number]]  (Abnormal) Collected:  12/28/19 1619    Lab Status:  Final result Specimen:  Blood from Arm, Right Updated:  12/28/19 1626     WBC 4 08 Thousand/uL      RBC 5 04 Million/uL      Hemoglobin 13 4 g/dL      Hematocrit 42 1 %      MCV 84 fL      MCH 26 6 pg      MCHC 31 8 g/dL      RDW 12 9 %      MPV 10 8 fL      Platelets 996 Thousands/uL      nRBC 0 /100 WBCs      Neutrophils Relative 60 %      Immat GRANS % 0 %      Lymphocytes Relative 28 %      Monocytes Relative 9 %      Eosinophils Relative 3 %      Basophils Relative 0 %      Neutrophils Absolute 2 50 Thousands/µL      Immature Grans Absolute 0 00 Thousand/uL      Lymphocytes Absolute 1 12 Thousands/µL      Monocytes Absolute 0 35 Thousand/µL      Eosinophils Absolute 0 10 Thousand/µL      Basophils Absolute 0 01 Thousands/µL     UA (URINE) with reflex to Scope [[id number]]  (Abnormal) Collected:  12/28/19 1604    Lab Status:  Final result Specimen:  Urine, Clean Catch Updated:  12/28/19 1618     Color, UA Yellow     Clarity, UA Clear     Specific Gravity, UA 1 010     pH, UA 6 0     Leukocytes, UA Negative Nitrite, UA Negative     Protein, UA Negative mg/dl      Glucose, UA Negative mg/dl      Ketones, UA 15 (1+) mg/dl      Urobilinogen, UA 4 0 E U /dl      Bilirubin, UA Negative     Blood, UA Negative                 XR chest 1 view portable   ED Interpretation by Nuria Elaine MD ([id number]/[phone])   NAD                 Procedures  Procedures         ED Course                               MDM  Number of Diagnoses or Management Options  Influenza:   Diagnosis management comments: Pt  With positive flu but out of window for tamiflu  Advised rest, symptomatic tx, give it a few more days  Disposition  Final diagnoses:   Influenza     Time reflects when diagnosis was documented in both MDM as applicable and the Disposition within this note     Time User Action Codes Description Comment    61/76/0174  2:89 PM Donavon Trevizo Add [U88 9] Influenza       ED Disposition     ED Disposition Condition Date/Time Comment    Discharge Stable Sat Dec 28, 2019  4:57 PM Samantha Morris discharge to home/self care  Follow-up Information     Follow up With Specialties Details Why Contact Info    Ector Mao, DO Family Medicine  As needed [address]   [phone]            Patient's Medications   Discharge Prescriptions    No medications on file     No discharge procedures on file      ED Provider  Electronically Signed by           Nuria Elaine MD  49/32/01 0909

## 2019-12-28 NOTE — DISCHARGE INSTRUCTIONS
The flu is a virus which will run its course on its own in about a week  Use over the counter cough and cold medicine as needed for symptoms  Use tylenol and/or advil as needed for fever or pain  Rest as needed

## 2020-01-30 ENCOUNTER — OFFICE VISIT (OUTPATIENT)
Dept: FAMILY MEDICINE CLINIC | Facility: CLINIC | Age: 25
End: 2020-01-30
Payer: COMMERCIAL

## 2020-01-30 VITALS
SYSTOLIC BLOOD PRESSURE: 108 MMHG | OXYGEN SATURATION: 100 % | DIASTOLIC BLOOD PRESSURE: 76 MMHG | TEMPERATURE: 98.4 F | HEIGHT: 62 IN | WEIGHT: 157.8 LBS | BODY MASS INDEX: 29.04 KG/M2 | RESPIRATION RATE: 18 BRPM | HEART RATE: 73 BPM

## 2020-01-30 DIAGNOSIS — J10.1 INFLUENZA A: Primary | ICD-10-CM

## 2020-01-30 DIAGNOSIS — J45.20 MILD INTERMITTENT ASTHMA WITHOUT COMPLICATION: ICD-10-CM

## 2020-01-30 PROCEDURE — 99213 OFFICE O/P EST LOW 20 MIN: CPT | Performed by: FAMILY MEDICINE

## 2020-01-30 PROCEDURE — 3008F BODY MASS INDEX DOCD: CPT | Performed by: FAMILY MEDICINE

## 2020-01-30 NOTE — PROGRESS NOTES
Assessment/Plan:       Diagnoses and all orders for this visit:    Influenza A  Resolved at this time    Mild intermittent asthma  Albuterol inhaler sent to patient's pharmacy    [facility] paperwork filled out for patient for 12/27-12/31 that she was out of work     RTO as needed for annual physical        Subjective:      Patient ID: Kenrick Ledesma is a 22 y o  female  22year old female presents for a follow up of influenza virus  She began experiencing symptoms on December 25 and was diagnosed with Influenza A in an urgent care the following day  Patient was feeling so sick that she missed 4 days of work at that time  She was experiencing fever, chills, cough, body aches and severe fatigue  She was not able to come to work during those 4 days  Once she was having improvement, she returned to work  Currently, she is symptom free  She is able to work at this time with no problems  Requesting a refill of her rescue inhaler  The following portions of the patient's history were reviewed and updated as appropriate: allergies, current medications, past family history, past medical history, past social history, past surgical history and problem list     Review of Systems   Constitutional: Negative for chills, fatigue and fever  HENT: Negative  Respiratory: Negative for apnea, cough, shortness of breath and wheezing  Cardiovascular: Negative for chest pain, palpitations and leg swelling  Gastrointestinal: Negative for abdominal pain, constipation, diarrhea, nausea and vomiting  Genitourinary: Negative  Musculoskeletal: Negative  Skin: Negative  Objective:      /76 (BP Location: Left arm, Patient Position: Sitting, Cuff Size: Adult)   Pulse 73   Temp 98 4 °F (36 9 °C) (Tympanic)   Resp 18   Ht 5' 2" (1 575 m)   Wt 71 6 kg (157 lb 12 8 oz)   SpO2 100%   BMI 28 86 kg/m²          Physical Exam   Constitutional: She is oriented to person, place, and time   She appears well-developed and well-nourished  No distress  Neck: Normal range of motion  Neck supple  Cardiovascular: Normal rate, regular rhythm and normal heart sounds  Exam reveals no friction rub  No murmur heard  Pulmonary/Chest: Effort normal and breath sounds normal  No respiratory distress  She has no wheezes  She exhibits no tenderness  Abdominal: Soft  Bowel sounds are normal  She exhibits no distension and no mass  There is no tenderness  Musculoskeletal: Normal range of motion  She exhibits no edema, tenderness or deformity  Neurological: She is alert and oriented to person, place, and time  Skin: Skin is warm and dry  No rash noted  She is not diaphoretic  No erythema  No pallor  Psychiatric: She has a normal mood and affect  Her behavior is normal  Judgment and thought content normal    Nursing note and vitals reviewed

## 2020-02-02 RX ORDER — ALBUTEROL SULFATE 90 UG/1
2 AEROSOL, METERED RESPIRATORY (INHALATION) EVERY 6 HOURS PRN
Qty: 8.5 G | Refills: 2 | Status: SHIPPED | OUTPATIENT
Start: 2020-02-02 | End: 2020-02-05 | Stop reason: SDUPTHER

## 2020-02-05 ENCOUNTER — TELEPHONE (OUTPATIENT)
Dept: FAMILY MEDICINE CLINIC | Facility: CLINIC | Age: 25
End: 2020-02-05

## 2020-02-05 DIAGNOSIS — J45.20 MILD INTERMITTENT ASTHMA WITHOUT COMPLICATION: ICD-10-CM

## 2020-02-05 RX ORDER — ALBUTEROL SULFATE 90 UG/1
2 AEROSOL, METERED RESPIRATORY (INHALATION) EVERY 6 HOURS PRN
Qty: 8.5 G | Refills: 2 | Status: SHIPPED | OUTPATIENT
Start: 2020-02-05 | End: 2020-03-25 | Stop reason: SDUPTHER

## 2020-02-05 NOTE — TELEPHONE ENCOUNTER
Patient would like her Rx for (Albuterol) that was sent on 2/2/2020  to [facility] and would like that to be sent to [address]

## 2020-03-25 DIAGNOSIS — J45.20 MILD INTERMITTENT ASTHMA WITHOUT COMPLICATION: ICD-10-CM

## 2020-03-25 RX ORDER — ALBUTEROL SULFATE 90 UG/1
2 AEROSOL, METERED RESPIRATORY (INHALATION) EVERY 6 HOURS PRN
Qty: 8.5 G | Refills: 2 | Status: SHIPPED | OUTPATIENT
Start: 2020-03-25 | End: 2020-08-04

## 2020-08-03 DIAGNOSIS — J45.20 MILD INTERMITTENT ASTHMA WITHOUT COMPLICATION: ICD-10-CM

## 2020-08-04 RX ORDER — ALBUTEROL SULFATE 90 UG/1
AEROSOL, METERED RESPIRATORY (INHALATION)
Qty: 18 G | Refills: 3 | Status: SHIPPED | OUTPATIENT
Start: 2020-08-04 | End: 2021-02-03 | Stop reason: SDUPTHER

## 2020-10-07 ENCOUNTER — TELEMEDICINE (OUTPATIENT)
Dept: FAMILY MEDICINE CLINIC | Facility: CLINIC | Age: 25
End: 2020-10-07
Payer: COMMERCIAL

## 2020-10-07 DIAGNOSIS — R11.0 NAUSEA: Primary | ICD-10-CM

## 2020-10-07 PROCEDURE — 1036F TOBACCO NON-USER: CPT | Performed by: FAMILY MEDICINE

## 2020-10-07 PROCEDURE — 99214 OFFICE O/P EST MOD 30 MIN: CPT | Performed by: FAMILY MEDICINE

## 2020-10-07 RX ORDER — ONDANSETRON 4 MG/1
4 TABLET, ORALLY DISINTEGRATING ORAL EVERY 6 HOURS PRN
Qty: 20 TABLET | Refills: 0 | Status: SHIPPED | OUTPATIENT
Start: 2020-10-07

## 2020-10-10 ENCOUNTER — HOSPITAL ENCOUNTER (EMERGENCY)
Facility: HOSPITAL | Age: 25
Discharge: HOME | End: 2020-10-10
Attending: EMERGENCY MEDICINE | Admitting: EMERGENCY MEDICINE
Payer: COMMERCIAL

## 2020-10-10 VITALS
SYSTOLIC BLOOD PRESSURE: 140 MMHG | HEART RATE: 78 BPM | BODY MASS INDEX: 28.9 KG/M2 | WEIGHT: 158 LBS | TEMPERATURE: 98.4 F | DIASTOLIC BLOOD PRESSURE: 64 MMHG | RESPIRATION RATE: 16 BRPM | OXYGEN SATURATION: 98 %

## 2020-10-10 DIAGNOSIS — R19.7 DIARRHEA, UNSPECIFIED TYPE: ICD-10-CM

## 2020-10-10 DIAGNOSIS — R11.0 NAUSEA: Primary | ICD-10-CM

## 2020-10-10 LAB
EXT PREG TEST URINE: NEGATIVE
EXT. CONTROL ED NAV: NORMAL

## 2020-10-10 PROCEDURE — 99284 EMERGENCY DEPT VISIT MOD MDM: CPT | Performed by: EMERGENCY MEDICINE

## 2020-10-10 PROCEDURE — 99283 EMERGENCY DEPT VISIT LOW MDM: CPT

## 2020-10-10 PROCEDURE — 81025 URINE PREGNANCY TEST: CPT | Performed by: EMERGENCY MEDICINE

## 2020-10-10 RX ORDER — ONDANSETRON 4 MG/1
4 TABLET, ORALLY DISINTEGRATING ORAL ONCE
Status: COMPLETED | OUTPATIENT
Start: 2020-10-10 | End: 2020-10-10

## 2020-10-10 RX ADMIN — ONDANSETRON 4 MG: 4 TABLET, ORALLY DISINTEGRATING ORAL at 12:32

## 2021-02-03 DIAGNOSIS — J45.20 MILD INTERMITTENT ASTHMA WITHOUT COMPLICATION: ICD-10-CM

## 2021-02-03 RX ORDER — ALBUTEROL SULFATE 90 UG/1
2 AEROSOL, METERED RESPIRATORY (INHALATION) EVERY 6 HOURS PRN
Qty: 18 G | Refills: 1 | Status: SHIPPED | OUTPATIENT
Start: 2021-02-03 | End: 2021-03-29 | Stop reason: SDUPTHER

## 2021-02-03 NOTE — TELEPHONE ENCOUNTER
Patient left message requesting refill on attached medication  Patient last seen on 10/7/2020  Please advise  Thank you

## 2021-03-29 DIAGNOSIS — J45.20 MILD INTERMITTENT ASTHMA WITHOUT COMPLICATION: ICD-10-CM

## 2021-03-29 RX ORDER — ALBUTEROL SULFATE 90 UG/1
2 AEROSOL, METERED RESPIRATORY (INHALATION) EVERY 6 HOURS PRN
Qty: 18 G | Refills: 5 | Status: SHIPPED | OUTPATIENT
Start: 2021-03-29 | End: 2021-08-19

## 2021-08-18 DIAGNOSIS — J45.20 MILD INTERMITTENT ASTHMA WITHOUT COMPLICATION: ICD-10-CM

## 2021-08-19 RX ORDER — ALBUTEROL SULFATE 90 UG/1
AEROSOL, METERED RESPIRATORY (INHALATION)
Qty: 8.5 G | Refills: 5 | Status: SHIPPED | OUTPATIENT
Start: 2021-08-19 | End: 2022-03-15 | Stop reason: SDUPTHER

## 2022-03-15 DIAGNOSIS — J45.20 MILD INTERMITTENT ASTHMA WITHOUT COMPLICATION: ICD-10-CM

## 2022-03-15 RX ORDER — ALBUTEROL SULFATE 90 UG/1
AEROSOL, METERED RESPIRATORY (INHALATION)
COMMUNITY
Start: 2021-02-03 | End: 2022-03-15 | Stop reason: SDUPTHER

## 2022-03-15 RX ORDER — ALBUTEROL SULFATE 90 UG/1
2 AEROSOL, METERED RESPIRATORY (INHALATION) EVERY 6 HOURS PRN
Qty: 8.5 G | Refills: 3 | Status: SHIPPED | OUTPATIENT
Start: 2022-03-15 | End: 2022-06-13

## 2022-07-18 ENCOUNTER — OFFICE VISIT (OUTPATIENT)
Dept: FAMILY MEDICINE CLINIC | Facility: CLINIC | Age: 27
End: 2022-07-18
Payer: COMMERCIAL

## 2022-07-18 VITALS
SYSTOLIC BLOOD PRESSURE: 118 MMHG | TEMPERATURE: 98.9 F | RESPIRATION RATE: 18 BRPM | HEIGHT: 63 IN | DIASTOLIC BLOOD PRESSURE: 70 MMHG | WEIGHT: 159.2 LBS | BODY MASS INDEX: 28.21 KG/M2 | HEART RATE: 87 BPM | OXYGEN SATURATION: 99 %

## 2022-07-18 DIAGNOSIS — Z11.4 SCREENING FOR HIV (HUMAN IMMUNODEFICIENCY VIRUS): ICD-10-CM

## 2022-07-18 DIAGNOSIS — Z23 ENCOUNTER FOR IMMUNIZATION: ICD-10-CM

## 2022-07-18 DIAGNOSIS — Z13.29 SCREENING FOR THYROID DISORDER: ICD-10-CM

## 2022-07-18 DIAGNOSIS — J45.40 MODERATE PERSISTENT ASTHMA, UNSPECIFIED WHETHER COMPLICATED: Primary | ICD-10-CM

## 2022-07-18 DIAGNOSIS — Z11.59 NEED FOR HEPATITIS C SCREENING TEST: ICD-10-CM

## 2022-07-18 DIAGNOSIS — Z13.0 SCREENING FOR DEFICIENCY ANEMIA: ICD-10-CM

## 2022-07-18 DIAGNOSIS — L80 VITILIGO: ICD-10-CM

## 2022-07-18 DIAGNOSIS — Z13.228 SCREENING FOR METABOLIC DISORDER: ICD-10-CM

## 2022-07-18 DIAGNOSIS — Z13.1 SCREENING FOR DIABETES MELLITUS: ICD-10-CM

## 2022-07-18 DIAGNOSIS — Z13.220 SCREENING FOR HYPERLIPIDEMIA: ICD-10-CM

## 2022-07-18 DIAGNOSIS — L30.9 SEVERE ECZEMA: ICD-10-CM

## 2022-07-18 PROBLEM — M79.671 PAIN IN BOTH FEET: Status: RESOLVED | Noted: 2018-05-15 | Resolved: 2022-07-18

## 2022-07-18 PROBLEM — L73.9 FOLLICULITIS: Status: RESOLVED | Noted: 2018-01-16 | Resolved: 2022-07-18

## 2022-07-18 PROBLEM — M79.672 PAIN IN BOTH FEET: Status: RESOLVED | Noted: 2018-05-15 | Resolved: 2022-07-18

## 2022-07-18 PROBLEM — R11.0 NAUSEA: Status: RESOLVED | Noted: 2019-04-17 | Resolved: 2022-07-18

## 2022-07-18 PROCEDURE — 99213 OFFICE O/P EST LOW 20 MIN: CPT | Performed by: FAMILY MEDICINE

## 2022-07-18 PROCEDURE — 3725F SCREEN DEPRESSION PERFORMED: CPT | Performed by: FAMILY MEDICINE

## 2022-07-18 RX ORDER — HYDROCODONE BITARTRATE AND ACETAMINOPHEN 5; 325 MG/1; MG/1
1 TABLET ORAL EVERY 6 HOURS PRN
COMMUNITY
Start: 2022-05-02 | End: 2022-07-18

## 2022-07-18 RX ORDER — TRIAMCINOLONE ACETONIDE 1 MG/G
CREAM TOPICAL 2 TIMES DAILY
Qty: 30 G | Refills: 0 | Status: SHIPPED | OUTPATIENT
Start: 2022-07-18 | End: 2022-09-11

## 2022-07-18 RX ORDER — ALBUTEROL SULFATE 2.5 MG/3ML
2.5 SOLUTION RESPIRATORY (INHALATION) EVERY 6 HOURS PRN
Qty: 180 ML | Refills: 5 | Status: SHIPPED | OUTPATIENT
Start: 2022-07-18 | End: 2022-09-11

## 2022-07-18 RX ORDER — AMOXICILLIN 500 MG/1
500 TABLET, FILM COATED ORAL 3 TIMES DAILY
COMMUNITY
Start: 2022-05-02 | End: 2022-07-18

## 2022-07-18 RX ORDER — ALBUTEROL SULFATE 90 UG/1
2 AEROSOL, METERED RESPIRATORY (INHALATION) EVERY 6 HOURS PRN
Qty: 18 G | Refills: 1 | Status: SHIPPED | OUTPATIENT
Start: 2022-07-18

## 2022-07-18 RX ORDER — MONTELUKAST SODIUM 10 MG/1
10 TABLET ORAL
Qty: 30 TABLET | Refills: 30 | Status: SHIPPED | OUTPATIENT
Start: 2022-07-18 | End: 2022-09-11 | Stop reason: SDUPTHER

## 2022-07-18 RX ORDER — FLUTICASONE PROPIONATE AND SALMETEROL XINAFOATE 115; 21 UG/1; UG/1
2 AEROSOL, METERED RESPIRATORY (INHALATION) 2 TIMES DAILY
Qty: 36 G | Refills: 1 | Status: SHIPPED | OUTPATIENT
Start: 2022-07-18

## 2022-07-18 RX ORDER — CETIRIZINE HYDROCHLORIDE 10 MG/1
10 TABLET ORAL DAILY
Qty: 30 TABLET | Refills: 3 | Status: SHIPPED | OUTPATIENT
Start: 2022-07-18 | End: 2022-09-11 | Stop reason: SDUPTHER

## 2022-07-18 NOTE — PROGRESS NOTES
Assessment/Plan:     Diagnoses and all orders for this visit:    Patient presented with complaint of worsening asthma  She currently does not have any medications such as rescue inhaler  At this time albuterol has been reordered in addition to Singulair  Added Advair to current medication regimen  Patient advised to use 1 puff in the morning and 1 puff at night and brush her teeth after use  · Given the patient has a history of vitiligo  and severe eczema, advised patient to take Singulair daily at bedtime in addition to Zyrtec and use Kenalog cream 2 times a day  Patient was previously on Dupixent injections which she was administering at home  We ordered depicts sent injection 300 mg every 2 weeks  Will follow-up with patient to see if medication has arrived  Only ordered for doses and advised patient to follow-up after the 1st 2 doses( 1 month)   · If patient is seen for annual physical, physician to follow-up regarding Duxipent use  labs ordered however patient was unable to take lab order slip with her  She is due for annual physical and at that time labs will be offered again in addition to Dermatology refer since patient needed to leave  Moderate persistent asthma, unspecified whether complicated  -     albuterol (Ventolin HFA) 90 mcg/act inhaler; Inhale 2 puffs every 6 (six) hours as needed for wheezing  -     Nebulizer Supplies  -     albuterol (2 5 mg/3 mL) 0 083 % nebulizer solution; Take 3 mL (2 5 mg total) by nebulization every 6 (six) hours as needed for wheezing or shortness of breath  -     montelukast (SINGULAIR) 10 mg tablet; Take 1 tablet (10 mg total) by mouth daily at bedtime    Severe eczema  -     montelukast (SINGULAIR) 10 mg tablet; Take 1 tablet (10 mg total) by mouth daily at bedtime  -     cetirizine (ZyrTEC) 10 mg tablet;  Take 1 tablet (10 mg total) by mouth daily  -     triamcinolone (KENALOG) 0 1 % cream; Apply topically 2 (two) times a day  -     dupilumab (DUPIXENT) subcutaneous injection; Inject 2 mL (300 mg total) under the skin every 14 (fourteen) days for 4 doses    Need for hepatitis C screening test  -     Hepatitis C Antibody ([facility]); Future  -     Hepatitis C Antibody ([facility])    Encounter for immunization    Screening for HIV (human immunodeficiency virus)  -     HIV 1/2 Antigen/Antibody (4th Generation) w Reflex [facility]; Future    Vitiligo  -     Ambulatory Referral to Dermatology; Future  -     dupilumab (DUPIXENT) subcutaneous injection; Inject 2 mL (300 mg total) under the skin every 14 (fourteen) days for 4 doses    Screening for deficiency anemia  -     CBC and differential; Future  -     CBC and differential    Screening for metabolic disorder  -     Basic metabolic panel; Future  -     Basic metabolic panel    Screening for hyperlipidemia  -     Lipid Panel with Direct LDL reflex; Future  -     Lipid Panel with Direct LDL reflex    Screening for thyroid disorder    Screening for diabetes mellitus  -     HEMOGLOBIN A1C W/ EAG ESTIMATION; Future  -     HEMOGLOBIN A1C W/ EAG ESTIMATION    ·       Subjective:      Patient ID: Michael Andre is a 33 y o  female  HPI  60-year-old female with a history of vitiligo, asthma, severe eczema presents for follow-up regarding worsening asthma and eczema  Patient states that she has had to use her inhaler daily and has nighttime awakenings more than twice a week  She recently ran out of her albuterol inhaler  In addition, patient has eczema and has had worsening flare ups especially in the summer  She was previously seeing Dermatology and was on doxepin at however her insurance is no longer be covering the previous dermatologist   Patient has been seeing dermatologist since she was a child and would like to reestablish care with someone    The following portions of the patient's history were reviewed and updated as appropriate: allergies, current medications, past family history, past medical history, past social history, past surgical history and problem list     Review of Systems   Constitutional: Negative for chills and fever  HENT: Negative for ear pain and sore throat  Eyes: Negative for pain and visual disturbance  Respiratory: Negative for cough and shortness of breath  Cardiovascular: Negative for chest pain and palpitations  Gastrointestinal: Negative for abdominal pain and vomiting  Genitourinary: Negative for dysuria and hematuria  Musculoskeletal: Negative for arthralgias and back pain  Skin: Positive for rash  Negative for color change  Itching   Neurological: Negative for seizures and syncope  All other systems reviewed and are negative  Objective:      /70   Pulse 87   Temp 98 9 °F (37 2 °C) (Tympanic)   Resp 18   Ht 5' 3" (1 6 m)   Wt 72 2 kg (159 lb 3 2 oz)   SpO2 99%   BMI 28 20 kg/m²          Physical Exam  Vitals reviewed  Constitutional:       Appearance: Normal appearance  HENT:      Head: Normocephalic and atraumatic  Cardiovascular:      Rate and Rhythm: Normal rate and regular rhythm  Pulses: Normal pulses  Heart sounds: Normal heart sounds  Pulmonary:      Effort: Pulmonary effort is normal       Breath sounds: Normal breath sounds  Skin:     General: Skin is warm and dry  Findings: Rash present  Comments: Rash located on her hands  Vitiligo patches on her body   Neurological:      General: No focal deficit present  Mental Status: She is alert     Psychiatric:         Mood and Affect: Mood normal          Behavior: Behavior normal

## 2022-07-26 ENCOUNTER — TELEPHONE (OUTPATIENT)
Dept: FAMILY MEDICINE CLINIC | Facility: CLINIC | Age: 27
End: 2022-07-26

## 2022-07-26 NOTE — TELEPHONE ENCOUNTER
Prior Authorization form received for Dupixent 300mg/2mL syringes  Placed in Dr Darrion Hobbs' folder in the precept room for completion

## 2022-07-28 ENCOUNTER — TELEPHONE (OUTPATIENT)
Dept: FAMILY MEDICINE CLINIC | Facility: CLINIC | Age: 27
End: 2022-07-28

## 2022-08-08 ENCOUNTER — TELEPHONE (OUTPATIENT)
Dept: FAMILY MEDICINE CLINIC | Facility: CLINIC | Age: 27
End: 2022-08-08

## 2022-08-08 NOTE — TELEPHONE ENCOUNTER
Received e-mail regarding PA for Dup[ixent syringes from [employer]  PA was denied  There is an opportunity to appeal if necessary  Per appeal page:     Attachments are recommended; the following attachments may be required: Supporting documents (medication history), Diagnostic workup, Lab results, Chart notes, Original request information, and/or Denial notification  Please review and advise if you would like to appeal or complete a [id number] phone call with insurance company

## 2022-09-11 ENCOUNTER — OFFICE VISIT (OUTPATIENT)
Dept: URGENT CARE | Facility: CLINIC | Age: 27
End: 2022-09-11
Payer: COMMERCIAL

## 2022-09-11 VITALS — RESPIRATION RATE: 14 BRPM | OXYGEN SATURATION: 100 % | TEMPERATURE: 98.6 F | HEART RATE: 93 BPM

## 2022-09-11 DIAGNOSIS — L30.9 SEVERE ECZEMA: Primary | ICD-10-CM

## 2022-09-11 DIAGNOSIS — J45.40 MODERATE PERSISTENT ASTHMA, UNSPECIFIED WHETHER COMPLICATED: ICD-10-CM

## 2022-09-11 PROCEDURE — 99213 OFFICE O/P EST LOW 20 MIN: CPT | Performed by: FAMILY MEDICINE

## 2022-09-11 RX ORDER — PREDNISONE 20 MG/1
40 TABLET ORAL DAILY
Qty: 10 TABLET | Refills: 0 | Status: SHIPPED | OUTPATIENT
Start: 2022-09-11 | End: 2022-09-16

## 2022-09-11 RX ORDER — MONTELUKAST SODIUM 10 MG/1
10 TABLET ORAL
Qty: 90 TABLET | Refills: 1 | Status: SHIPPED | OUTPATIENT
Start: 2022-09-11

## 2022-09-11 RX ORDER — TRIAMCINOLONE ACETONIDE 1 MG/G
CREAM TOPICAL 2 TIMES DAILY
Qty: 453.6 G | Refills: 1 | Status: SHIPPED | OUTPATIENT
Start: 2022-09-11

## 2022-09-11 RX ORDER — CETIRIZINE HYDROCHLORIDE 10 MG/1
10 TABLET ORAL DAILY
Qty: 90 TABLET | Refills: 1 | Status: SHIPPED | OUTPATIENT
Start: 2022-09-11

## 2022-09-11 NOTE — PROGRESS NOTES
[facility]    NAME: José Luis Santiago is a 33 y o  female  : 1995    MRN: [id number]  DATE: 2022  TIME: 11:22 AM    Assessment and Plan   Severe eczema [L30 9]  1  Severe eczema  triamcinolone (KENALOG) 0 1 % cream    montelukast (SINGULAIR) 10 mg tablet    cetirizine (ZyrTEC) 10 mg tablet    predniSONE 20 mg tablet   2  Moderate persistent asthma, unspecified whether complicated  montelukast (SINGULAIR) 10 mg tablet     Severe extensive eczema   Provided patient with a script for triamcinolone larger amount  To start prednisone 40 mg once daily for 5 days  Continue with Zyrtec and Singulair  Advised patient about the importance of applying lotion daily such as Lubriderm or gold Bond  Provided patient with precautionary measures    Patient Instructions   There are no Patient Instructions on file for this visit  Follow up with PCP in 3-5 days  Proceed to ER if symptoms worsen  Chief Complaint     Chief Complaint   Patient presents with    Rash     Pt presents with eczema outbreak that started two weeks ago; History of Present Illness   46-year-old female with known asthma and severe eczema presented for evaluation of recurrent eczema  Patient the past 1-2 weeks her eczema has became much worse  She reports that she is under a lot of stress from work  She denied any new detergents, body wash, shampoo, deodorant, perfume or colon  No new pets cats or dogs  No other symptoms reported by the patient  Review of Systems   Review of Systems   Skin: Positive for rash  All other systems reviewed and are negative  The following portions of the patient's history were reviewed and updated as appropriate: allergies, current medications, past family history, past medical history, past social history, past surgical history and problem list      Medications have been verified    Objective   Pulse 93   Temp 98 6 °F (37 °C)   Resp 14   LMP 2022   SpO2 100%     Physical Exam  Vitals reviewed  Constitutional:       General: She is not in acute distress  Appearance: She is well-developed  She is not diaphoretic  HENT:      Head: Normocephalic and atraumatic  Nose: Nose normal    Eyes:      Conjunctiva/sclera: Conjunctivae normal       Pupils: Pupils are equal, round, and reactive to light  Cardiovascular:      Rate and Rhythm: Normal rate and regular rhythm  Heart sounds: Normal heart sounds  No murmur heard  No friction rub  No gallop  Pulmonary:      Effort: Pulmonary effort is normal  No respiratory distress  Breath sounds: Normal breath sounds  No wheezing or rales  Abdominal:      General: Bowel sounds are normal  There is no distension  Palpations: Abdomen is soft  Tenderness: There is no abdominal tenderness  Musculoskeletal:         General: Normal range of motion  Cervical back: Normal range of motion and neck supple  Skin:     General: Skin is warm and dry  Comments: Extensive eczematous rash involving the entire body from the head to the ankle with no noted openings of the skin or tracking marks but significant excoriation marks   Neurological:      Mental Status: She is alert and oriented to person, place, and time         Leny Graf MD

## 2022-09-11 NOTE — LETTER
September 11, 2022     Patient: Maria E Members   YOB: 1995   Date of Visit: 9/11/2022       To Whom it May Concern:    Paz Daniels was seen in my clinic on 9/11/2022  She may return to work on 9/14/22  If you have any questions or concerns, please don't hesitate to call           Sincerely,          CARE NOW PROVIDER        CC: No Recipients

## 2023-04-27 DIAGNOSIS — J45.40 MODERATE PERSISTENT ASTHMA, UNSPECIFIED WHETHER COMPLICATED: ICD-10-CM

## 2023-04-27 RX ORDER — FLUTICASONE PROPIONATE AND SALMETEROL XINAFOATE 115; 21 UG/1; UG/1
AEROSOL, METERED RESPIRATORY (INHALATION)
Qty: 36 G | Refills: 1 | Status: SHIPPED | OUTPATIENT
Start: 2023-04-27 | End: 2023-04-28 | Stop reason: SDUPTHER

## 2023-04-27 RX ORDER — ALBUTEROL SULFATE 90 UG/1
AEROSOL, METERED RESPIRATORY (INHALATION)
Qty: 8.5 G | OUTPATIENT
Start: 2023-04-27

## 2023-04-28 ENCOUNTER — OFFICE VISIT (OUTPATIENT)
Dept: FAMILY MEDICINE CLINIC | Facility: CLINIC | Age: 28
End: 2023-04-28

## 2023-04-28 VITALS
OXYGEN SATURATION: 99 % | HEIGHT: 62 IN | BODY MASS INDEX: 33.18 KG/M2 | DIASTOLIC BLOOD PRESSURE: 60 MMHG | HEART RATE: 69 BPM | SYSTOLIC BLOOD PRESSURE: 124 MMHG | RESPIRATION RATE: 16 BRPM | TEMPERATURE: 97.9 F | WEIGHT: 180.3 LBS

## 2023-04-28 DIAGNOSIS — F43.10 PTSD (POST-TRAUMATIC STRESS DISORDER): Primary | ICD-10-CM

## 2023-04-28 DIAGNOSIS — R53.83 OTHER FATIGUE: ICD-10-CM

## 2023-04-28 DIAGNOSIS — G43.909 MIGRAINE WITHOUT STATUS MIGRAINOSUS, NOT INTRACTABLE, UNSPECIFIED MIGRAINE TYPE: ICD-10-CM

## 2023-04-28 DIAGNOSIS — J45.40 MODERATE PERSISTENT ASTHMA, UNSPECIFIED WHETHER COMPLICATED: ICD-10-CM

## 2023-04-28 RX ORDER — ACETAMINOPHEN, ASPIRIN AND CAFFEINE 250; 250; 65 MG/1; MG/1; MG/1
1 TABLET, FILM COATED ORAL EVERY 6 HOURS PRN
Qty: 30 TABLET | Refills: 0 | Status: SHIPPED | OUTPATIENT
Start: 2023-04-28

## 2023-04-28 RX ORDER — FLUTICASONE PROPIONATE AND SALMETEROL XINAFOATE 115; 21 UG/1; UG/1
AEROSOL, METERED RESPIRATORY (INHALATION)
Qty: 36 G | Refills: 1 | Status: SHIPPED | OUTPATIENT
Start: 2023-04-28 | End: 2023-04-28

## 2023-04-28 RX ORDER — FLUTICASONE PROPIONATE AND SALMETEROL XINAFOATE 115; 21 UG/1; UG/1
AEROSOL, METERED RESPIRATORY (INHALATION)
Qty: 36 G | Refills: 1 | Status: SHIPPED | OUTPATIENT
Start: 2023-04-28
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
## 2023-05-01 ENCOUNTER — PATIENT OUTREACH (OUTPATIENT)
Dept: FAMILY MEDICINE CLINIC | Facility: CLINIC | Age: 28
End: 2023-05-01

## 2023-05-01 DIAGNOSIS — Z59.819 HOUSING SITUATION UNSTABLE: Primary | ICD-10-CM

## 2023-05-01 SDOH — ECONOMIC STABILITY - HOUSING INSECURITY: HOUSING INSTABILITY UNSPECIFIED: Z59.819
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
## 2023-05-01 NOTE — PROGRESS NOTES
CMOC recived inbasket from [facility] on pt  SWCM note was reviewed in detail today by [facility] did chart review and then outreach pt  [facility] spoke with pt this morning and introduced myself  CMOC communicated pt current situation from reviewing [facility] notes as pt stated that was correct  [facility] asked pt if she had been in contact with [address] [place] [address] and she stated no  [facility] advised pt that since she was starting her job in  [place] that [address] [place] [address] would be able to assist her in [place] because they work with women & children  Pt stated she have their number and know where they are located, but pt stated she has a small dog as well and not sure if that will be a problem  [facility] encourage pt to contact [address] [place] [address] and inquire would they assist pt with her daughter and small dog she have with her  Pt then indicated  her other problem is transportation from where she is currently staying  Pt communicated she is currently staying at the [facility] in [address] [place] [address], until 5/7 and have been there for almost a month  [facility] then provided pt with the [address] in [place], [place] [phone] to contact to see if they can abeba pt with housing considering she will be working soon in [place] at [employer] as pt reported to [facility]  Pt responded she will give them a call and [facility] provided pt with the [address] [place] [address] Drive  [phone] as they can provide transportation for her while working in [place], if she find housing assistance close to her job  [facility] then agreed to call pt back with a F/U call for 5/3 to find any transportation service for pt in [address] to get her to [place]  Pt mentioned she may have to get an [employer] or something if anything  Also, [facility] emailed pt ([email or web address], requested  transportation resources for [employer]     Patient was referred on [facility] for housing and transit             Review of Systems  [facility] will F/U by 5/3

## 2023-05-03 ENCOUNTER — PATIENT OUTREACH (OUTPATIENT)
Dept: FAMILY MEDICINE CLINIC | Facility: CLINIC | Age: 28
End: 2023-05-03

## 2023-05-03 NOTE — PROGRESS NOTES
[address] [place] [address] Marco A [facility] spoke with pt this morning and asked if she received an email from Maximo with the resources for housing and transit  Pt responded that she did not think she did  [facility] informed pt that we could set up an account with Elder Cabral so she can have some type of transportation while in [address] [place] [address] Brie Flores partially set up an account for pt through member  [employer], [address] [place] [address] listedpt email: [email or web address], password: [email or web address]  CMOC informed pt that she needed to provide her insurance card member ID as required, but pt communicated she did not have her card on her at the time of [address] [place] [address] call  [address] [place] [address] then provided pt with the website and asked pt if she could complete her profile to her account later when she has her medical card and pt could then make reservations for transportation online through her cellphone  Pt responded that she could finish her profile and was provided all account website  [address] [place] [address] then asked pt have she spoken with her  from [facility] , and she replied not yet  [facility] [address] [facility] reminded pt that 5/7 was approaching and she will need to know what is the plan come that day with it being her last day that [facility] has her at the [facility] in [address] advised pt to contact her CM and contact the [facility] as well, because they have programs that could help pt since she will be returning to work again and help pt with housing as well  Pt communicated she would give her CM a call as well as the [facility] if needed  [facility] [address] [facility] asked pt would she like for [address] [place] [address] to call her for a follow-up call by this Friday or by 5/8, so pt said by 5/8  Will F/U by 5/8 with pt for an update

## 2023-05-08 ENCOUNTER — PATIENT OUTREACH (OUTPATIENT)
Dept: FAMILY MEDICINE CLINIC | Facility: CLINIC | Age: 28
End: 2023-05-08

## 2023-05-08 NOTE — PROGRESS NOTES
CMOC called pt for F/U if pt finished her [employer] profile for member login and set up any transportation & contact [facility] as well as The [address] for housing assistance, but received no answer as pt voicemail has not been set up      Will F/U by 5/10

## 2023-05-09 ENCOUNTER — PATIENT OUTREACH (OUTPATIENT)
Dept: FAMILY MEDICINE CLINIC | Facility: CLINIC | Age: 28
End: 2023-05-09

## 2023-05-09 NOTE — PROGRESS NOTES
CMOC received inbasket from [facility] on pt  [facility] reviewed SWCM note in detail and did chart review on pt  SWCM routed note indicated that pt will continue receiving assistance from [facility] with funding, assist pt with getting an apartment and telehelth therapy and that CMOC's are no longer needed for services  Therefore, [address] [place] [address] will remove self from pt Care Team as pt will continue to work with [facility]

## 2023-05-09 NOTE — PROGRESS NOTES
[facility] had called the patient via phone  Per chart, CMOC Trina Hines provided information on [facility], [address] [place] [address] Rd  Patient stated she has not called either of those places  Patient stated she spoke with her CM from [facility] and they will help with funding to keep her in hotel until June 2023  Patient stated she should be starting a job by then  Patient stated that they also will help out with an apartment she found in [place], [place]  Patient reported that [facility] is going to do counseling for her via telehealth which she is excited about  SW told patient that all this is good news  [facility] notes CMOC no longer needed at this time  [facility] routed note to [facility]  SW will remain on case  SW told patient that she would continue to f/u

## 2023-05-18 ENCOUNTER — TELEPHONE (OUTPATIENT)
Dept: FAMILY MEDICINE CLINIC | Facility: CLINIC | Age: 28
End: 2023-05-18

## 2023-05-18 DIAGNOSIS — J45.40 MODERATE PERSISTENT ASTHMA, UNSPECIFIED WHETHER COMPLICATED: ICD-10-CM

## 2023-05-18 RX ORDER — FLUTICASONE PROPIONATE AND SALMETEROL XINAFOATE 115; 21 UG/1; UG/1
2 AEROSOL, METERED RESPIRATORY (INHALATION) 2 TIMES DAILY
Qty: 12 G | Refills: 3 | Status: SHIPPED | OUTPATIENT
Start: 2023-05-18

## 2023-05-18 RX ORDER — ALBUTEROL SULFATE 90 UG/1
2 AEROSOL, METERED RESPIRATORY (INHALATION) EVERY 6 HOURS PRN
Qty: 18 G | Refills: 1 | Status: SHIPPED | OUTPATIENT
Start: 2023-05-18

## 2023-05-18 NOTE — TELEPHONE ENCOUNTER
Patient needs refill of albuterol inhaler and the wording on her advair inhaler needs to be clarified by the pharmacy   Patient was not able to get it filled

## 2023-05-25 ENCOUNTER — PATIENT OUTREACH (OUTPATIENT)
Dept: FAMILY MEDICINE CLINIC | Facility: CLINIC | Age: 28
End: 2023-05-25

## 2023-05-25 NOTE — PROGRESS NOTES
[facility] had called the patient via phone  SWFIFI left a voicemail  SWFIFI received a call back from the patient  Patient told BUZZ that she is still waiting on the apartment  Patient stated [employer] is doing her background check so once they have that she will hopefully be able to get her apartment  SWCM advised patient call them on a regular basis for an update  Patient stated she would do so  Patient stated she started her job  Patient stated she also started counseling at [facility]  Patient stated tomorrow is her last day in the hotel  Patient stated she will be staying with her mom until she can get into her own place  SWCM told patient that is a good plan  SWCM advised patient keep her posted on outcome  Patient agreed  BUZZ will continue to f/u

## 2023-06-06 ENCOUNTER — PATIENT OUTREACH (OUTPATIENT)
Dept: FAMILY MEDICINE CLINIC | Facility: CLINIC | Age: 28
End: 2023-06-06

## 2023-06-06 NOTE — PROGRESS NOTES
SWCM had called the patient  SWCM asked patient how she is doing  Patient told SWCM that she is doing well  Patient stated she got her [employer] voucher yesterday  Patient stated she is now looking for some apartments that will accept it  Patient stated she saw a few places yesterday and plans to apply  SWCM told patient that is good news  SWCM told patient that she would be closing the case as goals are completed  Patient will have to f/u with apartment search and section [employer] voucher  Patient understood  SWCM advised patient reach out as needed  SWCM closed episode and removed herself from care team  Please reconsult for future needs

## 2023-06-22 ENCOUNTER — OFFICE VISIT (OUTPATIENT)
Dept: URGENT CARE | Facility: CLINIC | Age: 28
End: 2023-06-22
Payer: COMMERCIAL

## 2023-06-22 VITALS
DIASTOLIC BLOOD PRESSURE: 60 MMHG | RESPIRATION RATE: 14 BRPM | TEMPERATURE: 98 F | HEART RATE: 68 BPM | SYSTOLIC BLOOD PRESSURE: 128 MMHG | HEIGHT: 62 IN | OXYGEN SATURATION: 98 % | WEIGHT: 181.6 LBS | BODY MASS INDEX: 33.42 KG/M2

## 2023-06-22 DIAGNOSIS — J45.901 ASTHMA WITH ACUTE EXACERBATION, UNSPECIFIED ASTHMA SEVERITY, UNSPECIFIED WHETHER PERSISTENT: Primary | ICD-10-CM

## 2023-06-22 PROCEDURE — 99203 OFFICE O/P NEW LOW 30 MIN: CPT | Performed by: PHYSICIAN ASSISTANT

## 2023-06-22 RX ORDER — ALBUTEROL SULFATE 2.5 MG/3ML
2.5 SOLUTION RESPIRATORY (INHALATION) EVERY 6 HOURS PRN
Qty: 90 ML | Refills: 0 | Status: SHIPPED | OUTPATIENT
Start: 2023-06-22

## 2023-06-22 RX ORDER — ALBUTEROL SULFATE 90 UG/1
2 AEROSOL, METERED RESPIRATORY (INHALATION) EVERY 4 HOURS PRN
Qty: 6.7 G | Refills: 0 | Status: SHIPPED | OUTPATIENT
Start: 2023-06-22

## 2023-06-22 RX ORDER — PREDNISONE 20 MG/1
20 TABLET ORAL DAILY
Qty: 4 TABLET | Refills: 0 | Status: SHIPPED | OUTPATIENT
Start: 2023-06-22 | End: 2023-06-26

## 2023-06-22 RX ORDER — IPRATROPIUM BROMIDE AND ALBUTEROL SULFATE 2.5; .5 MG/3ML; MG/3ML
3 SOLUTION RESPIRATORY (INHALATION) ONCE
Status: COMPLETED | OUTPATIENT
Start: 2023-06-22 | End: 2023-06-22

## 2023-06-22 RX ORDER — SODIUM CHLORIDE FOR INHALATION 0.9 %
3 VIAL, NEBULIZER (ML) INHALATION ONCE
Status: COMPLETED | OUTPATIENT
Start: 2023-06-22 | End: 2023-06-22

## 2023-06-22 RX ADMIN — IPRATROPIUM BROMIDE AND ALBUTEROL SULFATE 3 ML: 2.5; .5 SOLUTION RESPIRATORY (INHALATION) at 17:42

## 2023-06-22 RX ADMIN — Medication 3 ML: at 17:42

## 2023-06-22 NOTE — PROGRESS NOTES
[address]        NAME: George Patrick is a 33 y o  female  : 1995    MRN: [id number]  DATE: 2023  TIME: 6:12 PM    Assessment and Plan   Asthma with acute exacerbation, unspecified asthma severity, unspecified whether persistent [J45 901]  1  Asthma with acute exacerbation, unspecified asthma severity, unspecified whether persistent  sodium chloride 0 9 % inhalation solution 3 mL    ipratropium-albuterol (DUO-NEB) 0 5-2 5 mg/3 mL inhalation solution 3 mL    albuterol (Proventil HFA) 90 mcg/act inhaler    albuterol (2 5 mg/3 mL) 0 083 % nebulizer solution    predniSONE 20 mg tablet        Pts pulmonary exam much improved after neb  Recommend pcp follow up  Discussed strict return to care precautions as well as red flag symptoms which should prompt immediate ED referral  Pt verbalized understanding and is in agreement with plan  Please follow up with your primary care provider within the next week  Please remember that your visit today was with an urgent care provider and should not replace follow up with your primary care provider for chronic medical issues or annual physicals  Patient Instructions       Follow up with PCP in 3-5 days  Proceed to  ER if symptoms worsen  Chief Complaint     Chief Complaint   Patient presents with   • Asthma     Pt reports cough and running out of asthma inhaler          History of Present Illness       Pt is a 32 yo female with pmh asthma presenting with cough x 1 month  Has gotten worse over last few days  Ran out of albuterol inhaler and neb  No fevers, congestion, sore throat, or known sick contacts  Cough is sometimes productive of sputum  Review of Systems   Review of Systems   Constitutional: Negative for chills, diaphoresis, fatigue and fever  HENT: Negative for congestion, ear pain, postnasal drip, rhinorrhea, sinus pain, sneezing, sore throat and trouble swallowing  Eyes: Negative for pain and redness     Respiratory: Positive for cough, chest tightness, shortness of breath and wheezing  Cardiovascular: Negative for chest pain and leg swelling  Gastrointestinal: Negative for diarrhea, nausea and vomiting  Musculoskeletal: Negative for myalgias  Neurological: Negative for dizziness, weakness and headaches  Current Medications       Current Outpatient Medications:   •  albuterol (2 5 mg/3 mL) 0 083 % nebulizer solution, Take 3 mL (2 5 mg total) by nebulization every 6 (six) hours as needed for wheezing or shortness of breath, Disp: 90 mL, Rfl: 0  •  albuterol (Proventil HFA) 90 mcg/act inhaler, Inhale 2 puffs every 4 (four) hours as needed for wheezing or shortness of breath (persistent cough), Disp: 6 7 g, Rfl: 0  •  predniSONE 20 mg tablet, Take 1 tablet (20 mg total) by mouth daily for 4 days, Disp: 4 tablet, Rfl: 0  •  albuterol (PROVENTIL HFA,VENTOLIN HFA) 90 mcg/act inhaler, Inhale 2 puffs every 6 (six) hours as needed for wheezing, Disp: 18 g, Rfl: 1  •  aspirin-acetaminophen-caffeine (EXCEDRIN MIGRAINE) 250-250-65 MG per tablet, Take 1 tablet by mouth every 6 (six) hours as needed for headaches, Disp: 30 tablet, Rfl: 0  •  cetirizine (ZyrTEC) 10 mg tablet, Take 1 tablet (10 mg total) by mouth daily (Patient not taking: Reported on 4/28/2023), Disp: 90 tablet, Rfl: 1  •  fluticasone-salmeterol (ADVAIR HFA) 115-21 MCG/ACT inhaler, Inhale 2 puffs 2 (two) times a day USE AS DIRECTED RINSE MOUTH AFTER USE, Disp: 12 g, Rfl: 3  •  montelukast (SINGULAIR) 10 mg tablet, Take 1 tablet (10 mg total) by mouth daily at bedtime (Patient not taking: Reported on 4/28/2023), Disp: 90 tablet, Rfl: 1  •  sertraline (ZOLOFT) 50 mg tablet, Take 1 tablet (50 mg total) by mouth daily, Disp: 30 tablet, Rfl: 0  •  triamcinolone (KENALOG) 0 1 % cream, Apply topically 2 (two) times a day (Patient not taking: Reported on 4/28/2023), Disp: 453 6 g, Rfl: 1  No current facility-administered medications for this visit      Current Allergies "    Allergies as of 2023 - Reviewed 2023   Allergen Reaction Noted   • Peanuts [peanut oil - food allergy] Itching 2016            The following portions of the patient's history were reviewed and updated as appropriate: allergies, current medications, past family history, past medical history, past social history, past surgical history and problem list      Past Medical History:   Diagnosis Date   • Asthma    • Eczema    • Vitiligo        Past Surgical History:   Procedure Laterality Date   •  SECTION             Family History   Problem Relation Age of Onset   • Eczema Mother    • Eczema Maternal Grandfather    • Asthma Maternal Grandfather          Medications have been verified  Objective   /60   Pulse 68   Temp 98 °F (36 7 °C)   Resp 14   Ht 5' 2\" (1 575 m)   Wt 82 4 kg (181 lb 9 6 oz)   SpO2 98%   BMI 33 22 kg/m²        Physical Exam     Physical Exam  Vitals and nursing note reviewed  Constitutional:       General: She is not in acute distress  Appearance: Normal appearance  She is not toxic-appearing  HENT:      Head: Normocephalic and atraumatic  Right Ear: Tympanic membrane, ear canal and external ear normal       Left Ear: Tympanic membrane, ear canal and external ear normal       Nose: No congestion  Mouth/Throat:      Mouth: Mucous membranes are moist       Pharynx: Oropharynx is clear  No oropharyngeal exudate or posterior oropharyngeal erythema  Eyes:      Conjunctiva/sclera: Conjunctivae normal       Pupils: Pupils are equal, round, and reactive to light  Cardiovascular:      Rate and Rhythm: Normal rate and regular rhythm  Heart sounds: Normal heart sounds  Pulmonary:      Effort: Pulmonary effort is normal  No respiratory distress  Breath sounds: Decreased air movement present  Examination of the right-upper field reveals wheezing  Examination of the left-upper field reveals wheezing   Examination of the " right-lower field reveals wheezing  Examination of the left-lower field reveals wheezing  Wheezing present  No rhonchi or rales  Musculoskeletal:      Cervical back: Normal range of motion and neck supple  Skin:     General: Skin is warm and dry  Capillary Refill: Capillary refill takes less than 2 seconds  Neurological:      Mental Status: She is alert and oriented to person, place, and time     Psychiatric:         Behavior: Behavior normal

## 2023-06-27 ENCOUNTER — PATIENT OUTREACH (OUTPATIENT)
Dept: FAMILY MEDICINE CLINIC | Facility: CLINIC | Age: 28
End: 2023-06-27

## 2023-06-27 NOTE — PROGRESS NOTES
[facility] had received a call from this patient  SWCM had called patient back  SWCM left a voicemail  SWCM notes patient called her back  Patient stated she found an apartment through [employer] in [address]  [address]  Patient stated she has to move in on the first and needs 1st month and security  SWCM asked patient if she was not getting funding anymore from [facility]  Patient stated she assumed she would not since she was asked to leave shelter  SWCM advised patient call them back and find out  SW had suggested patient call [facility] for help with move in cost  SW informed patient that [facility] does not have any funds for rental assistance  Patient thanked [facility] for information  SW will continue to keep case closed  Please reconsult for future needs

## 2023-07-11 ENCOUNTER — OFFICE VISIT (OUTPATIENT)
Dept: FAMILY MEDICINE CLINIC | Facility: CLINIC | Age: 28
End: 2023-07-11
Payer: COMMERCIAL

## 2023-07-11 VITALS
SYSTOLIC BLOOD PRESSURE: 127 MMHG | HEART RATE: 82 BPM | HEIGHT: 62 IN | RESPIRATION RATE: 16 BRPM | OXYGEN SATURATION: 97 % | DIASTOLIC BLOOD PRESSURE: 83 MMHG | TEMPERATURE: 97.7 F | BODY MASS INDEX: 33.31 KG/M2 | WEIGHT: 181 LBS

## 2023-07-11 DIAGNOSIS — B34.9 VIRAL INFECTION: ICD-10-CM

## 2023-07-11 DIAGNOSIS — J45.909 SEVERE ASTHMA WITHOUT COMPLICATION, UNSPECIFIED WHETHER PERSISTENT: Primary | ICD-10-CM

## 2023-07-11 PROCEDURE — 99213 OFFICE O/P EST LOW 20 MIN: CPT | Performed by: FAMILY MEDICINE

## 2023-07-11 RX ORDER — FLUTICASONE FUROATE AND VILANTEROL 100; 25 UG/1; UG/1
1 POWDER RESPIRATORY (INHALATION) DAILY
Qty: 60 BLISTER | Refills: 1 | Status: SHIPPED | OUTPATIENT
Start: 2023-07-11 | End: 2024-01-07

## 2023-07-11 RX ORDER — ALBUTEROL SULFATE 90 UG/1
2 AEROSOL, METERED RESPIRATORY (INHALATION) EVERY 6 HOURS PRN
Qty: 6.7 G | Refills: 5 | Status: SHIPPED | OUTPATIENT
Start: 2023-07-11

## 2023-07-11 RX ORDER — FLUTICASONE PROPIONATE 50 MCG
1 SPRAY, SUSPENSION (ML) NASAL DAILY
Qty: 9.9 ML | Refills: 3 | Status: SHIPPED | OUTPATIENT
Start: 2023-07-11

## 2023-07-11 RX ORDER — EPINEPHRINE 0.3 MG/.3ML
INJECTION SUBCUTANEOUS
COMMUNITY
Start: 2023-04-29

## 2023-07-11 RX ORDER — PREDNISONE 10 MG/1
TABLET ORAL
Qty: 30 TABLET | Refills: 0 | Status: SHIPPED | OUTPATIENT
Start: 2023-07-11 | End: 2023-07-23

## 2023-07-11 NOTE — ASSESSMENT & PLAN NOTE
Acute on chronic     Has had asthma since 2year old. States that she has no animals and entire house is carpetted. Currently works at [employer] and states that her symptoms arent as bad at work as it is at home. Symptoms worsened at home where she has carpet and currently sleeps on a matress on the floor since she just moved in. Pt states that she experiences symptoms 4-5 times a day and a couple times during the time. She uses the albuterol inhaler 5 times in the day and nebulizer a couple times before she sleeps. Has tried multiple medications in the past including Advair, Singulair, Dupixent (not for long enough) with minimal relief of symptoms. Recently seen in urgent care for upper viral infection which worsened breathing and was prescribed prednisone 20 mg for 4 days. Peak flow meter done in office. Goal of 470 for age and weight. Pt was hitting 180-200 in the office. Pt given peak flow meter to take home. · Albuterol inhaler as needed  · Prednisone taper (40 mg x 3 days, 30 mg x 3 days, etc)  · Begin Breo ellipta (100-25 mcg) as controller inhaler one puff a day  · Advised pt that if this does not work, we can increase the dose   · Referral for pulmonology   · If symptoms worsen and inhalers are not helping, go to ED.

## 2023-07-11 NOTE — PROGRESS NOTES
[facility] Office visit    Assessment/Plan:     1. Severe asthma without complication, unspecified whether persistent  Assessment & Plan:  Acute on chronic     Has had asthma since 2year old. States that she has no animals and entire house is carpetted. Currently works at [employer] and states that her symptoms arent as bad at work as it is at home. Symptoms worsened at home where she has carpet and currently sleeps on a matress on the floor since she just moved in. Pt states that she experiences symptoms 4-5 times a day and a couple times during the time. She uses the albuterol inhaler 5 times in the day and nebulizer a couple times before she sleeps. Has tried multiple medications in the past including Advair, Singulair, Dupixent (not for long enough) with minimal relief of symptoms. Recently seen in urgent care for upper viral infection which worsened breathing and was prescribed prednisone 20 mg for 4 days. Peak flow meter done in office. Goal of 470 for age and weight. Pt was hitting 180-200 in the office. Pt given peak flow meter to take home. · Albuterol inhaler as needed  · Prednisone taper (40 mg x 3 days, 30 mg x 3 days, etc)  · Begin Breo ellipta (100-25 mcg) as controller inhaler one puff a day  · Advised pt that if this does not work, we can increase the dose   · Referral for pulmonology   · If symptoms worsen and inhalers are not helping, go to ED. Orders:  -     Fluticasone Furoate-Vilanterol 100-25 mcg/actuation inhaler; Inhale 1 puff daily Rinse mouth after use. -     Ambulatory Referral to Pulmonology; Future  -     albuterol (Proventil HFA) 90 mcg/act inhaler; Inhale 2 puffs every 6 (six) hours as needed for wheezing  -     predniSONE 10 mg tablet; Take 4 tablets (40 mg total) by mouth daily for 3 days, THEN 3 tablets (30 mg total) daily for 3 days, THEN 2 tablets (20 mg total) daily for 3 days, THEN 1 tablet (10 mg total) daily for 3 days.   -     fluticasone (FLONASE) 50 mcg/act nasal spray; 1 spray into each nostril daily    2. Viral infection  -     predniSONE 10 mg tablet; Take 4 tablets (40 mg total) by mouth daily for 3 days, THEN 3 tablets (30 mg total) daily for 3 days, THEN 2 tablets (20 mg total) daily for 3 days, THEN 1 tablet (10 mg total) daily for 3 days. Return in about 2 weeks (around 7/25/2023) for Follow-up Asthma . Subjective:   AYDE Coffman is a 33 y.o. female here to follow-up asthma. Denies any other acute symptoms at this time. Review of Systems   Constitutional: Negative for chills and fever. HENT: Negative for ear pain and sore throat. Eyes: Negative for pain and visual disturbance. Respiratory: Negative for cough and shortness of breath. Cardiovascular: Negative for chest pain and palpitations. Gastrointestinal: Negative for abdominal pain and vomiting. Genitourinary: Negative for dysuria and hematuria. Musculoskeletal: Negative for arthralgias and back pain. Skin: Negative for color change and rash. Neurological: Negative for seizures and syncope. All other systems reviewed and are negative. Objective:     /83 (BP Location: Left arm, Patient Position: Sitting, Cuff Size: Large)   Pulse 82   Temp 97.7 °F (36.5 °C) (Tympanic)   Resp 16   Ht 5' 2" (1.575 m)   Wt 82.1 kg (181 lb)   LMP 06/18/2023 (Exact Date)   SpO2 97%   BMI 33.11 kg/m²      Physical Exam  Constitutional:       Appearance: Normal appearance. HENT:      Head: Atraumatic. Eyes:      General:         Right eye: No discharge. Left eye: No discharge. Conjunctiva/sclera: Conjunctivae normal.   Cardiovascular:      Rate and Rhythm: Normal rate and regular rhythm. Pulmonary:      Effort: Pulmonary effort is normal. No respiratory distress. Comments: Decreased breath sounds. Abdominal:      General: Bowel sounds are normal.      Palpations: Abdomen is soft. Tenderness: There is no abdominal tenderness. Musculoskeletal:      Cervical back: Neck supple. Skin:     General: Skin is warm and dry. Capillary Refill: Capillary refill takes less than 2 seconds. Comments: Diffused vitiligo noted on skin. Neurological:      Mental Status: She is alert.           ** Please Note: This note has been constructed using a voice recognition system Beatrice Hassan MD  07/11/23  4:42 PM

## 2023-09-28 ENCOUNTER — HOSPITAL ENCOUNTER (EMERGENCY)
Facility: HOSPITAL | Age: 28
Discharge: HOME | End: 2023-09-28
Attending: EMERGENCY MEDICINE | Admitting: EMERGENCY MEDICINE
Payer: COMMERCIAL

## 2023-09-28 VITALS
TEMPERATURE: 100.3 F | RESPIRATION RATE: 22 BRPM | DIASTOLIC BLOOD PRESSURE: 72 MMHG | HEART RATE: 99 BPM | SYSTOLIC BLOOD PRESSURE: 135 MMHG | BODY MASS INDEX: 34.42 KG/M2 | OXYGEN SATURATION: 98 % | WEIGHT: 188.2 LBS

## 2023-09-28 DIAGNOSIS — J45.901 ASTHMA EXACERBATION: Primary | ICD-10-CM

## 2023-09-28 LAB
FLUAV RNA RESP QL NAA+PROBE: NEGATIVE
FLUBV RNA RESP QL NAA+PROBE: NEGATIVE
RSV RNA RESP QL NAA+PROBE: NEGATIVE
SARS-COV-2 RNA RESP QL NAA+PROBE: NEGATIVE

## 2023-09-28 PROCEDURE — 94640 AIRWAY INHALATION TREATMENT: CPT

## 2023-09-28 PROCEDURE — 99284 EMERGENCY DEPT VISIT MOD MDM: CPT

## 2023-09-28 PROCEDURE — 99284 EMERGENCY DEPT VISIT MOD MDM: CPT | Performed by: EMERGENCY MEDICINE

## 2023-09-28 PROCEDURE — 0241U HB NFCT DS VIR RESP RNA 4 TRGT: CPT | Performed by: EMERGENCY MEDICINE

## 2023-09-28 RX ORDER — ALBUTEROL SULFATE 90 UG/1
2 AEROSOL, METERED RESPIRATORY (INHALATION) EVERY 4 HOURS PRN
Qty: 18 G | Refills: 0 | Status: SHIPPED | OUTPATIENT
Start: 2023-09-28

## 2023-09-28 RX ORDER — ALBUTEROL SULFATE 90 UG/1
2 AEROSOL, METERED RESPIRATORY (INHALATION) ONCE
Status: COMPLETED | OUTPATIENT
Start: 2023-09-28 | End: 2023-09-28

## 2023-09-28 RX ORDER — IPRATROPIUM BROMIDE AND ALBUTEROL SULFATE 2.5; .5 MG/3ML; MG/3ML
3 SOLUTION RESPIRATORY (INHALATION) ONCE
Status: COMPLETED | OUTPATIENT
Start: 2023-09-28 | End: 2023-09-28

## 2023-09-28 RX ORDER — PREDNISONE 20 MG/1
60 TABLET ORAL ONCE
Status: COMPLETED | OUTPATIENT
Start: 2023-09-28 | End: 2023-09-28

## 2023-09-28 RX ORDER — PREDNISONE 20 MG/1
40 TABLET ORAL DAILY
Qty: 10 TABLET | Refills: 0 | Status: SHIPPED | OUTPATIENT
Start: 2023-09-28 | End: 2023-10-03

## 2023-09-28 RX ORDER — ALBUTEROL SULFATE 2.5 MG/3ML
2.5 SOLUTION RESPIRATORY (INHALATION) EVERY 6 HOURS PRN
Qty: 75 ML | Refills: 0 | Status: SHIPPED | OUTPATIENT
Start: 2023-09-28

## 2023-09-28 RX ADMIN — IPRATROPIUM BROMIDE AND ALBUTEROL SULFATE 3 ML: .5; 3 SOLUTION RESPIRATORY (INHALATION) at 17:18

## 2023-09-28 RX ADMIN — PREDNISONE 60 MG: 20 TABLET ORAL at 17:17

## 2023-09-28 RX ADMIN — ALBUTEROL SULFATE 2 PUFF: 90 AEROSOL, METERED RESPIRATORY (INHALATION) at 18:24

## 2023-09-29 NOTE — ED PROVIDER NOTES
History  Chief Complaint   Patient presents with   • Shortness of Breath     States asthma has been bad and inhaler and nebs are not helping at home any more. Speaking in full sentences. States multiple sick contacts recently. Patient presents for evaluation of shortness of breath and URI symptoms for the last couple days. Patient reports multiple sick contacts recently. States her inhaler was not helping and she has been using more frequently and has run out of it. She does not have any medications for her nebulizer at home. Denies any fever      History provided by:  Patient   used: No    Shortness of Breath  Associated symptoms: cough        Prior to Admission Medications   Prescriptions Last Dose Informant Patient Reported? Taking? EPINEPHrine (EPIPEN) 0.3 mg/0.3 mL SOAJ   Yes No   Sig: inject 0.3 milliliters ( 0.3 milligrams ) intramuscularly in MIDD. ..  (REFER TO PRESCRIPTION NOTES). Fluticasone Furoate-Vilanterol 100-25 mcg/actuation inhaler   No No   Sig: Inhale 1 puff daily Rinse mouth after use.    albuterol (2.5 mg/3 mL) 0.083 % nebulizer solution   No No   Sig: Take 3 mL (2.5 mg total) by nebulization every 6 (six) hours as needed for wheezing or shortness of breath   albuterol (Proventil HFA) 90 mcg/act inhaler   No No   Sig: Inhale 2 puffs every 6 (six) hours as needed for wheezing   aspirin-acetaminophen-caffeine (EXCEDRIN MIGRAINE) 250-250-65 MG per tablet   No No   Sig: Take 1 tablet by mouth every 6 (six) hours as needed for headaches   fluticasone (FLONASE) 50 mcg/act nasal spray   No No   Si spray into each nostril daily   sertraline (ZOLOFT) 50 mg tablet   No No   Sig: Take 1 tablet (50 mg total) by mouth daily      Facility-Administered Medications: None       Past Medical History:   Diagnosis Date   • Asthma    • Eczema    • Vitiligo        Past Surgical History:   Procedure Laterality Date   •  SECTION             Family History Problem Relation Age of Onset   • Eczema Mother    • Eczema Maternal Grandfather    • Asthma Maternal Grandfather      I have reviewed and agree with the history as documented. E-Cigarette/Vaping   • E-Cigarette Use Former User      E-Cigarette/Vaping Substances   • Nicotine No    • THC No    • CBD No    • Flavoring No    • Other No    • Unknown No      Social History     Tobacco Use   • Smoking status: Former     Packs/day: 0.00     Types: Cigars, Cigarettes   • Smokeless tobacco: Never   • Tobacco comments:     Report smokes a weekly cigar   Vaping Use   • Vaping Use: Former   Substance Use Topics   • Alcohol use: Yes     Comment: occasional   • Drug use: Not Currently     Frequency: 1.0 times per week     Types: Marijuana       Review of Systems   HENT: Positive for congestion. Respiratory: Positive for cough and shortness of breath. All other systems reviewed and are negative. Physical Exam  Physical Exam  Vitals and nursing note reviewed. Constitutional:       General: She is in acute distress. Cardiovascular:      Rate and Rhythm: Normal rate and regular rhythm. Pulmonary:      Effort: Respiratory distress present. Breath sounds: Wheezing present. Comments: Mild respiratory distress with bilateral expiratory wheezing  Neurological:      General: No focal deficit present. Mental Status: She is alert and oriented to person, place, and time.          Vital Signs  ED Triage Vitals [09/28/23 1536]   Temperature Pulse Respirations Blood Pressure SpO2   100.3 °F (37.9 °C) 99 22 135/72 98 %      Temp Source Heart Rate Source Patient Position - Orthostatic VS BP Location FiO2 (%)   Tympanic Monitor Sitting Right arm --      Pain Score       --           Vitals:    09/28/23 1536   BP: 135/72   Pulse: 99   Patient Position - Orthostatic VS: Sitting         Visual Acuity      ED Medications  Medications   ipratropium-albuterol (DUO-NEB) 0.5-2.5 mg/3 mL inhalation solution 3 mL (3 mL Nebulization Given 9/28/23 1718)   predniSONE tablet 60 mg (60 mg Oral Given 9/28/23 1717)   albuterol (PROVENTIL HFA,VENTOLIN HFA) inhaler 2 puff (2 puffs Inhalation Given 9/28/23 1824)       Diagnostic Studies  Results Reviewed     Procedure Component Value Units Date/Time    FLU/RSV/COVID - if FLU/RSV clinically relevant [[id number]]  (Normal) Collected: 09/28/23 1721    Lab Status: Final result Specimen: Nares from Nose Updated: 09/28/23 1803     SARS-CoV-2 Negative     INFLUENZA A PCR Negative     INFLUENZA B PCR Negative     RSV PCR Negative    Narrative:      FOR PEDIATRIC PATIENTS - copy/paste COVID Guidelines URL to browser: [email or web address]    SARS-CoV-2 assay is a Nucleic Acid Amplification assay intended for the  qualitative detection of nucleic acid from SARS-CoV-2 in nasopharyngeal  swabs. Results are for the presumptive identification of SARS-CoV-2 RNA. Positive results are indicative of infection with SARS-CoV-2, the virus  causing COVID-19, but do not rule out bacterial infection or co-infection  with other viruses. Laboratories within the [facility] and its  territories are required to report all positive results to the appropriate  public health authorities. Negative results do not preclude SARS-CoV-2  infection and should not be used as the sole basis for treatment or other  patient management decisions. Negative results must be combined with  clinical observations, patient history, and epidemiological information. This test has not been FDA cleared or approved. This test has been authorized by FDA under an Emergency Use Authorization  (EUA).  This test is only authorized for the duration of time the  declaration that circumstances exist justifying the authorization of the  emergency use of an in vitro diagnostic tests for detection of SARS-CoV-2  virus and/or diagnosis of COVID-19 infection under section 564(b)(1) of  the Act, 21 U.S.C. 360bbb-3(b)(1), unless the authorization is terminated  or revoked sooner. The test has been validated but independent review by FDA  and CLIA is pending. Test performed using [employer] GeneXpert: This RT-PCR assay targets N2,  a region unique to SARS-CoV-2. A conserved region in the E-gene was chosen  for pan-Sarbecovirus detection which includes SARS-CoV-2. According to CMS-2020-01-R, this platform meets the definition of high-throughput technology. No orders to display              Procedures  Procedures         ED Course                                             Medical Decision Making  Pulse ox 98% on room air indicating adequate oxygenation. Patient was treated with nebulizer and steroids in the ER. On reexam breathing improved no longer in any distress. We will continue course of oral steroids at home and refill inhaler and nebulizer medications. Patient return to ER symptoms were worsening otherwise to follow-up primary care physician. Asthma exacerbation: acute illness or injury  Amount and/or Complexity of Data Reviewed  Labs: ordered. Risk  Prescription drug management. Disposition  Final diagnoses:   Asthma exacerbation     Time reflects when diagnosis was documented in both MDM as applicable and the Disposition within this note     Time User Action Codes Description Comment    9/28/2023  6:16 PM Phyllis Martinez [R77.148] Asthma exacerbation       ED Disposition     ED Disposition   Discharge    Condition   Stable    Date/Time   u Sep 28, 2023  6:16 PM    Comment   Renato Lung discharge to home/self care.                Follow-up Information     Follow up With Specialties Details Why Contact Info    [employer]  In 1 week [phone]            Discharge Medication List as of 9/28/2023  6:20 PM      START taking these medications    Details   !! albuterol (2.5 mg/3 mL) 0.083 % nebulizer solution Take 3 mL (2.5 mg total) by nebulization every 6 (six) hours as needed for wheezing or shortness of breath, Starting Thu 9/28/2023, Normal      !! albuterol (PROVENTIL HFA,VENTOLIN HFA) 90 mcg/act inhaler Inhale 2 puffs every 4 (four) hours as needed for wheezing, Starting Thu 9/28/2023, Normal      predniSONE 20 mg tablet Take 2 tablets (40 mg total) by mouth daily for 5 days, Starting Thu 9/28/2023, Until Tue 10/3/2023, Normal       !! - Potential duplicate medications found. Please discuss with provider. CONTINUE these medications which have NOT CHANGED    Details   !! albuterol (2.5 mg/3 mL) 0.083 % nebulizer solution Take 3 mL (2.5 mg total) by nebulization every 6 (six) hours as needed for wheezing or shortness of breath, Starting Thu 6/22/2023, Normal      !! albuterol (Proventil HFA) 90 mcg/act inhaler Inhale 2 puffs every 6 (six) hours as needed for wheezing, Starting Tue 7/11/2023, Normal      aspirin-acetaminophen-caffeine (EXCEDRIN MIGRAINE) 250-250-65 MG per tablet Take 1 tablet by mouth every 6 (six) hours as needed for headaches, Starting Fri 4/28/2023, Normal      EPINEPHrine (EPIPEN) 0.3 mg/0.3 mL SOAJ inject 0.3 milliliters ( 0.3 milligrams ) intramuscularly in MIDD. ..  (REFER TO PRESCRIPTION NOTES). , Historical Med      fluticasone (FLONASE) 50 mcg/act nasal spray 1 spray into each nostril daily, Starting Tue 7/11/2023, Normal      Fluticasone Furoate-Vilanterol 100-25 mcg/actuation inhaler Inhale 1 puff daily Rinse mouth after use., Starting Tue 7/11/2023, Until Sun 1/7/2024, Normal      sertraline (ZOLOFT) 50 mg tablet Take 1 tablet (50 mg total) by mouth daily, Starting Fri 4/28/2023, Until Tue 7/11/2023, Normal       !! - Potential duplicate medications found. Please discuss with provider. No discharge procedures on file.     PDMP Review     None          ED Provider  Electronically Signed by           Barron Perea DO  09/29/23 9398

## 2023-10-28 ENCOUNTER — HOSPITAL ENCOUNTER (EMERGENCY)
Facility: HOSPITAL | Age: 28
Discharge: HOME | End: 2023-10-28
Attending: EMERGENCY MEDICINE | Admitting: EMERGENCY MEDICINE
Payer: COMMERCIAL

## 2023-10-28 VITALS
RESPIRATION RATE: 24 BRPM | TEMPERATURE: 98 F | DIASTOLIC BLOOD PRESSURE: 74 MMHG | HEART RATE: 60 BPM | OXYGEN SATURATION: 100 % | SYSTOLIC BLOOD PRESSURE: 145 MMHG

## 2023-10-28 DIAGNOSIS — J45.901 ASTHMA EXACERBATION: Primary | ICD-10-CM

## 2023-10-28 PROCEDURE — 94640 AIRWAY INHALATION TREATMENT: CPT

## 2023-10-28 PROCEDURE — 99283 EMERGENCY DEPT VISIT LOW MDM: CPT

## 2023-10-28 PROCEDURE — 99284 EMERGENCY DEPT VISIT MOD MDM: CPT | Performed by: EMERGENCY MEDICINE

## 2023-10-28 RX ORDER — ALBUTEROL SULFATE 90 UG/1
2 AEROSOL, METERED RESPIRATORY (INHALATION) ONCE
Status: COMPLETED | OUTPATIENT
Start: 2023-10-28 | End: 2023-10-28

## 2023-10-28 RX ORDER — PREDNISONE 20 MG/1
40 TABLET ORAL DAILY
Qty: 8 TABLET | Refills: 0 | Status: SHIPPED | OUTPATIENT
Start: 2023-10-29 | End: 2023-11-02

## 2023-10-28 RX ORDER — ALBUTEROL SULFATE 2.5 MG/3ML
5 SOLUTION RESPIRATORY (INHALATION) ONCE
Status: COMPLETED | OUTPATIENT
Start: 2023-10-28 | End: 2023-10-28

## 2023-10-28 RX ORDER — PREDNISONE 20 MG/1
60 TABLET ORAL ONCE
Status: COMPLETED | OUTPATIENT
Start: 2023-10-28 | End: 2023-10-28

## 2023-10-28 RX ADMIN — PREDNISONE 60 MG: 20 TABLET ORAL at 14:46

## 2023-10-28 RX ADMIN — IPRATROPIUM BROMIDE 0.5 MG: 0.5 SOLUTION RESPIRATORY (INHALATION) at 14:47

## 2023-10-28 RX ADMIN — ALBUTEROL SULFATE 5 MG: 2.5 SOLUTION RESPIRATORY (INHALATION) at 14:47

## 2023-10-28 RX ADMIN — ALBUTEROL SULFATE 2 PUFF: 90 AEROSOL, METERED RESPIRATORY (INHALATION) at 15:48

## 2023-10-28 NOTE — ED PROVIDER NOTES
History  Chief Complaint   Patient presents with    Asthma     SOB for a few days, ran out of meds      Pt is a 28yo F who presents for shortness of breath. Patient reports she has a history of asthma for which she has frequent exacerbations. Patient states she is not on any daily medications but has albuterol as needed. Patient reports she does use her albuterol multiple times per day. Patient states she has never followed with pulmonology but does have an appointment upcoming in 2 days. Patient states that today while at work there was a lot of dust and it exacerbated her breathing. She reports increased shortness of breath and wheezing. Patient states she got some relief from her albuterol at home but was still having symptoms and therefore came in for further evaluation. Patient reports she is also been coughing and states this has been consistent for approximately 1 month. Patient denies any other medical problems aside from asthma and eczema. Prior to Admission Medications   Prescriptions Last Dose Informant Patient Reported? Taking? EPINEPHrine (EPIPEN) 0.3 mg/0.3 mL SOAJ   Yes No   Sig: inject 0.3 milliliters ( 0.3 milligrams ) intramuscularly in MIDD. ..  (REFER TO PRESCRIPTION NOTES). Fluticasone Furoate-Vilanterol 100-25 mcg/actuation inhaler   No No   Sig: Inhale 1 puff daily Rinse mouth after use.    albuterol (2.5 mg/3 mL) 0.083 % nebulizer solution   No No   Sig: Take 3 mL (2.5 mg total) by nebulization every 6 (six) hours as needed for wheezing or shortness of breath   albuterol (2.5 mg/3 mL) 0.083 % nebulizer solution   No No   Sig: Take 3 mL (2.5 mg total) by nebulization every 6 (six) hours as needed for wheezing or shortness of breath   albuterol (PROVENTIL HFA,VENTOLIN HFA) 90 mcg/act inhaler   No No   Sig: Inhale 2 puffs every 4 (four) hours as needed for wheezing   albuterol (Proventil HFA) 90 mcg/act inhaler   No No   Sig: Inhale 2 puffs every 6 (six) hours as needed for wheezing   aspirin-acetaminophen-caffeine (EXCEDRIN MIGRAINE) 250-250-65 MG per tablet   No No   Sig: Take 1 tablet by mouth every 6 (six) hours as needed for headaches   fluticasone (FLONASE) 50 mcg/act nasal spray   No No   Si spray into each nostril daily   sertraline (ZOLOFT) 50 mg tablet   No No   Sig: Take 1 tablet (50 mg total) by mouth daily      Facility-Administered Medications: None       Past Medical History:   Diagnosis Date    Asthma     Eczema     Vitiligo        Past Surgical History:   Procedure Laterality Date     SECTION             Family History   Problem Relation Age of Onset    Eczema Mother     Eczema Maternal Grandfather     Asthma Maternal Grandfather      I have reviewed and agree with the history as documented. E-Cigarette/Vaping    E-Cigarette Use Former User      E-Cigarette/Vaping Substances    Nicotine No     THC No     CBD No     Flavoring No     Other No     Unknown No      Social History     Tobacco Use    Smoking status: Former     Packs/day: 0.00     Types: Cigars, Cigarettes    Smokeless tobacco: Never    Tobacco comments:     Report smokes a weekly cigar   Vaping Use    Vaping Use: Former   Substance Use Topics    Alcohol use: Yes     Comment: occasional    Drug use: Not Currently     Frequency: 1.0 times per week     Types: Marijuana       Review of Systems   Respiratory:  Positive for cough, chest tightness, shortness of breath and wheezing. All other systems reviewed and are negative. Physical Exam  Physical Exam  Vitals reviewed. Constitutional:       General: She is not in acute distress. Appearance: She is well-developed. She is not toxic-appearing or diaphoretic. HENT:      Head: Normocephalic and atraumatic. Right Ear: External ear normal.      Left Ear: External ear normal.      Nose: Nose normal.      Mouth/Throat:      Pharynx: Oropharynx is clear. Eyes:      Extraocular Movements: Extraocular movements intact. Pupils: Pupils are equal, round, and reactive to light. Cardiovascular:      Rate and Rhythm: Normal rate and regular rhythm. Heart sounds: Normal heart sounds. Pulmonary:      Effort: Pulmonary effort is normal. Tachypnea (mild) present. No accessory muscle usage, respiratory distress or retractions. Breath sounds: Decreased air movement present. Wheezing (diffuse, end expiratory) present. Abdominal:      General: Bowel sounds are normal.      Palpations: Abdomen is soft. Musculoskeletal:         General: Normal range of motion. Cervical back: Normal range of motion and neck supple. Skin:     General: Skin is warm and dry. Capillary Refill: Capillary refill takes less than 2 seconds. Neurological:      Mental Status: She is alert and oriented to person, place, and time. Psychiatric:         Speech: Speech normal.         Behavior: Behavior is cooperative.          Vital Signs  ED Triage Vitals [10/28/23 1430]   Temperature Pulse Respirations Blood Pressure SpO2   98 °F (36.7 °C) 92 (!) 24 153/79 97 %      Temp Source Heart Rate Source Patient Position - Orthostatic VS BP Location FiO2 (%)   Oral Monitor Lying Right arm --      Pain Score       --           Vitals:    10/28/23 1430 10/28/23 1445 10/28/23 1500 10/28/23 1515   BP: 153/79 138/78 145/74    Pulse: 92 88 76 60   Patient Position - Orthostatic VS: Lying            Visual Acuity      ED Medications  Medications   albuterol inhalation solution 5 mg (5 mg Nebulization Given 10/28/23 1447)   ipratropium (ATROVENT) 0.02 % inhalation solution 0.5 mg (0.5 mg Nebulization Given 10/28/23 1447)   predniSONE tablet 60 mg (60 mg Oral Given 10/28/23 1446)   albuterol (PROVENTIL HFA,VENTOLIN HFA) inhaler 2 puff (2 puffs Inhalation Given 10/28/23 1548)       Diagnostic Studies  Results Reviewed       None                   No orders to display              Procedures  Procedures         ED Course  ED Course as of 10/28/23 1554   Sat Oct 28, 2023   1537 Reassessed pt who states that she is overall feeling better. She is resting comfortably and tachypnea resolves. Pt still with trace wheezing but significantly decreased and improved air movement bilaterally. Discussed with pt plan for DC with pulm follow up and pt agreeable. SBIRT 20yo+      Flowsheet Row Most Recent Value   Initial Alcohol Screen: US AUDIT-C     1. How often do you have a drink containing alcohol? 0 Filed at: 10/28/2023 1437   2. How many drinks containing alcohol do you have on a typical day you are drinking? 0 Filed at: 10/28/2023 1434   3b. FEMALE Any Age, or MALE 65+: How often do you have 4 or more drinks on one occassion? 0 Filed at: 10/28/2023 1434   Audit-C Score 0 Filed at: 10/28/2023 1434   BARBI: How many times in the past year have you. .. Used an illegal drug or used a prescription medication for non-medical reasons? Never Filed at: 10/28/2023 1434                      Medical Decision Making  Pt is a 26yo F who presents with SOB. Exam pertinent for wheezing and decreased air movement. Differential diagnosis to include but not limited to asthma exacerbation, viral syndrome, pneumonia. Likely asthma exacerbation based on history and exam. No fevers or progressive symptoms indicative of pneumonia, no imaging indicated at this time. See ED course for results and details. Plan to discharge pt with f/u to pulmonology. Discussed returning the ED with new or worsening of symptoms. Discussed taking new medication as prescribed and to completion. Pt expressed understanding of discharge instructions, return precautions, and medication instructions and is stable for discharge at this time. All questions were answered and pt was discharged without incident. Risk  Prescription drug management.              Disposition  Final diagnoses:   Asthma exacerbation     Time reflects when diagnosis was documented in both MDM as applicable and the Disposition within this note       Time User Action Codes Description Comment    10/28/2023  2:57 PM Raven Colindres Add [V63.383] Asthma exacerbation           ED Disposition       ED Disposition   Discharge    Condition   Stable    Date/Time   Sat Oct 28, 2023 0537    Comment   Thang Purdy discharge to home/self care. Follow-up Information       Follow up With Specialties Details Why Contact Info    Arielle Harris DO Pulmonology, Critical Care Medicine, Neurology, Pulmonary Disease  Keep upcoming appointment [address] [address]  [phone]              Discharge Medication List as of 10/28/2023  3:39 PM        START taking these medications    Details   predniSONE 20 mg tablet Take 2 tablets (40 mg total) by mouth daily for 4 days Do not start before October 29, 2023., Starting Sun 10/29/2023, Until Thu 11/2/2023, Normal           CONTINUE these medications which have NOT CHANGED    Details   !! albuterol (2.5 mg/3 mL) 0.083 % nebulizer solution Take 3 mL (2.5 mg total) by nebulization every 6 (six) hours as needed for wheezing or shortness of breath, Starting Thu 6/22/2023, Normal      !! albuterol (2.5 mg/3 mL) 0.083 % nebulizer solution Take 3 mL (2.5 mg total) by nebulization every 6 (six) hours as needed for wheezing or shortness of breath, Starting Thu 9/28/2023, Normal      !! albuterol (Proventil HFA) 90 mcg/act inhaler Inhale 2 puffs every 6 (six) hours as needed for wheezing, Starting Tue 7/11/2023, Normal      !! albuterol (PROVENTIL HFA,VENTOLIN HFA) 90 mcg/act inhaler Inhale 2 puffs every 4 (four) hours as needed for wheezing, Starting Thu 9/28/2023, Normal      aspirin-acetaminophen-caffeine (EXCEDRIN MIGRAINE) 250-250-65 MG per tablet Take 1 tablet by mouth every 6 (six) hours as needed for headaches, Starting Fri 4/28/2023, Normal      EPINEPHrine (EPIPEN) 0.3 mg/0.3 mL SOAJ inject 0.3 milliliters ( 0.3 milligrams ) intramuscularly in MIDD. Gisell Bower (REFER TO PRESCRIPTION NOTES). , Historical Med      fluticasone (FLONASE) 50 mcg/act nasal spray 1 spray into each nostril daily, Starting Tue 7/11/2023, Normal      Fluticasone Furoate-Vilanterol 100-25 mcg/actuation inhaler Inhale 1 puff daily Rinse mouth after use., Starting Tue 7/11/2023, Until Sun 1/7/2024, Normal      sertraline (ZOLOFT) 50 mg tablet Take 1 tablet (50 mg total) by mouth daily, Starting Fri 4/28/2023, Until Tue 7/11/2023, Normal       !! - Potential duplicate medications found. Please discuss with provider. No discharge procedures on file.     PDMP Review       None            ED Provider  Electronically Signed by             Imer Villar MD  10/28/23 0760

## 2023-10-28 NOTE — DISCHARGE INSTRUCTIONS
Follow-up with pulmonology for further care. Contact info provided below if needed. Take your new medications as prescribed and to completion. Return to the ED with new or worsening symptoms.

## 2023-10-30 ENCOUNTER — CONSULT (OUTPATIENT)
Dept: PULMONOLOGY | Facility: MEDICAL CENTER | Age: 28
End: 2023-10-30
Payer: COMMERCIAL

## 2023-10-30 VITALS
BODY MASS INDEX: 35.15 KG/M2 | HEIGHT: 62 IN | DIASTOLIC BLOOD PRESSURE: 88 MMHG | HEART RATE: 75 BPM | RESPIRATION RATE: 16 BRPM | SYSTOLIC BLOOD PRESSURE: 128 MMHG | OXYGEN SATURATION: 97 % | TEMPERATURE: 98.8 F | WEIGHT: 191 LBS

## 2023-10-30 DIAGNOSIS — J30.1 SEASONAL ALLERGIC RHINITIS DUE TO POLLEN: ICD-10-CM

## 2023-10-30 DIAGNOSIS — E66.09 CLASS 1 OBESITY DUE TO EXCESS CALORIES WITHOUT SERIOUS COMORBIDITY WITH BODY MASS INDEX (BMI) OF 34.0 TO 34.9 IN ADULT: ICD-10-CM

## 2023-10-30 DIAGNOSIS — J45.30 MILD PERSISTENT ASTHMA WITHOUT COMPLICATION: Primary | ICD-10-CM

## 2023-10-30 DIAGNOSIS — J45.901 ASTHMA WITH ACUTE EXACERBATION, UNSPECIFIED ASTHMA SEVERITY, UNSPECIFIED WHETHER PERSISTENT: ICD-10-CM

## 2023-10-30 PROBLEM — E66.811 CLASS 1 OBESITY DUE TO EXCESS CALORIES WITHOUT SERIOUS COMORBIDITY WITH BODY MASS INDEX (BMI) OF 34.0 TO 34.9 IN ADULT: Status: ACTIVE | Noted: 2023-10-30

## 2023-10-30 PROCEDURE — 99244 OFF/OP CNSLTJ NEW/EST MOD 40: CPT | Performed by: INTERNAL MEDICINE

## 2023-10-30 RX ORDER — ALBUTEROL SULFATE 90 UG/1
2 AEROSOL, METERED RESPIRATORY (INHALATION) EVERY 4 HOURS PRN
Qty: 18 G | Refills: 11 | Status: SHIPPED | OUTPATIENT
Start: 2023-10-30

## 2023-10-30 RX ORDER — ALBUTEROL SULFATE 2.5 MG/3ML
2.5 SOLUTION RESPIRATORY (INHALATION) EVERY 4 HOURS PRN
Qty: 360 ML | Refills: 7 | Status: SHIPPED | OUTPATIENT
Start: 2023-10-30

## 2023-10-30 RX ORDER — FLUTICASONE PROPIONATE AND SALMETEROL 113; 14 UG/1; UG/1
1 POWDER, METERED RESPIRATORY (INHALATION) 2 TIMES DAILY
Qty: 1 EACH | Refills: 11 | Status: SHIPPED | OUTPATIENT
Start: 2023-10-30

## 2023-10-30 NOTE — PROGRESS NOTES
Assessment/Plan:       Problem List Items Addressed This Visit          Respiratory    Mild persistent asthma without complication - Primary     Nancy Whyte was seen in the ER on October 28 for acute exacerbation of asthma. She was prescribed prednisone and this has helped. Her breathing is improved. No wheezing on examination today. Peak flow rate is diminished to 10 L/min with predicted being 400 L/min. I did give her a peak flow meter. Not been on any type of steroid maintenance inhaler for several years. I did order Airduo 217 mcg respiclick she will use 1 puff twice a day. I did recommend she monitor her peak flow rates 2 to 3 times a week    I also placed order for albuterol 2.5 mg for her nebulizer and rescue albuterol inhaler. He requested mask circuit for her nebulizer and I gave her 1 of these. Follow-up in 2 months. I told if she has any problems before then to contact our office. Relevant Medications    albuterol (2.5 mg/3 mL) 0.083 % nebulizer solution    albuterol (Ventolin HFA) 90 mcg/act inhaler    fluticasone-salmeterol (AirDuo RespiClick 064/10) 880-73 mcg/act dry powder inhaler    Seasonal allergic rhinitis due to pollen       Other    Class 1 obesity due to excess calories without serious comorbidity with body mass index (BMI) of 34.0 to 34.9 in adult     Nancy Wyhte states she has gained 50 pounds over the last year or so. She does have history of depression and thinks this may partly be be related to this. I did talked about weight management referral and she was agreeable to this. She denies any excessive daytime somnolence or loud snoring.   I did encourage her to try to watch her diet and lose weight         Relevant Orders    Ambulatory Referral to Weight Management     Other Visit Diagnoses       Asthma with acute exacerbation, unspecified asthma severity, unspecified whether persistent        Relevant Medications    albuterol (2.5 mg/3 mL) 0.083 % nebulizer solution albuterol (Ventolin HFA) 90 mcg/act inhaler    fluticasone-salmeterol (AirDuo RespiClick 731/57) 534-67 mcg/act dry powder inhaler              Plan for follow up:  Return in about 2 months (around 12/30/2023). All questions are answered to the patient's satisfaction and understanding. She verbalizes understanding. She is encouraged to call with any further questions or concerns. Portions of the record may have been created with voice recognition software. Occasional wrong word or "sound a like" substitutions may have occurred due to the inherent limitations of voice recognition software. Read the chart carefully and recognize, using context, where substitutions have occurred. a    Electronically Signed by Julianne Shields DO    ______________________________________________________________________    Chief Complaint:   Chief Complaint   Patient presents with    Asthma     ED follow up          Patient ID: Yonatan Wright is a 33 y.o. y.o. female has a past medical history of Asthma, Eczema, and Vitiligo. 10/30/2023  Patient presents today for initial visit for asthma     HPI      Yonatan Wright is 22years old and has history of asthma since age 2years old. She has never been hospitalized for asthma. She is not on any maintenance inhaler at present time. In the past she thinks she was on Advair before. She has been having problems with her asthma over the past several months. She does get some wheezing and some shortness of breath. Sometimes at night she wakes up short of breath. She has a nebulizer at home which is less than 11years old but needs refill on medication for it as she no longer has any albuterol left. She does have seasonal allergies. She occasionally takes Benadryl and sometimes will use Flonase nasal spray. Not allergic to dogs but she does have allergy to cats. She will get some nasal congestion and watery eyes when exposed to cats. She does have history of allergy to peanuts.   Will have some itching and symptoms some mild shortness of breath with this. Does not get anaphylaxis from it. Does have history of eczema and vitiligo. She was on Dupixent for 4 doses but this was not covered by her insurance anymore and she no longer is on it. She is not sure if this helped her or not as she was not on it long enough. She does not know if it had any effect on her asthma as well. Prior CBC done in 2019 showed no evidence of any eosinophilia. She does have history of depression and history of migraine headache. .    He does have history of depression. She states she did gain 50 pounds over the last year. Does not have any excessive daytime somnolence. Does not have any GERD symptoms. No history of hypertension diabetes mellitus or kidney disease. She is a non-smoker. Sylvie Smith was seen in [address] [place] [address] ER on October 28 for shortness of breath and was prescribed prednisone 40 mg daily for 4 days. This has helped her. Pets/Enviroment: none    Review of Systems   Constitutional:  Negative for chills, fever and unexpected weight change. HENT:  Negative for congestion, rhinorrhea and sore throat. Eyes:  Negative for discharge and redness. Respiratory:  Positive for shortness of breath. Negative for wheezing. Cardiovascular:  Negative for chest pain, palpitations and leg swelling. Gastrointestinal:  Negative for abdominal distention, abdominal pain and nausea. Endocrine: Negative for polydipsia and polyphagia. Genitourinary:  Negative for dysuria. Musculoskeletal:  Negative for joint swelling and myalgias. Skin:  Negative for rash. Neurological:  Negative for light-headedness. Psychiatric/Behavioral:  Negative for decreased concentration. Social history: She reports that she has quit smoking. Her smoking use included cigars and cigarettes. She has never used smokeless tobacco. She reports current alcohol use.  She reports that she does not currently use drugs after having used the following drugs: Marijuana. Frequency: 1.00 time per week. Past surgical history:   Past Surgical History:   Procedure Laterality Date     SECTION           Family history:   Family History   Problem Relation Age of Onset    Eczema Mother     Eczema Maternal Grandfather     Asthma Maternal Grandfather        Immunization History   Administered Date(s) Administered    Influenza Quadrivalent Preservative Free 3 years and older IM 2017    Tdap 2018     Current Outpatient Medications   Medication Sig Dispense Refill    albuterol (2.5 mg/3 mL) 0.083 % nebulizer solution Take 3 mL (2.5 mg total) by nebulization every 4 (four) hours as needed for wheezing or shortness of breath 360 mL 07    albuterol (Proventil HFA) 90 mcg/act inhaler Inhale 2 puffs every 6 (six) hours as needed for wheezing 6.7 g 5    albuterol (Ventolin HFA) 90 mcg/act inhaler Inhale 2 puffs every 4 (four) hours as needed for wheezing 18 g 11    EPINEPHrine (EPIPEN) 0.3 mg/0.3 mL SOAJ inject 0.3 milliliters ( 0.3 milligrams ) intramuscularly in MIDD. ..  (REFER TO PRESCRIPTION NOTES). fluticasone (FLONASE) 50 mcg/act nasal spray 1 spray into each nostril daily 9.9 mL 3    fluticasone-salmeterol (AirDuo RespiClick 638/30) 597-74 mcg/act dry powder inhaler Inhale 1 puff 2 (two) times a day Rinse mouth after use.  1 each 11    predniSONE 20 mg tablet Take 2 tablets (40 mg total) by mouth daily for 4 days Do not start before 2023. 8 tablet 0    albuterol (2.5 mg/3 mL) 0.083 % nebulizer solution Take 3 mL (2.5 mg total) by nebulization every 6 (six) hours as needed for wheezing or shortness of breath 75 mL 0    albuterol (PROVENTIL HFA,VENTOLIN HFA) 90 mcg/act inhaler Inhale 2 puffs every 4 (four) hours as needed for wheezing 18 g 0    aspirin-acetaminophen-caffeine (EXCEDRIN MIGRAINE) 250-250-65 MG per tablet Take 1 tablet by mouth every 6 (six) hours as needed for headaches 30 tablet 0    Fluticasone Furoate-Vilanterol 100-25 mcg/actuation inhaler Inhale 1 puff daily Rinse mouth after use. (Patient not taking: Reported on 10/30/2023) 60 blister 1    sertraline (ZOLOFT) 50 mg tablet Take 1 tablet (50 mg total) by mouth daily 30 tablet 0     No current facility-administered medications for this visit. Allergies: Peanuts [peanut oil - food allergy]    Objective:  Vitals:    10/30/23 0846   BP: 128/88   BP Location: Left arm   Patient Position: Sitting   Cuff Size: Large   Pulse: 75   Resp: 16   Temp: 98.8 °F (37.1 °C)   TempSrc: Temporal   SpO2: 97%   Weight: 86.6 kg (191 lb)   Height: 5' 2" (1.575 m)   Oxygen Therapy  SpO2: 97 %  . Wt Readings from Last 3 Encounters:   10/30/23 86.6 kg (191 lb)   09/28/23 85.4 kg (188 lb 3.2 oz)   07/11/23 82.1 kg (181 lb)     Body mass index is 34.93 kg/m². Physical Exam  Vitals reviewed. Constitutional:       General: She is not in acute distress. Appearance: Normal appearance. She is well-developed. She is obese. HENT:      Head: Normocephalic. Right Ear: External ear normal.      Left Ear: External ear normal.      Nose: Nose normal.      Mouth/Throat:      Mouth: Mucous membranes are moist.      Pharynx: Oropharynx is clear. No oropharyngeal exudate. Comments: Mallampati score is 4  Eyes:      Conjunctiva/sclera: Conjunctivae normal.      Pupils: Pupils are equal, round, and reactive to light. Neck:      Vascular: No JVD. Trachea: No tracheal deviation. Cardiovascular:      Rate and Rhythm: Normal rate and regular rhythm. Heart sounds: Normal heart sounds. Pulmonary:      Effort: Pulmonary effort is normal.      Comments: Lung sounds are clear. No wheezes, crackles or rhonchi  Abdominal:      General: There is no distension. Palpations: Abdomen is soft. Tenderness: There is no abdominal tenderness. There is no guarding. Musculoskeletal:      Cervical back: Neck supple.       Comments: No edema, cyanosis or clubbing. Does have some vitiligo over lower tibial areas and upper extremities. Lymphadenopathy:      Cervical: No cervical adenopathy. Skin:     General: Skin is warm and dry. Findings: No rash. Neurological:      General: No focal deficit present. Mental Status: She is alert and oriented to person, place, and time. Psychiatric:         Mood and Affect: Mood normal.         Behavior: Behavior normal.         Thought Content:  Thought content normal.         Lab Review:   Lab Results   Component Value Date    K 3.6 12/28/2019    K 4.8 04/03/2018     12/28/2019     04/03/2018    CO2 29 12/28/2019    CO2 24 04/03/2018    BUN 5 12/28/2019    BUN 7 04/03/2018    CREATININE 0.71 12/28/2019    CALCIUM 8.7 12/28/2019     Lab Results   Component Value Date    WBC 4.08 (L) 12/28/2019    HGB 13.4 12/28/2019    HCT 42.1 12/28/2019    MCV 84 12/28/2019     12/28/2019       Peak flow rate:  210 l/m  predicted PF  400 l/m

## 2023-10-30 NOTE — ASSESSMENT & PLAN NOTE
Joy Navarrete states she has gained 50 pounds over the last year or so. She does have history of depression and thinks this may partly be be related to this. I did talked about weight management referral and she was agreeable to this. She denies any excessive daytime somnolence or loud snoring.   I did encourage her to try to watch her diet and lose weight

## 2023-10-30 NOTE — PATIENT INSTRUCTIONS
Start air duo 113 mcg 1 puff twice a day and rinse mouth after using    Use albuterol 2.5 mg 1 vial in nebulizer every 4 hours as needed your albuterol inhaler    Referral to weight management    Monitor peak flow rates 2-3 times a week and detected is about 400.   Best today was 210 L/min    If your asthma starts to act up and if you need prednisone call our office    [facility]   [phone]

## 2023-10-30 NOTE — ASSESSMENT & PLAN NOTE
Emmy Schaumann was seen in the ER on October 28 for acute exacerbation of asthma. She was prescribed prednisone and this has helped. Her breathing is improved. No wheezing on examination today. Peak flow rate is diminished to 10 L/min with predicted being 400 L/min. I did give her a peak flow meter. Not been on any type of steroid maintenance inhaler for several years. I did order Airduo 305 mcg respiclick she will use 1 puff twice a day. I did recommend she monitor her peak flow rates 2 to 3 times a week    I also placed order for albuterol 2.5 mg for her nebulizer and rescue albuterol inhaler. He requested mask circuit for her nebulizer and I gave her 1 of these. Follow-up in 2 months. I told if she has any problems before then to contact our office.

## 2024-02-06 ENCOUNTER — OFFICE VISIT (OUTPATIENT)
Dept: PULMONOLOGY | Facility: MEDICAL CENTER | Age: 29
End: 2024-02-06
Payer: COMMERCIAL

## 2024-02-06 VITALS
SYSTOLIC BLOOD PRESSURE: 110 MMHG | TEMPERATURE: 97.6 F | HEART RATE: 84 BPM | DIASTOLIC BLOOD PRESSURE: 80 MMHG | RESPIRATION RATE: 12 BRPM | HEIGHT: 62 IN | OXYGEN SATURATION: 97 % | WEIGHT: 191 LBS | BODY MASS INDEX: 35.15 KG/M2

## 2024-02-06 DIAGNOSIS — J30.1 SEASONAL ALLERGIC RHINITIS DUE TO POLLEN: ICD-10-CM

## 2024-02-06 DIAGNOSIS — E66.09 CLASS 1 OBESITY DUE TO EXCESS CALORIES WITHOUT SERIOUS COMORBIDITY WITH BODY MASS INDEX (BMI) OF 34.0 TO 34.9 IN ADULT: ICD-10-CM

## 2024-02-06 DIAGNOSIS — J45.30 MILD PERSISTENT ASTHMA WITHOUT COMPLICATION: Primary | ICD-10-CM

## 2024-02-06 PROCEDURE — 99214 OFFICE O/P EST MOD 30 MIN: CPT | Performed by: NURSE PRACTITIONER

## 2024-02-06 RX ORDER — ALBUTEROL SULFATE 90 UG/1
2 AEROSOL, METERED RESPIRATORY (INHALATION) EVERY 4 HOURS PRN
Start: 2024-02-06

## 2024-02-06 NOTE — ASSESSMENT & PLAN NOTE
I encouraged diligent use of AirDuo with 1 puff twice daily.  She is aware of proper use and technique.  I did explain maintenance therapy versus rescue therapy with the albuterol MDI/nebulizer.  We did also discuss symptoms for which to use her rescue therapy.  She will increase the use of AirDuo to hopefully decrease the use of her rescue medications.

## 2024-02-06 NOTE — PROGRESS NOTES
"Pulmonary Follow-Up Note   Teresa Bear 28 y.o. female MRN: [id number]  2/6/2024      Assessment/Plan:    Problem List Items Addressed This Visit          Respiratory    Mild persistent asthma without complication - Primary     I encouraged diligent use of AirDuo with 1 puff twice daily.  She is aware of proper use and technique.  I did explain maintenance therapy versus rescue therapy with the albuterol MDI/nebulizer.  We did also discuss symptoms for which to use her rescue therapy.  She will increase the use of AirDuo to hopefully decrease the use of her rescue medications.         Relevant Medications    albuterol (Ventolin HFA) 90 mcg/act inhaler    Seasonal allergic rhinitis due to pollen     She is no longer on Flonase.  I did offer to trial a different nasal spray however she declined.            Other    Class 1 obesity due to excess calories without serious comorbidity with body mass index (BMI) of 34.0 to 34.9 in adult     Lifestyle modifications and weight loss as able.            Return in about 6 months (around 8/6/2024), or if symptoms worsen or fail to improve.    All of Teresa's questions were answered prior to leaving the office today. She will follow-up in six months or sooner should the need arise. She is aware to call our office with any further questions or concerns.    History of Present Illness   Reason for Visit: Follow-up  Chief Complaint: \"I feel better.\"  HPI: Teresa Bear is a 28 y.o. female who presents to the office today for follow-up of asthma.  Overall, she reports she is doing much better since she saw Dr. Santiago a few months ago.  She was started on AirDuo after having an ER visit for asthma exacerbation.  She reports that she does have some intermittent shortness of breath, wheezing, and rhinitis/sneezing with postnasal drip.  Her symptoms typically occur when she is working as a .  She uses her albuterol MDI once daily while at work.  She forgets to " use the AirDuo frequently and uses it approximately twice weekly.  She does also use her nebulizer with albuterol approximately twice weekly.  She was on Flonase for her nasal symptoms; however, did not perceive any benefit and self discontinued.    Review of Systems   Constitutional:  Negative for appetite change and fever.   HENT:  Positive for sneezing. Negative for ear pain, postnasal drip, rhinorrhea, sore throat and trouble swallowing.    Cardiovascular:  Negative for chest pain.   Musculoskeletal:  Negative for myalgias.   Neurological:  Negative for headaches.   All other systems reviewed and are negative.  A full 12-point review of systems was completed and is negative except for those outlined in the HPI.    Historical Information   Past Medical History:   Diagnosis Date    Asthma     Eczema     Vitiligo      Past Surgical History:   Procedure Laterality Date     SECTION           Family History   Problem Relation Age of Onset    Eczema Mother     Eczema Maternal Grandfather     Asthma Maternal Grandfather      Social History   Social History     Substance and Sexual Activity   Alcohol Use Yes    Comment: occasional     Social History     Substance and Sexual Activity   Drug Use Not Currently    Frequency: 1.0 times per week    Types: Marijuana     Social History     Tobacco Use   Smoking Status Former    Average packs/day: (0.1 ttl pk-yrs)    Types: Cigars, Cigarettes    Start date:    Smokeless Tobacco Never   Tobacco Comments    Report smokes a weekly cigar     E-Cigarette/Vaping    E-Cigarette Use Former User      E-Cigarette/Vaping Substances    Nicotine No     THC No     CBD No     Flavoring No     Other No     Unknown No        Meds/Allergies     Current Outpatient Medications:     albuterol (2.5 mg/3 mL) 0.083 % nebulizer solution, Take 3 mL (2.5 mg total) by nebulization every 4 (four) hours as needed for wheezing or shortness of breath, Disp: 360 mL, Rfl: 07    albuterol  "(Ventolin HFA) 90 mcg/act inhaler, Inhale 2 puffs every 4 (four) hours as needed for wheezing, Disp: , Rfl:     EPINEPHrine (EPIPEN) 0.3 mg/0.3 mL SOAJ, inject 0.3 milliliters ( 0.3 milligrams ) intramuscularly in MIDD...  (REFER TO PRESCRIPTION NOTES)., Disp: , Rfl:     fluticasone-salmeterol (AirDuo RespiClick 113/14) 113-14 mcg/act dry powder inhaler, Inhale 1 puff 2 (two) times a day Rinse mouth after use., Disp: 1 each, Rfl: 11  Allergies   Allergen Reactions    Peanuts [Peanut Oil - Food Allergy] Itching       Vitals: Blood pressure 110/80, pulse 84, temperature 97.6 °F (36.4 °C), temperature source Temporal, resp. rate 12, height 5' 2\" (1.575 m), weight 86.6 kg (191 lb), SpO2 97%, not currently breastfeeding. Body mass index is 34.93 kg/m². Oxygen Therapy  SpO2: 97 %    Physical Exam:  Physical Exam  Vitals reviewed.   Constitutional:       General: She is not in acute distress.     Appearance: She is well-developed. She is obese. She is not toxic-appearing or diaphoretic.   HENT:      Head: Normocephalic and atraumatic.   Eyes:      General: No scleral icterus.     Extraocular Movements: Extraocular movements intact.   Neck:      Trachea: No tracheal deviation.   Cardiovascular:      Rate and Rhythm: Normal rate and regular rhythm.      Heart sounds: S1 normal and S2 normal. No murmur heard.     No friction rub. No gallop.   Pulmonary:      Effort: Pulmonary effort is normal. No tachypnea, accessory muscle usage or respiratory distress.      Breath sounds: Normal breath sounds. No stridor. No decreased breath sounds, wheezing, rhonchi or rales.   Chest:      Chest wall: No tenderness.   Abdominal:      General: Bowel sounds are normal. There is no distension.      Palpations: Abdomen is soft.      Tenderness: There is no abdominal tenderness.   Musculoskeletal:      Cervical back: Neck supple.      Right lower leg: No edema.      Left lower leg: No edema.   Skin:     General: Skin is warm and dry.      " "Findings: No rash.   Neurological:      Mental Status: She is alert and oriented to person, place, and time.      GCS: GCS eye subscore is 4. GCS verbal subscore is 5. GCS motor subscore is 6.   Psychiatric:         Speech: Speech normal.         Behavior: Behavior normal. Behavior is cooperative.       No new labs or diagnostic testing      NESTOR Recinos  [facility]        Portions of the record may have been created with voice recognition software.  Occasional wrong word or \"sound a like\" substitutions may have occurred due to the inherent limitations of voice recognition software.  Read the chart carefully and recognize, using context, where substitutions have occurred or contact the dictating provider.    Answers submitted by the patient for this visit:  Pulmonology Questionnaire (Submitted on 1/30/2024)  Chief Complaint: Primary symptoms  Do you have shortness of breath that occurs with effort or exertion?: No  Do you have ear congestion?: No  Do you have heartburn?: No  Do you have fatigue?: No  Do you have nasal congestion?: Yes  Do you have shortness of breath when lying flat?: No  Do you have shortness of breath when you wake up?: No  Do you have sweats?: No  Have you experienced weight loss?: No  Which of the following makes your symptoms worse?: change in weather, pollen    "

## 2024-02-21 PROBLEM — L01.00 IMPETIGO: Status: RESOLVED | Noted: 2018-09-19 | Resolved: 2024-02-21

## 2024-04-03 DIAGNOSIS — J45.30 MILD PERSISTENT ASTHMA WITHOUT COMPLICATION: ICD-10-CM

## 2024-04-03 RX ORDER — ALBUTEROL SULFATE 90 UG/1
2 AEROSOL, METERED RESPIRATORY (INHALATION) EVERY 4 HOURS PRN
Qty: 8.5 G | Refills: 5 | Status: SHIPPED | OUTPATIENT
Start: 2024-04-03

## 2024-04-03 NOTE — TELEPHONE ENCOUNTER
Reason for call:   [x] Refill   [] Prior Auth  [] Other:     Office:   [] PCP/Provider -   [x] Specialty/Provider -     Medication: albuterol (Ventolin HFA) 90 mcg/act inhaler     Dose/Frequency: 2 puff   Inhale 2 puffs every 4 (four) hours as needed for wheezing      Pharmacy: [facility]    Does the patient have enough for 3 days?   [] Yes   [x] No - Send as HP to POD

## 2024-07-20 ENCOUNTER — APPOINTMENT (EMERGENCY)
Dept: RADIOLOGY | Facility: HOSPITAL | Age: 29
End: 2024-07-20
Payer: COMMERCIAL

## 2024-07-20 ENCOUNTER — HOSPITAL ENCOUNTER (EMERGENCY)
Facility: HOSPITAL | Age: 29
Discharge: HOME | End: 2024-07-20
Attending: EMERGENCY MEDICINE
Payer: COMMERCIAL

## 2024-07-20 VITALS
TEMPERATURE: 98.1 F | DIASTOLIC BLOOD PRESSURE: 50 MMHG | OXYGEN SATURATION: 100 % | HEART RATE: 60 BPM | RESPIRATION RATE: 15 BRPM | SYSTOLIC BLOOD PRESSURE: 116 MMHG

## 2024-07-20 DIAGNOSIS — R42 LIGHTHEADEDNESS: Primary | ICD-10-CM

## 2024-07-20 DIAGNOSIS — T67.5XXA HEAT EXHAUSTION, INITIAL ENCOUNTER: ICD-10-CM

## 2024-07-20 LAB
ALBUMIN SERPL BCG-MCNC: 3.9 G/DL (ref 3.5–5)
ALP SERPL-CCNC: 46 U/L (ref 34–104)
ALT SERPL W P-5'-P-CCNC: 11 U/L (ref 7–52)
ANION GAP SERPL CALCULATED.3IONS-SCNC: 5 MMOL/L (ref 4–13)
AST SERPL W P-5'-P-CCNC: 18 U/L (ref 13–39)
BASOPHILS # BLD AUTO: 0.03 THOUSANDS/ÂΜL (ref 0–0.1)
BASOPHILS NFR BLD AUTO: 0 % (ref 0–1)
BILIRUB SERPL-MCNC: 0.67 MG/DL (ref 0.2–1)
BUN SERPL-MCNC: 12 MG/DL (ref 5–25)
CALCIUM SERPL-MCNC: 8.8 MG/DL (ref 8.4–10.2)
CHLORIDE SERPL-SCNC: 107 MMOL/L (ref 96–108)
CO2 SERPL-SCNC: 25 MMOL/L (ref 21–32)
CREAT SERPL-MCNC: 0.8 MG/DL (ref 0.6–1.3)
CRP SERPL HS-MCNC: <0.2 MG/L
EOSINOPHIL # BLD AUTO: 0.27 THOUSAND/ÂΜL (ref 0–0.61)
EOSINOPHIL NFR BLD AUTO: 4 % (ref 0–6)
ERYTHROCYTE [DISTWIDTH] IN BLOOD BY AUTOMATED COUNT: 13.1 % (ref 11.6–15.1)
FLUAV RNA RESP QL NAA+PROBE: NEGATIVE
FLUBV RNA RESP QL NAA+PROBE: NEGATIVE
GFR SERPL CREATININE-BSD FRML MDRD: 100 ML/MIN/1.73SQ M
GLUCOSE SERPL-MCNC: 90 MG/DL (ref 65–140)
HCT VFR BLD AUTO: 39.4 % (ref 34.8–46.1)
HGB BLD-MCNC: 12.5 G/DL (ref 11.5–15.4)
IMM GRANULOCYTES # BLD AUTO: 0.01 THOUSAND/UL (ref 0–0.2)
IMM GRANULOCYTES NFR BLD AUTO: 0 % (ref 0–2)
LYMPHOCYTES # BLD AUTO: 1.87 THOUSANDS/ÂΜL (ref 0.6–4.47)
LYMPHOCYTES NFR BLD AUTO: 28 % (ref 14–44)
MAGNESIUM SERPL-MCNC: 1.8 MG/DL (ref 1.9–2.7)
MCH RBC QN AUTO: 25.9 PG (ref 26.8–34.3)
MCHC RBC AUTO-ENTMCNC: 31.7 G/DL (ref 31.4–37.4)
MCV RBC AUTO: 82 FL (ref 82–98)
MONOCYTES # BLD AUTO: 0.5 THOUSAND/ÂΜL (ref 0.17–1.22)
MONOCYTES NFR BLD AUTO: 8 % (ref 4–12)
NEUTROPHILS # BLD AUTO: 3.99 THOUSANDS/ÂΜL (ref 1.85–7.62)
NEUTS SEG NFR BLD AUTO: 60 % (ref 43–75)
NRBC BLD AUTO-RTO: 0 /100 WBCS
PLATELET # BLD AUTO: 245 THOUSANDS/UL (ref 149–390)
PMV BLD AUTO: 10.7 FL (ref 8.9–12.7)
POTASSIUM SERPL-SCNC: 3.5 MMOL/L (ref 3.5–5.3)
PROT SERPL-MCNC: 6.9 G/DL (ref 6.4–8.4)
RBC # BLD AUTO: 4.82 MILLION/UL (ref 3.81–5.12)
RSV RNA RESP QL NAA+PROBE: NEGATIVE
SARS-COV-2 RNA RESP QL NAA+PROBE: NEGATIVE
SODIUM SERPL-SCNC: 137 MMOL/L (ref 135–147)
T4 FREE SERPL-MCNC: 0.89 NG/DL (ref 0.61–1.12)
TSH SERPL DL<=0.05 MIU/L-ACNC: 0.42 UIU/ML (ref 0.45–4.5)
WBC # BLD AUTO: 6.67 THOUSAND/UL (ref 4.31–10.16)

## 2024-07-20 PROCEDURE — 96361 HYDRATE IV INFUSION ADD-ON: CPT

## 2024-07-20 PROCEDURE — 86141 C-REACTIVE PROTEIN HS: CPT | Performed by: EMERGENCY MEDICINE

## 2024-07-20 PROCEDURE — 83735 ASSAY OF MAGNESIUM: CPT | Performed by: EMERGENCY MEDICINE

## 2024-07-20 PROCEDURE — 80053 COMPREHEN METABOLIC PANEL: CPT | Performed by: EMERGENCY MEDICINE

## 2024-07-20 PROCEDURE — 84443 ASSAY THYROID STIM HORMONE: CPT | Performed by: EMERGENCY MEDICINE

## 2024-07-20 PROCEDURE — 85025 COMPLETE CBC W/AUTO DIFF WBC: CPT | Performed by: EMERGENCY MEDICINE

## 2024-07-20 PROCEDURE — 0241U HB NFCT DS VIR RESP RNA 4 TRGT: CPT | Performed by: EMERGENCY MEDICINE

## 2024-07-20 PROCEDURE — 71045 X-RAY EXAM CHEST 1 VIEW: CPT

## 2024-07-20 PROCEDURE — 99285 EMERGENCY DEPT VISIT HI MDM: CPT | Performed by: EMERGENCY MEDICINE

## 2024-07-20 PROCEDURE — 70450 CT HEAD/BRAIN W/O DYE: CPT

## 2024-07-20 PROCEDURE — 96365 THER/PROPH/DIAG IV INF INIT: CPT

## 2024-07-20 PROCEDURE — 99284 EMERGENCY DEPT VISIT MOD MDM: CPT

## 2024-07-20 PROCEDURE — 84439 ASSAY OF FREE THYROXINE: CPT | Performed by: EMERGENCY MEDICINE

## 2024-07-20 PROCEDURE — 36415 COLL VENOUS BLD VENIPUNCTURE: CPT | Performed by: EMERGENCY MEDICINE

## 2024-07-20 PROCEDURE — 93005 ELECTROCARDIOGRAM TRACING: CPT

## 2024-07-20 RX ORDER — MAGNESIUM SULFATE HEPTAHYDRATE 40 MG/ML
2 INJECTION, SOLUTION INTRAVENOUS ONCE
Status: COMPLETED | OUTPATIENT
Start: 2024-07-20 | End: 2024-07-20

## 2024-07-20 RX ORDER — ONDANSETRON 4 MG/1
4 TABLET, ORALLY DISINTEGRATING ORAL EVERY 6 HOURS PRN
Qty: 12 TABLET | Refills: 0 | Status: SHIPPED | OUTPATIENT
Start: 2024-07-20

## 2024-07-20 RX ADMIN — MAGNESIUM SULFATE HEPTAHYDRATE 2 G: 40 INJECTION, SOLUTION INTRAVENOUS at 21:19

## 2024-07-20 RX ADMIN — SODIUM CHLORIDE 1000 ML: 0.9 INJECTION, SOLUTION INTRAVENOUS at 20:20

## 2024-07-20 NOTE — Clinical Note
Teresa Bear was seen and treated in our emergency department on 7/20/2024.                Diagnosis:     Teresa  .    She may return on this date: 07/22/2024         If you have any questions or concerns, please don't hesitate to call.      Herman Downing RN    ______________________________           _______________          _______________  Hospital Representative                              Date                                Time

## 2024-07-21 LAB
ATRIAL RATE: 56 BPM
P AXIS: 28 DEGREES
PR INTERVAL: 174 MS
QRS AXIS: 68 DEGREES
QRSD INTERVAL: 80 MS
QT INTERVAL: 418 MS
QTC INTERVAL: 403 MS
T WAVE AXIS: 43 DEGREES
VENTRICULAR RATE: 56 BPM

## 2024-07-21 PROCEDURE — 93010 ELECTROCARDIOGRAM REPORT: CPT | Performed by: INTERNAL MEDICINE

## 2024-07-21 NOTE — ED PROVIDER NOTES
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      29-year-old female with past history of asthma, eczema, vitiligo, obesity, presents to the ED for evaluation of feeling lightheaded while at work.  Patient states she works as a .  Patient drives a mail truck daily.  Patient states she does not think she is hydrating herself well enough in this heat wave.  Today patient felt lightheaded with 1 episode of nausea and vomiting at work.  Subsequently patient was sent to the ED for further evaluation management.  Patient thinks she may be dehydrated.  Patient denies any syncope or full LOC.      Syncope      Prior to Admission Medications   Prescriptions Last Dose Informant Patient Reported? Taking?   EPINEPHrine (EPIPEN) 0.3 mg/0.3 mL SOAJ   Yes No   Sig: inject 0.3 milliliters ( 0.3 milligrams ) intramuscularly in MIDD...  (REFER TO PRESCRIPTION NOTES).   albuterol (2.5 mg/3 mL) 0.083 % nebulizer solution   No No   Sig: Take 3 mL (2.5 mg total) by nebulization every 4 (four) hours as needed for wheezing or shortness of breath   albuterol (Ventolin HFA) 90 mcg/act inhaler   No No   Sig: Inhale 2 puffs every 4 (four) hours as needed for wheezing   fluticasone-salmeterol (AirDuo RespiClick 113/14) 113-14 mcg/act dry powder inhaler   No No   Sig: Inhale 1 puff 2 (two) times a day Rinse mouth after use.      Facility-Administered Medications: None       Past Medical History:   Diagnosis Date    Asthma     Eczema     Vitiligo        Past Surgical History:   Procedure Laterality Date     SECTION             Family History   Problem Relation Age of Onset    Eczema Mother     Eczema Maternal Grandfather     Asthma Maternal Grandfather      I have reviewed and agree with the history as documented.    E-Cigarette/Vaping     E-Cigarette Use Former User      E-Cigarette/Vaping Substances    Nicotine No     THC No     CBD No     Flavoring No     Other No     Unknown No      Social History     Tobacco Use    Smoking status: Former     Average packs/day: (0.1 ttl pk-yrs)     Types: Cigars, Cigarettes     Start date: 2019    Smokeless tobacco: Never    Tobacco comments:     Report smokes a weekly cigar   Vaping Use    Vaping status: Former   Substance Use Topics    Alcohol use: Yes     Comment: occasional    Drug use: Not Currently     Frequency: 1.0 times per week     Types: Marijuana       Review of Systems   Cardiovascular:  Positive for syncope.       Physical Exam  Physical Exam    Vital Signs  ED Triage Vitals [07/20/24 2004]   Temp Pulse Respirations Blood Pressure SpO2   -- 76 16 149/61 96 %      Temp src Heart Rate Source Patient Position - Orthostatic VS BP Location FiO2 (%)   -- Monitor Lying Left arm --      Pain Score       --           Vitals:    07/20/24 2004 07/20/24 2034 07/20/24 2037   BP: 149/61 118/59 116/57   Pulse: 76 72 73   Patient Position - Orthostatic VS: Lying Sitting - Orthostatic VS Standing - Orthostatic VS         Visual Acuity  Visual Acuity      Flowsheet Row Most Recent Value   L Pupil Size (mm) 3   R Pupil Size (mm) 3            ED Medications  Medications   sodium chloride 0.9 % bolus 1,000 mL (0 mL Intravenous Stopped 7/20/24 2120)   magnesium sulfate 2 g/50 mL IVPB (premix) 2 g (0 g Intravenous Stopped 7/20/24 2149)       Diagnostic Studies  Results Reviewed       Procedure Component Value Units Date/Time    FLU/RSV/COVID - if FLU/RSV clinically relevant [[id number]]  (Normal) Collected: 07/20/24 2016    Lab Status: Final result Specimen: Nares from Nose Updated: 07/20/24 2103     SARS-CoV-2 Negative     INFLUENZA A PCR Negative     INFLUENZA B PCR Negative     RSV PCR Negative    Narrative:      FOR PEDIATRIC PATIENTS - copy/paste COVID Guidelines URL to browser:  [email or web address]    SARS-CoV-2 assay is a Nucleic Acid Amplification assay intended for the  qualitative detection of nucleic acid from SARS-CoV-2 in nasopharyngeal  swabs. Results are for the presumptive identification of SARS-CoV-2 RNA.    Positive results are indicative of infection with SARS-CoV-2, the virus  causing COVID-19, but do not rule out bacterial infection or co-infection  with other viruses. Laboratories within the [place] and its  territories are required to report all positive results to the appropriate  public health authorities. Negative results do not preclude SARS-CoV-2  infection and should not be used as the sole basis for treatment or other  patient management decisions. Negative results must be combined with  clinical observations, patient history, and epidemiological information.  This test has not been FDA cleared or approved.    This test has been authorized by FDA under an Emergency Use Authorization  (EUA). This test is only authorized for the duration of time the  declaration that circumstances exist justifying the authorization of the  emergency use of an in vitro diagnostic tests for detection of SARS-CoV-2  virus and/or diagnosis of COVID-19 infection under section 564(b)(1) of  the Act, 21 U.S.C. 360bbb-3(b)(1), unless the authorization is terminated  or revoked sooner. The test has been validated but independent review by FDA  and CLIA is pending.    Test performed using [employer] GeneXpert: This RT-PCR assay targets N2,  a region unique to SARS-CoV-2. A conserved region in the E-gene was chosen  for pan-Sarbecovirus detection which includes SARS-CoV-2.    According to CMS-2020-01-R, this platform meets the definition of high-throughput technology.    TSH, 3rd generation with Free T4 reflex [[id number]]  (Abnormal) Collected: 07/20/24 2016    Lab Status: Final result Specimen: Blood from Arm, Right Updated: 07/20/24 2101      TSH 3RD GENERATON 0.418 uIU/mL     T4, free [[id number]] Collected: 07/20/24 2016    Lab Status: In process Specimen: Blood from Arm, Right Updated: 07/20/24 2100    Comprehensive metabolic panel [[id number]] Collected: 07/20/24 2016    Lab Status: Final result Specimen: Blood from Arm, Right Updated: 07/20/24 2046     Sodium 137 mmol/L      Potassium 3.5 mmol/L      Chloride 107 mmol/L      CO2 25 mmol/L      ANION GAP 5 mmol/L      BUN 12 mg/dL      Creatinine 0.80 mg/dL      Glucose 90 mg/dL      Calcium 8.8 mg/dL      AST 18 U/L      ALT 11 U/L      Alkaline Phosphatase 46 U/L      Total Protein 6.9 g/dL      Albumin 3.9 g/dL      Total Bilirubin 0.67 mg/dL      eGFR 100 ml/min/1.73sq m     Narrative:      National Kidney Disease Foundation guidelines for Chronic Kidney Disease (CKD):     Stage 1 with normal or high GFR (GFR > 90 mL/min/1.73 square meters)    Stage 2 Mild CKD (GFR = 60-89 mL/min/1.73 square meters)    Stage 3A Moderate CKD (GFR = 45-59 mL/min/1.73 square meters)    Stage 3B Moderate CKD (GFR = 30-44 mL/min/1.73 square meters)    Stage 4 Severe CKD (GFR = 15-29 mL/min/1.73 square meters)    Stage 5 End Stage CKD (GFR <15 mL/min/1.73 square meters)  Note: GFR calculation is accurate only with a steady state creatinine    Magnesium [[id number]]  (Abnormal) Collected: 07/20/24 2016    Lab Status: Final result Specimen: Blood from Arm, Right Updated: 07/20/24 2046     Magnesium 1.8 mg/dL     CBC and differential [[id number]]  (Abnormal) Collected: 07/20/24 2016    Lab Status: Final result Specimen: Blood from Arm, Right Updated: 07/20/24 2027     WBC 6.67 Thousand/uL      RBC 4.82 Million/uL      Hemoglobin 12.5 g/dL      Hematocrit 39.4 %      MCV 82 fL      MCH 25.9 pg      MCHC 31.7 g/dL      RDW 13.1 %      MPV 10.7 fL      Platelets 245 Thousands/uL      nRBC 0 /100 WBCs      Segmented % 60 %      Immature Grans % 0 %      Lymphocytes % 28 %      Monocytes % 8 %      Eosinophils Relative 4 %       Basophils Relative 0 %      Absolute Neutrophils 3.99 Thousands/µL      Absolute Immature Grans 0.01 Thousand/uL      Absolute Lymphocytes 1.87 Thousands/µL      Absolute Monocytes 0.50 Thousand/µL      Eosinophils Absolute 0.27 Thousand/µL      Basophils Absolute 0.03 Thousands/µL     High sensitivity CRP [[id number]] Collected: 07/20/24 2016    Lab Status: In process Specimen: Blood from Arm, Right Updated: 07/20/24 2024    POCT pregnancy, urine [[id number]]     Lab Status: No result     UA w Reflex to Microscopic w Reflex to Culture [[id number]]     Lab Status: No result Specimen: Urine     Rapid drug screen, urine [[id number]]     Lab Status: No result Specimen: Urine                    CT head without contrast   Final Result by Vasu Benz MD (07/20 2132)      No acute intracranial abnormality.                  Workstation performed: [id number]         XR chest 1 view portable   ED Interpretation by Blair Ndiaye DO (07/20 2141)   No acute abnormalities noted.  Formal radiology reading pending.      Final Result by Sal Hood MD (07/20 2157)      No acute cardiopulmonary disease.            Workstation performed: [id number]                    Procedures  ECG 12 Lead Documentation Only    Date/Time: 7/20/2024 10:12 PM    Performed by: Blair Ndiaye DO  Authorized by: Blair Ndiaye DO    Indications / Diagnosis:  Lightheadedness  ECG reviewed by me, the ED Provider: yes    Patient location:  ED  Previous ECG:     Previous ECG:  Unavailable    Comparison to cardiac monitor: Yes    Interpretation:     Interpretation: abnormal    Comments:      Sinus rhythm, rate 56, normal axis, normal intervals, no acute ST/T wave aneurysm, sinus bradycardia noted, otherwise unremarkable EKG, no previous EKG available for comparison.           ED Course  ED Course as of 07/20/24 2214   Sat Jul 20, 2024 2140 Orthostatic vital signs are unremarkable.   2159 Patient reexamined at bedside.  Patient feels much  better after IV fluid hydration.  Patient was then able to ambulate and no longer felt lightheaded.  Patient is requesting to eat something.                                               Medical Decision Making  Oculomotor, orthostatic vital signs, EKG, chest x-ray, CT head  Give IV fluids, magnesium and continue to monitor patient for any worsening symptoms    Lab and radiology results were essentially unremarkable.  Magnesium was a little low and was repleted.  Patient felt better after IV fluid hydration.  Patient then ambulated without any sensation of feeling lightheaded.  Patient no longer felt nauseous.  Patient tolerated p.o. without any emesis.  At this time patient's symptoms are most likely secondary to heat exhaustion.  I discussed adequate hydration and staying cool in the hot weather outside as patient is a .  At this time patient is discharged home with as needed Zofran.  Patient to follow-up with PCP, neurology, and cardiology for any worsening symptoms.  Close return instructions given to return to the ER for any worsening symptoms.  Patient agrees with discharge plan.  Patient well appearing at time of discharge.    Please Note: Flu[employer] voice recognition software may have been used in the creation of this document. Wrong words or sound a like substitutions may have occurred due to the inherent limitations of the voice software.         Amount and/or Complexity of Data Reviewed  Labs: ordered.  Radiology: ordered and independent interpretation performed. Decision-making details documented in ED Course.  ECG/medicine tests: ordered and independent interpretation performed. Decision-making details documented in ED Course.    Risk  Prescription drug management.                 Disposition  Final diagnoses:   Lightheadedness   Heat exhaustion, initial encounter     Time reflects when diagnosis was documented in both MDM as applicable and the Disposition within this note       Time  User Action Codes Description Comment    7/20/2024 10:10 PM Blair Ndiaye [R42] Lightheadedness     7/20/2024 10:10 PM Blair Ndiaye Add [T67.5XXA] Heat exhaustion, initial encounter           ED Disposition       ED Disposition   Discharge    Condition   Stable    Date/Time   Sat Jul 20, 2024 10:05 PM    Comment   Teresa FRANCES Bear discharge to home/self care.                   Follow-up Information       Follow up With Specialties Details Why Contact Info Additional Information    [facility] Schedule an appointment as soon as possible for a visit in 2 days  [address] [place] [address]  [address]  [facility] [address]  [phone]       [facility] Schedule an appointment as soon as possible for a visit   [address]  [facility] [id number] [phone] [facility], [address] [place] [address] [address], [place], [place], [id number] [phone]    [facility] Schedule an appointment as soon as possible for a visit   [address] [place] [id number] [phone]               Patient's Medications   Discharge Prescriptions    ONDANSETRON (ZOFRAN-ODT) 4 MG DISINTEGRATING TABLET    Take 1 tablet (4 mg total) by mouth every 6 (six) hours as needed for nausea or vomiting       Start Date: 7/20/2024 End Date: --       Order Dose: 4 mg       Quantity: 12 tablet    Refills: 0       No discharge procedures on file.    PDMP Review       None            ED Provider  Electronically Signed by             Blair Ndiaye DO  07/20/24 5210

## 2024-07-25 ENCOUNTER — OFFICE VISIT (OUTPATIENT)
Dept: URGENT CARE | Facility: CLINIC | Age: 29
End: 2024-07-25
Payer: COMMERCIAL

## 2024-07-25 VITALS
DIASTOLIC BLOOD PRESSURE: 71 MMHG | OXYGEN SATURATION: 98 % | BODY MASS INDEX: 35.48 KG/M2 | WEIGHT: 194 LBS | RESPIRATION RATE: 16 BRPM | SYSTOLIC BLOOD PRESSURE: 118 MMHG | HEART RATE: 65 BPM | TEMPERATURE: 97.5 F

## 2024-07-25 DIAGNOSIS — H11.31 SUBCONJUNCTIVAL HEMORRHAGE OF RIGHT EYE: Primary | ICD-10-CM

## 2024-07-25 PROCEDURE — 99213 OFFICE O/P EST LOW 20 MIN: CPT | Performed by: FAMILY MEDICINE

## 2024-07-25 NOTE — PROGRESS NOTES
[facility]        NAME: Teresa Bear is a 29 y.o. female  : 1995    MRN: [id number]  DATE: 2024  TIME: 3:12 PM    Assessment and Plan   Subconjunctival hemorrhage of right eye [H11.31]  1. Subconjunctival hemorrhage of right eye              Patient Instructions     A subconjunctival hemorrhage is a benign eye condition due to a forceful increase in pressure such as vomiting or lifting heavy objects. Symptoms should be resolved in 1-2 weeks.  Follow up with PCP in 3-5 days.  Proceed to  ER if symptoms worsen.    If tests have been performed at [facility], our office will contact you with results if changes need to be made to the care plan discussed with you at the visit.  [facility]    Chief Complaint     Chief Complaint   Patient presents with   • Eye Problem     Pt c/o redness to right eye that started after multiple vomiting episodes over the weekend.         History of Present Illness       Teresa Bear is a 28 yo female presenting today with eye redness x 2 days. She states she was vomiting on Saturday and woke up on Tuesday and noticed her eye was red. Since then she notes the redness has been spreading. She denies any pain, blurry vision, or photophobia.        Review of Systems   Review of Systems   Constitutional:  Negative for chills and fever.   HENT:  Negative for congestion, ear pain, postnasal drip, sinus pain and sore throat.    Eyes:  Positive for redness. Negative for photophobia, pain, itching and visual disturbance.   Respiratory:  Negative for cough, shortness of breath and wheezing.    Cardiovascular:  Negative for chest pain and palpitations.   Gastrointestinal:  Negative for abdominal pain, constipation, diarrhea, nausea and vomiting.   Genitourinary:  Negative for difficulty urinating and hematuria.   Musculoskeletal:  Negative for arthralgias and myalgias.   Skin:  Negative for rash.   Neurological:  Negative for  dizziness, light-headedness and headaches.   Psychiatric/Behavioral:  Negative for agitation and sleep disturbance. The patient is not nervous/anxious.          Current Medications       Current Outpatient Medications:   •  albuterol (2.5 mg/3 mL) 0.083 % nebulizer solution, Take 3 mL (2.5 mg total) by nebulization every 4 (four) hours as needed for wheezing or shortness of breath, Disp: 360 mL, Rfl: 07  •  albuterol (Ventolin HFA) 90 mcg/act inhaler, Inhale 2 puffs every 4 (four) hours as needed for wheezing, Disp: 8.5 g, Rfl: 5  •  EPINEPHrine (EPIPEN) 0.3 mg/0.3 mL SOAJ, inject 0.3 milliliters ( 0.3 milligrams ) intramuscularly in MIDD...  (REFER TO PRESCRIPTION NOTES)., Disp: , Rfl:   •  fluticasone-salmeterol (AirDuo RespiClick 113/14) 113-14 mcg/act dry powder inhaler, Inhale 1 puff 2 (two) times a day Rinse mouth after use., Disp: 1 each, Rfl: 11  •  ondansetron (ZOFRAN-ODT) 4 mg disintegrating tablet, Take 1 tablet (4 mg total) by mouth every 6 (six) hours as needed for nausea or vomiting, Disp: 12 tablet, Rfl: 0    Current Allergies     Allergies as of 2024 - Reviewed 2024   Allergen Reaction Noted   • Peanuts [peanut oil - food allergy] Itching 2016            The following portions of the patient's history were reviewed and updated as appropriate: allergies, current medications, past family history, past medical history, past social history, past surgical history and problem list.     Past Medical History:   Diagnosis Date   • Asthma    • Eczema    • Vitiligo        Past Surgical History:   Procedure Laterality Date   •  SECTION             Family History   Problem Relation Age of Onset   • Eczema Mother    • Eczema Maternal Grandfather    • Asthma Maternal Grandfather          Medications have been verified.        Objective   /71   Pulse 65   Temp 97.5 °F (36.4 °C) (Temporal)   Resp 16   Wt 88 kg (194 lb)   SpO2 98%   BMI 35.48 kg/m²   No LMP  recorded.       Physical Exam     Physical Exam  Vitals reviewed.   Constitutional:       General: She is not in acute distress.     Appearance: Normal appearance.   HENT:      Head: Normocephalic and atraumatic.      Mouth/Throat:      Mouth: Mucous membranes are moist.      Pharynx: No oropharyngeal exudate or posterior oropharyngeal erythema.   Eyes:      General: Lids are normal. Vision grossly intact.         Right eye: No discharge.         Left eye: No discharge.      Extraocular Movements: Extraocular movements intact.      Conjunctiva/sclera:      Right eye: Hemorrhage present. No exudate.     Pupils: Pupils are equal, round, and reactive to light.   Pulmonary:      Effort: Pulmonary effort is normal. No respiratory distress.   Musculoskeletal:      Cervical back: Normal range of motion.   Skin:     General: Skin is warm and dry.   Neurological:      General: No focal deficit present.      Mental Status: She is alert.   Psychiatric:         Mood and Affect: Mood normal.         Behavior: Behavior normal.         Thought Content: Thought content normal.         Judgment: Judgment normal.

## 2024-08-04 ENCOUNTER — HOSPITAL ENCOUNTER (EMERGENCY)
Facility: HOSPITAL | Age: 29
Discharge: HOME | End: 2024-08-04
Attending: EMERGENCY MEDICINE
Payer: COMMERCIAL

## 2024-08-04 VITALS
SYSTOLIC BLOOD PRESSURE: 140 MMHG | TEMPERATURE: 98.7 F | HEART RATE: 80 BPM | BODY MASS INDEX: 35.3 KG/M2 | DIASTOLIC BLOOD PRESSURE: 60 MMHG | RESPIRATION RATE: 18 BRPM | OXYGEN SATURATION: 100 % | WEIGHT: 193 LBS

## 2024-08-04 DIAGNOSIS — J02.9 PHARYNGITIS: Primary | ICD-10-CM

## 2024-08-04 DIAGNOSIS — R59.0 CERVICAL LYMPHADENOPATHY: ICD-10-CM

## 2024-08-04 DIAGNOSIS — J45.30 MILD PERSISTENT ASTHMA WITHOUT COMPLICATION: ICD-10-CM

## 2024-08-04 LAB — S PYO DNA THROAT QL NAA+PROBE: NOT DETECTED

## 2024-08-04 PROCEDURE — 87651 STREP A DNA AMP PROBE: CPT | Performed by: EMERGENCY MEDICINE

## 2024-08-04 PROCEDURE — 99284 EMERGENCY DEPT VISIT MOD MDM: CPT | Performed by: EMERGENCY MEDICINE

## 2024-08-04 PROCEDURE — 99282 EMERGENCY DEPT VISIT SF MDM: CPT

## 2024-08-04 RX ORDER — IBUPROFEN 400 MG/1
400 TABLET ORAL ONCE
Status: COMPLETED | OUTPATIENT
Start: 2024-08-04 | End: 2024-08-04

## 2024-08-04 RX ORDER — ACETAMINOPHEN 325 MG/1
975 TABLET ORAL ONCE
Status: COMPLETED | OUTPATIENT
Start: 2024-08-04 | End: 2024-08-04

## 2024-08-04 RX ADMIN — DEXAMETHASONE SODIUM PHOSPHATE 10 MG: 10 INJECTION, SOLUTION INTRAMUSCULAR; INTRAVENOUS at 14:33

## 2024-08-04 RX ADMIN — ACETAMINOPHEN 975 MG: 325 TABLET ORAL at 14:33

## 2024-08-04 RX ADMIN — IBUPROFEN 400 MG: 400 TABLET, FILM COATED ORAL at 14:33

## 2024-08-04 NOTE — DISCHARGE INSTRUCTIONS
Follow-up with primary care for further care. Contact info provided below if needed.  Use over the counter medications as stated on the bottle as needed for pain control.  Stay hydrated.   Return to the ED with new or worsening symptoms.

## 2024-08-04 NOTE — ED PROVIDER NOTES
"History  Chief Complaint   Patient presents with    Sore Throat     Reported \"there is a lump in my L throat\". Denies any fever, cough, congestion.      Pt is a 28yo F who presents for sore throat.  Patient reports her sore throat began 2 to 3 days ago.  Patient reports she has also noticed \"a lump\" to her left neck.  Patient reports this is painful as well and the pain has been worsening over the past 2 to 3 days.  She states that she has had recent sick contacts with no known diagnosis.  Patient states she has been able to tolerate p.o. but it is painful to swallow.  Patient denies any fevers or chills.  Patient denies any other symptoms.  Patient reports she has otherwise been feeling well.        Prior to Admission Medications   Prescriptions Last Dose Informant Patient Reported? Taking?   EPINEPHrine (EPIPEN) 0.3 mg/0.3 mL SOAJ   Yes No   Sig: inject 0.3 milliliters ( 0.3 milligrams ) intramuscularly in MIDD...  (REFER TO PRESCRIPTION NOTES).   albuterol (2.5 mg/3 mL) 0.083 % nebulizer solution   No No   Sig: Take 3 mL (2.5 mg total) by nebulization every 4 (four) hours as needed for wheezing or shortness of breath   albuterol (Ventolin HFA) 90 mcg/act inhaler   No No   Sig: Inhale 2 puffs every 4 (four) hours as needed for wheezing   fluticasone-salmeterol (AirDuo RespiClick 113/14) 113-14 mcg/act dry powder inhaler   No No   Sig: Inhale 1 puff 2 (two) times a day Rinse mouth after use.   ondansetron (ZOFRAN-ODT) 4 mg disintegrating tablet   No No   Sig: Take 1 tablet (4 mg total) by mouth every 6 (six) hours as needed for nausea or vomiting      Facility-Administered Medications: None       Past Medical History:   Diagnosis Date    Asthma     Eczema     Vitiligo        Past Surgical History:   Procedure Laterality Date     SECTION             Family History   Problem Relation Age of Onset    Eczema Mother     Eczema Maternal Grandfather     Asthma Maternal Grandfather      I have " reviewed and agree with the history as documented.    E-Cigarette/Vaping    E-Cigarette Use Current Some Day User      E-Cigarette/Vaping Substances    Nicotine No     THC No     CBD No     Flavoring No     Other No     Unknown No      Social History     Tobacco Use    Smoking status: Some Days     Average packs/day: (0.1 ttl pk-yrs)     Types: Cigars, Cigarettes     Start date: 2019    Tobacco comments:     Report smokes a weekly cigar   Vaping Use    Vaping status: Some Days   Substance Use Topics    Alcohol use: Yes     Comment: occasional    Drug use: Yes     Frequency: 1.0 times per week     Types: Marijuana       Review of Systems   HENT:  Positive for sore throat.    All other systems reviewed and are negative.      Physical Exam  Physical Exam  Vitals reviewed.   Constitutional:       General: She is not in acute distress.     Appearance: She is well-developed. She is not toxic-appearing or diaphoretic.   HENT:      Head: Normocephalic and atraumatic.      Right Ear: External ear normal.      Left Ear: External ear normal.      Nose: Nose normal.      Mouth/Throat:      Pharynx: Oropharynx is clear. Posterior oropharyngeal erythema present.      Tonsils: Tonsillar exudate present. No tonsillar abscesses. 2+ on the right. 2+ on the left.   Eyes:      Extraocular Movements: Extraocular movements intact.      Pupils: Pupils are equal, round, and reactive to light.   Cardiovascular:      Rate and Rhythm: Normal rate and regular rhythm.      Heart sounds: Normal heart sounds.   Pulmonary:      Effort: Pulmonary effort is normal. No respiratory distress.      Breath sounds: Normal breath sounds.   Abdominal:      General: There is no distension.      Palpations: Abdomen is soft.      Tenderness: There is no abdominal tenderness.   Musculoskeletal:         General: Normal range of motion.      Cervical back: Normal range of motion and neck supple.      Right lower leg: No edema.      Left lower leg: No edema.    Lymphadenopathy:      Cervical: Cervical adenopathy (L sided; Tender) present.   Skin:     General: Skin is warm and dry.      Capillary Refill: Capillary refill takes less than 2 seconds.      Coloration: Skin is not pale.      Findings: No erythema or rash.   Neurological:      General: No focal deficit present.      Mental Status: She is alert and oriented to person, place, and time.   Psychiatric:         Speech: Speech normal.         Behavior: Behavior is cooperative.         Vital Signs  ED Triage Vitals [08/04/24 1411]   Temperature Pulse Respirations Blood Pressure SpO2   98.7 °F (37.1 °C) 80 18 140/60 100 %      Temp Source Heart Rate Source Patient Position - Orthostatic VS BP Location FiO2 (%)   Oral Monitor Sitting Right arm --      Pain Score       6           Vitals:    08/04/24 1411   BP: 140/60   Pulse: 80   Patient Position - Orthostatic VS: Sitting         Visual Acuity      ED Medications  Medications   acetaminophen (TYLENOL) tablet 975 mg (975 mg Oral Given 8/4/24 1433)   ibuprofen (MOTRIN) tablet 400 mg (400 mg Oral Given 8/4/24 1433)   dexamethasone oral liquid 10 mg 1 mL (10 mg Oral Given 8/4/24 1433)       Diagnostic Studies  Results Reviewed       Procedure Component Value Units Date/Time    Strep A PCR [[id number]]  (Normal) Collected: 08/04/24 1421    Lab Status: Final result Specimen: Throat Updated: 08/04/24 1450     STREP A PCR Not Detected                   No orders to display              Procedures  Procedures         ED Course  ED Course as of 08/04/24 1616   Sun Aug 04, 2024   1451 STREP A PCR: Not Detected  Negative.                                                Medical Decision Making  Pt is a 28yo F who presents with sore throat.     Differential diagnosis to include but not limited to bacterial versus viral pharyngitis, abscess, lymphadenopathy.  Will plan for strep swab and treat symptomatically.  See ED course for results and details.    Plan to discharge pt with f/u to  PCP. Discussed returning the ED with new or worsening of symptoms. Discussed use of over the counter medications as stated on the bottle as needed for pain. Pt expressed understanding of discharge instructions, return precautions, and medication instructions and is stable for discharge at this time. All questions were answered and pt was discharged without incident.       Amount and/or Complexity of Data Reviewed  Labs: ordered. Decision-making details documented in ED Course.    Risk  OTC drugs.  Prescription drug management.                 Disposition  Final diagnoses:   Pharyngitis   Cervical lymphadenopathy     Time reflects when diagnosis was documented in both MDM as applicable and the Disposition within this note       Time User Action Codes Description Comment    8/4/2024  3:19 PM Doris Portillo [J02.9] Pharyngitis     8/4/2024  3:20 PM Doris Portillo [R59.0] Cervical lymphadenopathy           ED Disposition       ED Disposition   Discharge    Condition   Stable    Date/Time   Sun Aug 4, 2024  3:19 PM    Comment   Teresa Bear discharge to home/self care.                   Follow-up Information       Follow up With Specialties Details Why Contact Info    [facility]  Call  As needed [phone]              Discharge Medication List as of 8/4/2024  3:20 PM        CONTINUE these medications which have NOT CHANGED    Details   albuterol (2.5 mg/3 mL) 0.083 % nebulizer solution Take 3 mL (2.5 mg total) by nebulization every 4 (four) hours as needed for wheezing or shortness of breath, Starting Mon 10/30/2023, Normal      albuterol (Ventolin HFA) 90 mcg/act inhaler Inhale 2 puffs every 4 (four) hours as needed for wheezing, Starting Wed 4/3/2024, Normal      EPINEPHrine (EPIPEN) 0.3 mg/0.3 mL SOAJ inject 0.3 milliliters ( 0.3 milligrams ) intramuscularly in MIDD...  (REFER TO PRESCRIPTION NOTES)., Historical Med      fluticasone-salmeterol (AirDuo RespiClick 113/14) 113-14 mcg/act dry powder  inhaler Inhale 1 puff 2 (two) times a day Rinse mouth after use., Starting Mon 10/30/2023, Normal      ondansetron (ZOFRAN-ODT) 4 mg disintegrating tablet Take 1 tablet (4 mg total) by mouth every 6 (six) hours as needed for nausea or vomiting, Starting Sat 7/20/2024, Normal             No discharge procedures on file.    PDMP Review       None            ED Provider  Electronically Signed by             Doris Portillo MD  08/04/24 5064

## 2024-08-05 RX ORDER — ALBUTEROL SULFATE 90 UG/1
2 AEROSOL, METERED RESPIRATORY (INHALATION) EVERY 4 HOURS PRN
Qty: 8.5 G | Refills: 2 | Status: SHIPPED | OUTPATIENT
Start: 2024-08-05 | End: 2024-08-12 | Stop reason: SDUPTHER

## 2024-08-12 ENCOUNTER — OFFICE VISIT (OUTPATIENT)
Age: 29
End: 2024-08-12

## 2024-08-12 VITALS
WEIGHT: 192.8 LBS | HEIGHT: 61 IN | DIASTOLIC BLOOD PRESSURE: 70 MMHG | HEART RATE: 85 BPM | TEMPERATURE: 98.1 F | SYSTOLIC BLOOD PRESSURE: 109 MMHG | OXYGEN SATURATION: 97 % | BODY MASS INDEX: 36.4 KG/M2

## 2024-08-12 DIAGNOSIS — J02.9 SORE THROAT: Primary | ICD-10-CM

## 2024-08-12 DIAGNOSIS — J45.30 MILD PERSISTENT ASTHMA WITHOUT COMPLICATION: ICD-10-CM

## 2024-08-12 DIAGNOSIS — L30.9 SEVERE ECZEMA: ICD-10-CM

## 2024-08-12 DIAGNOSIS — L80 VITILIGO: ICD-10-CM

## 2024-08-12 LAB — S PYO AG THROAT QL: NEGATIVE

## 2024-08-12 PROCEDURE — 99203 OFFICE O/P NEW LOW 30 MIN: CPT | Performed by: FAMILY MEDICINE

## 2024-08-12 PROCEDURE — 87880 STREP A ASSAY W/OPTIC: CPT | Performed by: FAMILY MEDICINE

## 2024-08-12 RX ORDER — TRIAMCINOLONE ACETONIDE 1 MG/G
CREAM TOPICAL 2 TIMES DAILY
COMMUNITY
End: 2024-08-12 | Stop reason: SDUPTHER

## 2024-08-12 RX ORDER — FLUTICASONE PROPIONATE AND SALMETEROL 113; 14 UG/1; UG/1
1 POWDER, METERED RESPIRATORY (INHALATION) 2 TIMES DAILY
Qty: 1 EACH | Refills: 11 | Status: SHIPPED | OUTPATIENT
Start: 2024-08-12

## 2024-08-12 RX ORDER — TRIAMCINOLONE ACETONIDE 1 MG/G
CREAM TOPICAL 2 TIMES DAILY
Qty: 453.6 G | Refills: 1 | Status: SHIPPED | OUTPATIENT
Start: 2024-08-12

## 2024-08-12 RX ORDER — ALBUTEROL SULFATE 90 UG/1
2 AEROSOL, METERED RESPIRATORY (INHALATION) EVERY 4 HOURS PRN
Qty: 8.5 G | Refills: 2 | Status: SHIPPED | OUTPATIENT
Start: 2024-08-12

## 2024-08-12 RX ORDER — DIPHENHYDRAMINE HYDROCHLORIDE AND LIDOCAINE HYDROCHLORIDE AND ALUMINUM HYDROXIDE AND MAGNESIUM HYDRO
10 KIT EVERY 4 HOURS PRN
Qty: 237 ML | Refills: 0 | Status: SHIPPED | OUTPATIENT
Start: 2024-08-12

## 2024-08-12 NOTE — PROGRESS NOTES
[facility] Office Visit    Patient Name: Teresa Bear  MRN: [id number]    Assessment/Plan:     1. Sore throat  -     POCT rapid ANTIGEN strepA  -     diphenhydramine, lidocaine, Al/Mg hydroxide, simethicone (Magic Mouthwash) SUSP; Swish and spit 10 mL every 4 (four) hours as needed for mouth pain or discomfort  2. Mild persistent asthma without complication  -     fluticasone-salmeterol (AirDuo RespiClick 113/14) 113-14 mcg/act dry powder inhaler; Inhale 1 puff 2 (two) times a day Rinse mouth after use.  -     albuterol (Ventolin HFA) 90 mcg/act inhaler; Inhale 2 puffs every 4 (four) hours as needed for wheezing  3. Severe eczema  -     triamcinolone (KENALOG) 0.1 % cream; Apply topically 2 (two) times a day  4. Vitiligo  -     triamcinolone (KENALOG) 0.1 % cream; Apply topically 2 (two) times a day     Sore throat for 10 days.  Associated with headaches, hoarse voice, plugged ear sensation, neck pain, feeling of swollen glands, mild pain with swallowing.  No known sick contacts  Has tried NSAIDs and gargling with minimal relief  Continue supportive measures for sore throat.  Antibiotics not indicated at this time  We will send Rx for Magic mouthwash.  Advised patient on appropriate use.    Requesting refill of medications for chronic problems  Sent refill for air duo and Kenalog cream     No follow-ups on file.     Subjective:   HPI  Teresa Bear is a 29 y.o. female who presents for a sore throat.   Patient reports sore throat since Friday 8/2. She went to the ED on 8/4, was not prescribed anything at that time.     Sore Throat   This is a new problem. The current episode started 1 to 4 weeks ago (10 days ago). The problem has been gradually worsening. The pain is worse on the left side. There has been no fever. The pain is at a severity of 8/10. Associated symptoms include ear pain, headaches, a hoarse voice, a plugged ear sensation, neck pain, swollen glands and trouble swallowing.  "Pertinent negatives include no abdominal pain, congestion, coughing, diarrhea or shortness of breath. Exposure to: sick contacts, no known diagnosis. She has tried NSAIDs and gargles for the symptoms. The treatment provided mild relief.        Review of Systems   Constitutional:  Negative for appetite change, chills, fatigue and fever.   HENT:  Positive for ear pain, hoarse voice, sore throat and trouble swallowing. Negative for congestion.    Eyes:  Negative for pain, redness and itching.   Respiratory:  Negative for cough and shortness of breath.    Cardiovascular:  Negative for chest pain and palpitations.   Gastrointestinal:  Negative for abdominal pain and diarrhea.   Genitourinary:  Negative for difficulty urinating, dysuria and hematuria.   Musculoskeletal:  Positive for neck pain. Negative for arthralgias and myalgias.   Skin:  Negative for color change, pallor and rash.   Neurological:  Positive for headaches. Negative for dizziness and light-headedness.        Objective:     /70 (BP Location: Left arm, Patient Position: Sitting, Cuff Size: Standard)   Pulse 85   Temp 98.1 °F (36.7 °C) (Tympanic)   Ht 5' 1\" (1.549 m)   Wt 87.5 kg (192 lb 12.8 oz)   LMP 07/24/2024 (Approximate)   SpO2 97%   BMI 36.43 kg/m²      Physical Exam  Constitutional:       General: She is not in acute distress.     Appearance: She is obese.   HENT:      Head: Normocephalic and atraumatic.      Right Ear: Ear canal and external ear normal. There is impacted cerumen.      Left Ear: Ear canal and external ear normal. There is impacted cerumen.      Nose: Congestion present.      Mouth/Throat:      Mouth: Mucous membranes are moist.      Pharynx: Posterior oropharyngeal erythema (mild) present. No oropharyngeal exudate.   Eyes:      General: No scleral icterus.     Extraocular Movements: Extraocular movements intact.      Conjunctiva/sclera: Conjunctivae normal.   Cardiovascular:      Rate and Rhythm: Normal rate and regular " rhythm.      Heart sounds: Normal heart sounds. No murmur heard.  Pulmonary:      Effort: Pulmonary effort is normal. No respiratory distress.      Breath sounds: Normal breath sounds. No wheezing.   Abdominal:      General: Bowel sounds are normal.      Palpations: Abdomen is soft. There is no mass.      Tenderness: There is no abdominal tenderness.   Musculoskeletal:         General: Normal range of motion.      Cervical back: Normal range of motion and neck supple. Tenderness (left anterior) present.      Right lower leg: No edema.      Left lower leg: No edema.   Lymphadenopathy:      Cervical: Cervical adenopathy (left anterior) present.   Skin:     General: Skin is warm.   Neurological:      General: No focal deficit present.      Mental Status: She is alert and oriented to person, place, and time.   Psychiatric:         Mood and Affect: Mood normal.         Behavior: Behavior normal.          ** Please Note: This note may have been constructed using a voice recognition system **     Wendi Baca DO  08/12/24  5:57 PM

## 2024-08-27 ENCOUNTER — OFFICE VISIT (OUTPATIENT)
Dept: PULMONOLOGY | Facility: MEDICAL CENTER | Age: 29
End: 2024-08-27
Payer: COMMERCIAL

## 2024-08-27 VITALS
TEMPERATURE: 98.2 F | HEIGHT: 61 IN | SYSTOLIC BLOOD PRESSURE: 110 MMHG | WEIGHT: 194 LBS | HEART RATE: 71 BPM | OXYGEN SATURATION: 98 % | DIASTOLIC BLOOD PRESSURE: 74 MMHG | RESPIRATION RATE: 12 BRPM | BODY MASS INDEX: 36.63 KG/M2

## 2024-08-27 DIAGNOSIS — E66.09 CLASS 2 OBESITY DUE TO EXCESS CALORIES WITHOUT SERIOUS COMORBIDITY WITH BODY MASS INDEX (BMI) OF 36.0 TO 36.9 IN ADULT: ICD-10-CM

## 2024-08-27 DIAGNOSIS — J45.30 MILD PERSISTENT ASTHMA WITHOUT COMPLICATION: Primary | ICD-10-CM

## 2024-08-27 DIAGNOSIS — J30.1 SEASONAL ALLERGIC RHINITIS DUE TO POLLEN: ICD-10-CM

## 2024-08-27 PROBLEM — E66.812 CLASS 2 OBESITY DUE TO EXCESS CALORIES WITHOUT SERIOUS COMORBIDITY WITH BODY MASS INDEX (BMI) OF 36.0 TO 36.9 IN ADULT: Status: ACTIVE | Noted: 2023-10-30

## 2024-08-27 PROCEDURE — 99214 OFFICE O/P EST MOD 30 MIN: CPT | Performed by: NURSE PRACTITIONER

## 2024-08-27 NOTE — PROGRESS NOTES
"Pulmonary Follow-Up Note   Teresa Bear 29 y.o. female MRN: [id number]  8/27/2024      Assessment/Plan:    Problem List Items Addressed This Visit          Respiratory    Mild persistent asthma without complication - Primary     She will continue with AirDuo 1 puff twice a day and is aware of proper use and technique.  She also has albuterol MDI/nebulized to use if needed.  She does not need refills today.  We did discuss that she could potentially attempt to de-escalate her air duo if she remains symptom-free and without need for rescue inhaler during the winter months.  If she would like to try this, she can at that time; however, if she has any refractory symptoms or increased use of her albuterol, she needs to return to twice daily dosing.  She verbalized understanding.         Seasonal allergic rhinitis due to pollen     She continues off nasal spray.  She does note some stuffiness in her nose.  We did discuss nasal spray options such as returning to Flonase 1 spray each nostril daily for the symptoms.  She will contemplate this.            Other    Class 2 obesity due to excess calories without serious comorbidity with body mass index (BMI) of 36.0 to 36.9 in adult     Lifestyle modifications and weight loss as able.            Return in about 1 year (around 8/27/2025), or if symptoms worsen or fail to improve.    All of Teresa's questions were answered prior to leaving the office today. She will follow-up in 12 months or sooner should the need arise. She is aware to call our office with any further questions or concerns.    History of Present Illness   Reason for Visit: Follow-up  Chief Complaint: \"I feel good.\"  HPI: Teresa Bear is a 29 y.o. female who presents to the office today for follow-up of asthma.  Overall, she is doing quite well since her last visit.  She has been using her Advair diligently 1 puff twice a day and has had no breakthrough symptoms.  She has not had any breakthrough " pulmonary illnesses or asthma exacerbations.  She has not needed to use her albuterol MDI or her nebulizer.  She works as a  and has not had any symptoms while at work.  Overall she is doing very well.  She does occasionally note some stuffiness in her nose.  She remains off nasal spray.    Review of Systems   All other systems reviewed and are negative.  A full 12-point review of systems was completed and is negative except for those outlined in the HPI.    Historical Information   Past Medical History:   Diagnosis Date    Asthma     Eczema     Vitiligo      Past Surgical History:   Procedure Laterality Date     SECTION           Family History   Problem Relation Age of Onset    Eczema Mother     Eczema Maternal Grandfather     Asthma Maternal Grandfather      Social History   Social History     Substance and Sexual Activity   Alcohol Use Not Currently    Comment: occasional     Social History     Substance and Sexual Activity   Drug Use Not Currently    Frequency: 1.0 times per week     Social History     Tobacco Use   Smoking Status Some Days    Average packs/day: (0.1 ttl pk-yrs)    Types: Cigars, Cigarettes    Start date:    Smokeless Tobacco Not on file   Tobacco Comments    Report smokes a weekly cigar     E-Cigarette/Vaping    E-Cigarette Use Current Some Day User      E-Cigarette/Vaping Substances    Nicotine No     THC No     CBD No     Flavoring No     Other No     Unknown No        Meds/Allergies     Current Outpatient Medications:     albuterol (Ventolin HFA) 90 mcg/act inhaler, Inhale 2 puffs every 4 (four) hours as needed for wheezing, Disp: 8.5 g, Rfl: 2    diphenhydramine, lidocaine, Al/Mg hydroxide, simethicone (Magic Mouthwash) SUSP, Swish and spit 10 mL every 4 (four) hours as needed for mouth pain or discomfort, Disp: 237 mL, Rfl: 0    EPINEPHrine (EPIPEN) 0.3 mg/0.3 mL SOAJ, inject 0.3 milliliters ( 0.3 milligrams ) intramuscularly in MIDD...  (REFER TO  "PRESCRIPTION NOTES)., Disp: , Rfl:     fluticasone-salmeterol (AirDuo RespiClick 113/14) 113-14 mcg/act dry powder inhaler, Inhale 1 puff 2 (two) times a day Rinse mouth after use., Disp: 1 each, Rfl: 11    triamcinolone (KENALOG) 0.1 % cream, Apply topically 2 (two) times a day, Disp: 453.6 g, Rfl: 1    albuterol (2.5 mg/3 mL) 0.083 % nebulizer solution, Take 3 mL (2.5 mg total) by nebulization every 4 (four) hours as needed for wheezing or shortness of breath (Patient not taking: Reported on 8/27/2024), Disp: 360 mL, Rfl: 07    ondansetron (ZOFRAN-ODT) 4 mg disintegrating tablet, Take 1 tablet (4 mg total) by mouth every 6 (six) hours as needed for nausea or vomiting (Patient not taking: Reported on 8/12/2024), Disp: 12 tablet, Rfl: 0  Allergies   Allergen Reactions    Peanuts [Peanut Oil - Food Allergy] Itching       Vitals: Blood pressure 110/74, pulse 71, temperature 98.2 °F (36.8 °C), temperature source Temporal, resp. rate 12, height 5' 1\" (1.549 m), weight 88 kg (194 lb), last menstrual period 07/24/2024, SpO2 98%, not currently breastfeeding. Body mass index is 36.66 kg/m². Oxygen Therapy  SpO2: 98 %    Physical Exam:  Physical Exam  Vitals reviewed.   Constitutional:       General: She is not in acute distress.     Appearance: She is well-developed. She is morbidly obese. She is not toxic-appearing or diaphoretic.   HENT:      Head: Normocephalic and atraumatic.   Eyes:      General: No scleral icterus.     Extraocular Movements: Extraocular movements intact.   Neck:      Trachea: No tracheal deviation.   Cardiovascular:      Rate and Rhythm: Normal rate and regular rhythm.      Heart sounds: S1 normal and S2 normal. No murmur heard.     No friction rub. No gallop.   Pulmonary:      Effort: Pulmonary effort is normal. No tachypnea, accessory muscle usage or respiratory distress.      Breath sounds: Normal breath sounds. No stridor. No decreased breath sounds, wheezing, rhonchi or rales.   Chest:      Chest " "wall: No tenderness.   Abdominal:      General: Bowel sounds are normal. There is no distension.      Palpations: Abdomen is soft.      Tenderness: There is no abdominal tenderness.   Musculoskeletal:      Cervical back: Neck supple.      Right lower leg: No edema.      Left lower leg: No edema.   Skin:     General: Skin is warm and dry.      Findings: No rash.   Neurological:      Mental Status: She is alert and oriented to person, place, and time.      GCS: GCS eye subscore is 4. GCS verbal subscore is 5. GCS motor subscore is 6.   Psychiatric:         Speech: Speech normal.         Behavior: Behavior normal. Behavior is cooperative.       Imaging and other studies: I have personally reviewed pertinent reports.     Chest x-ray from July 20, 2024 showed no acute pulmonary infiltrate.      NESTOR Recinos  [facility]        Portions of the record may have been created with voice recognition software.  Occasional wrong word or \"sound a like\" substitutions may have occurred due to the inherent limitations of voice recognition software.  Read the chart carefully and recognize, using context, where substitutions have occurred or contact the dictating provider.  "

## 2024-08-27 NOTE — ASSESSMENT & PLAN NOTE
She will continue with AirDuo 1 puff twice a day and is aware of proper use and technique.  She also has albuterol MDI/nebulized to use if needed.  She does not need refills today.  We did discuss that she could potentially attempt to de-escalate her air duo if she remains symptom-free and without need for rescue inhaler during the winter months.  If she would like to try this, she can at that time; however, if she has any refractory symptoms or increased use of her albuterol, she needs to return to twice daily dosing.  She verbalized understanding.

## 2024-08-27 NOTE — ASSESSMENT & PLAN NOTE
She continues off nasal spray.  She does note some stuffiness in her nose.  We did discuss nasal spray options such as returning to Flonase 1 spray each nostril daily for the symptoms.  She will contemplate this.

## 2024-11-13 ENCOUNTER — OFFICE VISIT (OUTPATIENT)
Dept: OBGYN CLINIC | Facility: CLINIC | Age: 29
End: 2024-11-13
Payer: COMMERCIAL

## 2024-11-13 VITALS
SYSTOLIC BLOOD PRESSURE: 118 MMHG | BODY MASS INDEX: 35.7 KG/M2 | HEIGHT: 62 IN | DIASTOLIC BLOOD PRESSURE: 70 MMHG | WEIGHT: 194 LBS

## 2024-11-13 DIAGNOSIS — Z31.69 INFERTILITY COUNSELING: Primary | ICD-10-CM

## 2024-11-13 PROCEDURE — 99385 PREV VISIT NEW AGE 18-39: CPT | Performed by: STUDENT IN AN ORGANIZED HEALTH CARE EDUCATION/TRAINING PROGRAM
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
## 2024-11-15 ENCOUNTER — RESULTS FOLLOW-UP (OUTPATIENT)
Dept: LABOR AND DELIVERY | Facility: HOSPITAL | Age: 29
End: 2024-11-15

## 2024-11-15 DIAGNOSIS — B96.89 BACTERIAL VAGINOSIS: Primary | ICD-10-CM

## 2024-11-15 DIAGNOSIS — N76.0 BACTERIAL VAGINOSIS: Primary | ICD-10-CM

## 2024-11-15 LAB
A VAGINAE DNA VAG QL NAA+PROBE: ABNORMAL SCORE
BVAB2 DNA VAG QL NAA+PROBE: ABNORMAL SCORE
C ALBICANS DNA VAG QL NAA+PROBE: NEGATIVE
C GLABRATA DNA VAG QL NAA+PROBE: NEGATIVE
MEGA1 DNA VAG QL NAA+PROBE: ABNORMAL SCORE
T VAGINALIS RRNA SPEC QL NAA+PROBE: NEGATIVE

## 2024-11-15 RX ORDER — METRONIDAZOLE 500 MG/1
500 TABLET ORAL EVERY 12 HOURS SCHEDULED
Qty: 14 TABLET | Refills: 0 | Status: SHIPPED | OUTPATIENT
Start: 2024-11-15 | End: 2024-11-22

## 2024-12-17 ENCOUNTER — HOSPITAL ENCOUNTER (EMERGENCY)
Facility: HOSPITAL | Age: 29
Discharge: HOME | End: 2024-12-17
Attending: STUDENT IN AN ORGANIZED HEALTH CARE EDUCATION/TRAINING PROGRAM
Payer: OTHER MISCELLANEOUS

## 2024-12-17 ENCOUNTER — APPOINTMENT (EMERGENCY)
Dept: RADIOLOGY | Facility: HOSPITAL | Age: 29
End: 2024-12-17
Payer: OTHER MISCELLANEOUS

## 2024-12-17 VITALS
TEMPERATURE: 98.7 F | RESPIRATION RATE: 17 BRPM | OXYGEN SATURATION: 96 % | SYSTOLIC BLOOD PRESSURE: 122 MMHG | DIASTOLIC BLOOD PRESSURE: 72 MMHG | WEIGHT: 192 LBS | BODY MASS INDEX: 35.12 KG/M2 | HEART RATE: 66 BPM

## 2024-12-17 DIAGNOSIS — M54.50 ACUTE LOW BACK PAIN: Primary | ICD-10-CM

## 2024-12-17 LAB
BACTERIA UR QL AUTO: NORMAL /HPF
BILIRUB UR QL STRIP: NEGATIVE
CLARITY UR: CLEAR
COLOR UR: YELLOW
EXT PREGNANCY TEST URINE: NEGATIVE
EXT. CONTROL: NORMAL
GLUCOSE UR STRIP-MCNC: NEGATIVE MG/DL
HGB UR QL STRIP.AUTO: NEGATIVE
KETONES UR STRIP-MCNC: NEGATIVE MG/DL
LEUKOCYTE ESTERASE UR QL STRIP: NEGATIVE
NITRITE UR QL STRIP: NEGATIVE
NON-SQ EPI CELLS URNS QL MICRO: NORMAL /HPF
PH UR STRIP.AUTO: 6.5 [PH]
PROT UR STRIP-MCNC: ABNORMAL MG/DL
RBC #/AREA URNS AUTO: NORMAL /HPF
SP GR UR STRIP.AUTO: 1.02 (ref 1–1.03)
UROBILINOGEN UR STRIP-ACNC: 2 MG/DL
WBC #/AREA URNS AUTO: NORMAL /HPF

## 2024-12-17 PROCEDURE — 72131 CT LUMBAR SPINE W/O DYE: CPT

## 2024-12-17 PROCEDURE — 96365 THER/PROPH/DIAG IV INF INIT: CPT

## 2024-12-17 PROCEDURE — 81025 URINE PREGNANCY TEST: CPT

## 2024-12-17 PROCEDURE — 99284 EMERGENCY DEPT VISIT MOD MDM: CPT

## 2024-12-17 PROCEDURE — 96375 TX/PRO/DX INJ NEW DRUG ADDON: CPT

## 2024-12-17 PROCEDURE — 81001 URINALYSIS AUTO W/SCOPE: CPT

## 2024-12-17 PROCEDURE — 99283 EMERGENCY DEPT VISIT LOW MDM: CPT

## 2024-12-17 RX ORDER — METHOCARBAMOL 500 MG/1
500 TABLET, FILM COATED ORAL 2 TIMES DAILY
Qty: 8 TABLET | Refills: 0 | Status: SHIPPED | OUTPATIENT
Start: 2024-12-17 | End: 2024-12-21

## 2024-12-17 RX ORDER — ACETAMINOPHEN 10 MG/ML
1000 INJECTION, SOLUTION INTRAVENOUS ONCE
Status: COMPLETED | OUTPATIENT
Start: 2024-12-17 | End: 2024-12-17

## 2024-12-17 RX ORDER — NAPROXEN 500 MG/1
500 TABLET ORAL 2 TIMES DAILY WITH MEALS
Qty: 14 TABLET | Refills: 0 | Status: SHIPPED | OUTPATIENT
Start: 2024-12-17 | End: 2024-12-24

## 2024-12-17 RX ORDER — LIDOCAINE 50 MG/G
1 PATCH TOPICAL ONCE
Status: DISCONTINUED | OUTPATIENT
Start: 2024-12-17 | End: 2024-12-17 | Stop reason: HOSPADM

## 2024-12-17 RX ORDER — KETOROLAC TROMETHAMINE 30 MG/ML
15 INJECTION, SOLUTION INTRAMUSCULAR; INTRAVENOUS ONCE
Status: COMPLETED | OUTPATIENT
Start: 2024-12-17 | End: 2024-12-17

## 2024-12-17 RX ADMIN — ACETAMINOPHEN 1000 MG: 1000 INJECTION, SOLUTION INTRAVENOUS at 10:33

## 2024-12-17 RX ADMIN — KETOROLAC TROMETHAMINE 15 MG: 30 INJECTION, SOLUTION INTRAMUSCULAR; INTRAVENOUS at 10:33

## 2024-12-17 RX ADMIN — LIDOCAINE 1 PATCH: 50 PATCH TOPICAL at 10:33

## 2024-12-17 NOTE — Clinical Note
Teresa Bear was seen and treated in our emergency department on 12/17/2024.                Diagnosis:     Teresa  .    She may return on this date: 12/20/2024         If you have any questions or concerns, please don't hesitate to call.      Muriel Gomez PA-C    ______________________________           _______________          _______________  Hospital Representative                              Date                                Time

## 2024-12-17 NOTE — DISCHARGE INSTRUCTIONS
Please make sure to follow-up with your primary care provider.  If you do not have one, contact information has been provided below for you to establish one. Also referral to the occupational medicine has been provided for you, please follow up with occupational medicine as soon as possible.    Please take the medication that has been sent to your pharmacy including naproxen and Robaxin.  Please note that you cannot drive, trying, operate heavy machinery or similar activities while taking Robaxin.    Please return to the emergency department immediately if you are experiencing worsening back pain, problems with urination or having a bowel movement, fever, chills, numbness, weakness, inability to walk, or any new or worsening symptoms.

## 2024-12-17 NOTE — ED PROVIDER NOTES
Time reflects when diagnosis was documented in both MDM as applicable and the Disposition within this note       Time User Action Codes Description Comment    12/17/2024 11:46 AM Muriel Gomez Add [M54.50] Acute low back pain           ED Disposition       ED Disposition   Discharge    Condition   Stable    Date/Time   Tue Dec 17, 2024 11:46 AM    Comment   Teresa Bear discharge to home/self care.                   Assessment & Plan       Medical Decision Making  Patient in no acute distress, afebrile. No lateralizing motor or sensory deficits. The differential diagnosis may include, but is not limited to, mechanical back pain such as acute musculoskeletal strain/sprain, muscle spasm, sciatica, and unlikely for UTI or pyelonephritis, etc. Plan: Labs, imaging, pain management.    Normal computed tomography of the lumbar spine per impression report. No leukocytes, nitrites, blood in the UA. Patient reports improvement after pain medication. Recommended rest and avoidance of activities that exacerbate pain.     Robaxin and naproxen send to pharmacy. Advised patient to avoid drinking alcohol, driving or operating heavy machinery while using the muscle relaxant. Patient verbalized understanding and is agreeable.  Emphasized importance of following up with primary care provider and return precautions discussed, verbally, as well as written in the AVS, with full understanding and no other questions.    Amount and/or Complexity of Data Reviewed  Labs: ordered.  Radiology: ordered.    Risk  Prescription drug management.             Medications   acetaminophen (Ofirmev) injection 1,000 mg (0 mg Intravenous Stopped 12/17/24 1151)   ketorolac (TORADOL) injection 15 mg (15 mg Intravenous Given 12/17/24 1033)       ED Risk Strat Scores                          SBIRT 20yo+      Flowsheet Row Most Recent Value   Initial Alcohol Screen: US AUDIT-C     1. How often do you have a drink containing alcohol? 0 Filed at: 12/17/2024  1010   2. How many drinks containing alcohol do you have on a typical day you are drinking?  0 Filed at: 2024 1010   3a. Male UNDER 65: How often do you have five or more drinks on one occasion? 0 Filed at: 2024 1010   3b. FEMALE Any Age, or MALE 65+: How often do you have 4 or more drinks on one occassion? 0 Filed at: 2024 1010   Audit-C Score 0 Filed at: 2024 1010   BARBI: How many times in the past year have you...    Used an illegal drug or used a prescription medication for non-medical reasons? Never Filed at: 2024 1010                            History of Present Illness       Chief Complaint   Patient presents with    Back Pain     Patient c/o lower back pain - states injured while lifting at work this morning around 0830.       Past Medical History:   Diagnosis Date    Asthma     Eczema     Vitiligo       Past Surgical History:   Procedure Laterality Date     SECTION            Family History   Problem Relation Age of Onset    Eczema Mother     Eczema Maternal Grandfather     Asthma Maternal Grandfather       Social History     Tobacco Use    Smoking status: Former     Average packs/day: (0.1 ttl pk-yrs)     Types: Cigarettes, Cigars     Start date:     Smokeless tobacco: Never    Tobacco comments:     Report smokes a weekly cigar   Vaping Use    Vaping status: Some Days   Substance Use Topics    Alcohol use: Yes     Comment: occasional    Drug use: Not Currently     Frequency: 1.0 times per week     Types: Marijuana      E-Cigarette/Vaping    E-Cigarette Use Current Some Day User       E-Cigarette/Vaping Substances    Nicotine No     THC No     CBD No     Flavoring No     Other No     Unknown No       I have reviewed and agree with the history as documented.     The patient is a 29-year-old female, employed as a [employer] worker, with a past medical history of asthma, eczema, vitiligo presenting with acute left low back pain after lifting up a heavy package  at work.  She reports a tingling sensation radiating down the left lower extremity, but denies numbness, weakness, or difficulty ambulating.  She denies  or GI changes, saddle anesthesia, leg numbness and reports she has not used any over-the-counter medications for symptom relief since incident. No other complaints at this time.      Back Pain  Associated symptoms: no abdominal pain, no chest pain, no dysuria, no fever, no headaches, no numbness and no weakness        Review of Systems   Constitutional:  Negative for chills and fever.   HENT:  Negative for congestion and rhinorrhea.    Eyes:  Negative for pain and visual disturbance.   Respiratory:  Negative for cough and shortness of breath.    Cardiovascular:  Negative for chest pain and palpitations.   Gastrointestinal:  Negative for abdominal pain, nausea and vomiting.   Genitourinary:  Negative for decreased urine volume, difficulty urinating, dysuria and urgency.   Musculoskeletal:  Positive for back pain and myalgias. Negative for arthralgias, gait problem, neck pain and neck stiffness.   Skin:  Negative for color change and rash.   Neurological:  Negative for dizziness, tremors, seizures, syncope, weakness, numbness and headaches.        Patient reports a sensation of tingling in the left lower extremity   All other systems reviewed and are negative.          Objective       ED Triage Vitals [12/17/24 1005]   Temperature Pulse Blood Pressure Respirations SpO2 Patient Position - Orthostatic VS   98.7 °F (37.1 °C) 66 122/72 17 96 % Sitting      Temp Source Heart Rate Source BP Location FiO2 (%) Pain Score    Tympanic Monitor Right arm -- 8      Vitals      Date and Time Temp Pulse SpO2 Resp BP Pain Score FACES Pain Rating User   12/17/24 1005 98.7 °F (37.1 °C) 66 96 % 17 122/72 8 -- AC            Physical Exam  Vitals and nursing note reviewed.   Constitutional:       General: She is not in acute distress.     Appearance: Normal appearance. She is  well-developed and well-groomed. She is not ill-appearing.   HENT:      Head: Normocephalic and atraumatic.   Eyes:      Conjunctiva/sclera: Conjunctivae normal.   Cardiovascular:      Rate and Rhythm: Normal rate and regular rhythm.      Heart sounds: No murmur heard.  Pulmonary:      Effort: Pulmonary effort is normal. No respiratory distress.      Breath sounds: Normal breath sounds.   Abdominal:      General: Abdomen is flat.      Palpations: Abdomen is soft.      Tenderness: There is no abdominal tenderness.   Musculoskeletal:         General: No swelling. Normal range of motion.      Cervical back: Normal range of motion and neck supple. No rigidity or tenderness.      Comments: Patient ambulated to hospital bed without assistance or difficulty. The mid back appears normal in contour, with no visible deformities, swelling or discoloration. The patient exhibits normal posture, with no obvious guarding or asymmetry. Tenderness is noted over the left lumbar paraspinal muscles.  No tenderness over the spinous processes.  No palpable masses, step-offs or warmth are present. Strength in UE and LE 5/5, sensation intact.   Skin:     General: Skin is warm and dry.      Capillary Refill: Capillary refill takes less than 2 seconds.   Neurological:      General: No focal deficit present.      Mental Status: She is alert and oriented to person, place, and time. Mental status is at baseline.      Sensory: No sensory deficit.      Motor: No weakness.      Gait: Gait normal.   Psychiatric:         Mood and Affect: Mood normal.         Results Reviewed       Procedure Component Value Units Date/Time    Urine Microscopic [[id number]]  (Normal) Collected: 12/17/24 1029    Lab Status: Final result Specimen: Urine, Clean Catch Updated: 12/17/24 1150     RBC, UA None Seen /hpf      WBC, UA None Seen /hpf      Epithelial Cells Occasional /hpf      Bacteria, UA Occasional /hpf     UA w Reflex to Microscopic w Reflex to Culture  [[id number]]  (Abnormal) Collected: 12/17/24 1029    Lab Status: Final result Specimen: Urine, Clean Catch Updated: 12/17/24 1040     Color, UA Yellow     Clarity, UA Clear     Specific Gravity, UA 1.025     pH, UA 6.5     Leukocytes, UA Negative     Nitrite, UA Negative     Protein, UA Trace mg/dl      Glucose, UA Negative mg/dl      Ketones, UA Negative mg/dl      Urobilinogen, UA 2.0 mg/dl      Bilirubin, UA Negative     Occult Blood, UA Negative    POCT pregnancy, urine [[id number]]  (Normal) Collected: 12/17/24 1030    Lab Status: Final result Updated: 12/17/24 1030     EXT Preg Test, Ur Negative     Control Valid            CT spine lumbar without contrast   Final Interpretation by Sheldon Wei MD (12/17 1127)      Normal computed tomography of the lumbar spine.      Workstation performed: [id number]             Procedures    ED Medication and Procedure Management   Prior to Admission Medications   Prescriptions Last Dose Informant Patient Reported? Taking?   EPINEPHrine (EPIPEN) 0.3 mg/0.3 mL SOAJ  Self Yes No   Sig: inject 0.3 milliliters ( 0.3 milligrams ) intramuscularly in MIDD...  (REFER TO PRESCRIPTION NOTES).   albuterol (2.5 mg/3 mL) 0.083 % nebulizer solution  Self No No   Sig: Take 3 mL (2.5 mg total) by nebulization every 4 (four) hours as needed for wheezing or shortness of breath   Patient not taking: Reported on 8/27/2024   albuterol (Ventolin HFA) 90 mcg/act inhaler  Self No No   Sig: Inhale 2 puffs every 4 (four) hours as needed for wheezing   diphenhydramine, lidocaine, Al/Mg hydroxide, simethicone (Magic Mouthwash) SUSP  Self No No   Sig: Swish and spit 10 mL every 4 (four) hours as needed for mouth pain or discomfort   Patient not taking: Reported on 11/13/2024   fluticasone-salmeterol (AirDuo RespiClick 113/14) 113-14 mcg/act dry powder inhaler  Self No No   Sig: Inhale 1 puff 2 (two) times a day Rinse mouth after use.   ondansetron (ZOFRAN-ODT) 4 mg disintegrating tablet  Self No  No   Sig: Take 1 tablet (4 mg total) by mouth every 6 (six) hours as needed for nausea or vomiting   Patient not taking: Reported on 8/12/2024   triamcinolone (KENALOG) 0.1 % cream  Self No No   Sig: Apply topically 2 (two) times a day      Facility-Administered Medications: None     Discharge Medication List as of 12/17/2024 11:51 AM        START taking these medications    Details   methocarbamol (ROBAXIN) 500 mg tablet Take 1 tablet (500 mg total) by mouth 2 (two) times a day for 4 days, Starting Tue 12/17/2024, Until Sat 12/21/2024, Normal      naproxen (Naprosyn) 500 mg tablet Take 1 tablet (500 mg total) by mouth 2 (two) times a day with meals for 7 days, Starting Tue 12/17/2024, Until Tue 12/24/2024, Normal           CONTINUE these medications which have NOT CHANGED    Details   albuterol (2.5 mg/3 mL) 0.083 % nebulizer solution Take 3 mL (2.5 mg total) by nebulization every 4 (four) hours as needed for wheezing or shortness of breath, Starting Mon 10/30/2023, Normal      albuterol (Ventolin HFA) 90 mcg/act inhaler Inhale 2 puffs every 4 (four) hours as needed for wheezing, Starting Mon 8/12/2024, Normal      diphenhydramine, lidocaine, Al/Mg hydroxide, simethicone (Magic Mouthwash) SUSP Swish and spit 10 mL every 4 (four) hours as needed for mouth pain or discomfort, Starting Mon 8/12/2024, Normal      EPINEPHrine (EPIPEN) 0.3 mg/0.3 mL SOAJ inject 0.3 milliliters ( 0.3 milligrams ) intramuscularly in MIDD...  (REFER TO PRESCRIPTION NOTES)., Historical Med      fluticasone-salmeterol (AirDuo RespiClick 113/14) 113-14 mcg/act dry powder inhaler Inhale 1 puff 2 (two) times a day Rinse mouth after use., Starting Mon 8/12/2024, Normal      ondansetron (ZOFRAN-ODT) 4 mg disintegrating tablet Take 1 tablet (4 mg total) by mouth every 6 (six) hours as needed for nausea or vomiting, Starting Sat 7/20/2024, Normal      triamcinolone (KENALOG) 0.1 % cream Apply topically 2 (two) times a day, Starting Mon 8/12/2024,  Normal             ED SEPSIS DOCUMENTATION   Time reflects when diagnosis was documented in both MDM as applicable and the Disposition within this note       Time User Action Codes Description Comment    12/17/2024 11:46 AM Muriel Gomez Add [M54.50] Acute low back pain                  Muriel Gomez PA-C  12/17/24 1507

## 2024-12-18 ENCOUNTER — TELEPHONE (OUTPATIENT)
Age: 29
End: 2024-12-18

## 2024-12-18 NOTE — TELEPHONE ENCOUNTER
Patient was seen in ED, No out reach done upon chart review Patient has has been advised to follow up with OT.    ED:[facility]  CC: Back Pain  DX:Acute Low back pain  Time: 3:34 pm  Last OV: 8/12/24    No further action needed at this time.

## 2024-12-26 DIAGNOSIS — J45.30 MILD PERSISTENT ASTHMA WITHOUT COMPLICATION: ICD-10-CM

## 2024-12-26 RX ORDER — ALBUTEROL SULFATE 90 UG/1
2 INHALANT RESPIRATORY (INHALATION) EVERY 4 HOURS PRN
Qty: 8.5 G | Refills: 2 | Status: SHIPPED | OUTPATIENT
Start: 2024-12-26

## 2025-03-10 ENCOUNTER — HOSPITAL ENCOUNTER (EMERGENCY)
Facility: HOSPITAL | Age: 30
Discharge: HOME | End: 2025-03-11
Attending: EMERGENCY MEDICINE
Payer: COMMERCIAL

## 2025-03-10 DIAGNOSIS — T78.2XXA ANAPHYLAXIS, INITIAL ENCOUNTER: ICD-10-CM

## 2025-03-10 DIAGNOSIS — T78.40XA ALLERGIC REACTION, INITIAL ENCOUNTER: Primary | ICD-10-CM

## 2025-03-10 PROCEDURE — 93005 ELECTROCARDIOGRAM TRACING: CPT

## 2025-03-10 PROCEDURE — 99284 EMERGENCY DEPT VISIT MOD MDM: CPT

## 2025-03-10 PROCEDURE — 96374 THER/PROPH/DIAG INJ IV PUSH: CPT

## 2025-03-10 PROCEDURE — 99284 EMERGENCY DEPT VISIT MOD MDM: CPT | Performed by: EMERGENCY MEDICINE

## 2025-03-10 PROCEDURE — 96375 TX/PRO/DX INJ NEW DRUG ADDON: CPT

## 2025-03-10 PROCEDURE — 96372 THER/PROPH/DIAG INJ SC/IM: CPT

## 2025-03-10 RX ORDER — METHYLPREDNISOLONE SODIUM SUCCINATE 125 MG/2ML
125 INJECTION, POWDER, LYOPHILIZED, FOR SOLUTION INTRAMUSCULAR; INTRAVENOUS ONCE
Status: COMPLETED | OUTPATIENT
Start: 2025-03-10 | End: 2025-03-10

## 2025-03-10 RX ORDER — DIPHENHYDRAMINE HYDROCHLORIDE 50 MG/ML
50 INJECTION, SOLUTION INTRAMUSCULAR; INTRAVENOUS ONCE
Status: COMPLETED | OUTPATIENT
Start: 2025-03-10 | End: 2025-03-10

## 2025-03-10 RX ORDER — FAMOTIDINE 10 MG/ML
20 INJECTION, SOLUTION INTRAVENOUS ONCE
Status: COMPLETED | OUTPATIENT
Start: 2025-03-10 | End: 2025-03-10

## 2025-03-10 RX ORDER — EPINEPHRINE 1 MG/ML
0.3 INJECTION, SOLUTION, CONCENTRATE INTRAVENOUS ONCE
Status: COMPLETED | OUTPATIENT
Start: 2025-03-10 | End: 2025-03-10

## 2025-03-10 RX ADMIN — DIPHENHYDRAMINE HYDROCHLORIDE 50 MG: 50 INJECTION, SOLUTION INTRAMUSCULAR; INTRAVENOUS at 21:24

## 2025-03-10 RX ADMIN — METHYLPREDNISOLONE SODIUM SUCCINATE 125 MG: 125 INJECTION, POWDER, FOR SOLUTION INTRAMUSCULAR; INTRAVENOUS at 21:24

## 2025-03-10 RX ADMIN — FAMOTIDINE 20 MG: 10 INJECTION, SOLUTION INTRAVENOUS at 21:25

## 2025-03-10 RX ADMIN — EPINEPHRINE 0.3 MG: 1 INJECTION, SOLUTION, CONCENTRATE INTRAVENOUS at 21:25

## 2025-03-10 NOTE — Clinical Note
Teresa Bear was seen and treated in our emergency department on 3/10/2025.    No restrictions            Diagnosis:     Teresa  may return to work on return date.    She may return on this date: 03/13/2025         If you have any questions or concerns, please don't hesitate to call.      Arielle Yu RN    ______________________________           _______________          _______________  Hospital Representative                              Date                                Time

## 2025-03-11 VITALS
RESPIRATION RATE: 17 BRPM | HEART RATE: 77 BPM | TEMPERATURE: 98.3 F | SYSTOLIC BLOOD PRESSURE: 117 MMHG | OXYGEN SATURATION: 99 % | DIASTOLIC BLOOD PRESSURE: 64 MMHG

## 2025-03-11 LAB
ATRIAL RATE: 93 BPM
P AXIS: 71 DEGREES
PR INTERVAL: 180 MS
QRS AXIS: 74 DEGREES
QRSD INTERVAL: 66 MS
QT INTERVAL: 350 MS
QTC INTERVAL: 435 MS
T WAVE AXIS: 64 DEGREES
VENTRICULAR RATE: 93 BPM

## 2025-03-11 PROCEDURE — 93010 ELECTROCARDIOGRAM REPORT: CPT | Performed by: INTERNAL MEDICINE

## 2025-03-11 NOTE — ED PROVIDER NOTES
Time reflects when diagnosis was documented in both MDM as applicable and the Disposition within this note       Time User Action Codes Description Comment    3/10/2025 11:42 PM RamuCathleen hoover Add [T78.40XA] Allergic reaction, initial encounter     3/10/2025 11:43 PM Cathleen Easton Add [T78.2XXA] Anaphylaxis, initial encounter           ED Disposition       ED Disposition   Discharge    Condition   Stable    Date/Time   Tue Mar 11, 2025 12:06 AM    Comment   Teresa Bear discharge to home/self care.                   Assessment & Plan       Medical Decision Making  2300- pt. Feeling better.  Denies sob or throat tightness.  Pulse ox 100% on room air.  No hives.  0010 - pt. Monitored for 3 hours.  Allergic reaction improved.  Advised continue benadryl for next 24 hours, use epipen if this happens again and return to ER.    Risk  Prescription drug management.             Medications   EPINEPHrine PF (ADRENALIN) 1 mg/mL injection 0.3 mg (0.3 mg Intramuscular Given 3/10/25 2125)   diphenhydrAMINE (BENADRYL) injection 50 mg (50 mg Intravenous Given 3/10/25 2124)   Famotidine (PF) (PEPCID) injection 20 mg (20 mg Intravenous Given 3/10/25 2125)   methylPREDNISolone sodium succinate (Solu-MEDROL) injection 125 mg (125 mg Intravenous Given 3/10/25 2124)       ED Risk Strat Scores                                                History of Present Illness       Chief Complaint   Patient presents with    Allergic Reaction     Pt comes in with allergic reaction where she ate a  chocolate pistachio @ 9:15 am. Pt comes in with swollen eyes  and sob. Pt took one  benedryl before coming in        Past Medical History:   Diagnosis Date    Asthma     Eczema     Vitiligo       Past Surgical History:   Procedure Laterality Date     SECTION            Family History   Problem Relation Age of Onset    Eczema Mother     Eczema Maternal Grandfather     Asthma Maternal Grandfather       Social History      Tobacco Use    Smoking status: Former     Average packs/day: (0.1 ttl pk-yrs)     Types: Cigarettes, Cigars     Start date: 2019    Smokeless tobacco: Never    Tobacco comments:     Report smokes a weekly cigar   Vaping Use    Vaping status: Some Days   Substance Use Topics    Alcohol use: Yes     Comment: occasional    Drug use: Not Currently     Frequency: 1.0 times per week     Types: Marijuana      E-Cigarette/Vaping    E-Cigarette Use Current Some Day User       E-Cigarette/Vaping Substances    Nicotine No     THC No     CBD No     Flavoring No     Other No     Unknown No       I have reviewed and agree with the history as documented.     28 yo female c/o allergic reaction that just started prior to arrival after eating something that may have had nuts in it.  She c/o feeling itchy all over with throat tightness and trouble breathing.  No recent illness or fever.  She has epipen but didn't use it.      History provided by:  Patient   used: No    Allergic Reaction  Presenting symptoms: rash        Review of Systems   Constitutional:  Negative for fever.   HENT:  Positive for sore throat.    Respiratory:  Positive for shortness of breath. Negative for cough.    Cardiovascular:  Negative for chest pain.   Gastrointestinal:  Negative for diarrhea and vomiting.   Skin:  Positive for rash.           Objective       ED Triage Vitals   Temperature Pulse Blood Pressure Respirations SpO2 Patient Position - Orthostatic VS   03/10/25 2127 03/10/25 2121 03/10/25 2127 03/10/25 2121 03/10/25 2122 --   98.3 °F (36.8 °C) (!) 109 159/80 20 100 %       Temp src Heart Rate Source BP Location FiO2 (%) Pain Score    -- 03/10/25 2121 -- -- --     Monitor         Vitals      Date and Time Temp Pulse SpO2 Resp BP Pain Score FACES Pain Rating User   03/10/25 2315 -- 79 99 % 18 120/68 -- -- CAC   03/10/25 2215 -- 88 100 % 18 137/82 -- -- CAC   03/10/25 2127 98.3 °F (36.8 °C) -- -- -- 159/80 -- -- CAC   03/10/25  2122 -- -- 100 % -- -- -- -- [facility]   03/10/25 2121 -- 109 -- 20 -- -- -- [facility]            Physical Exam  Vitals and nursing note reviewed.   Constitutional:       General: She is in acute distress.      Appearance: She is well-developed. She is ill-appearing. She is not diaphoretic.      Comments: Pt. Scratching   HENT:      Head: Normocephalic and atraumatic.      Mouth/Throat:      Mouth: Mucous membranes are moist.      Pharynx: Oropharynx is clear.      Comments: No tongue or posterior pharynx swelling  Eyes:      General: No scleral icterus.     Conjunctiva/sclera: Conjunctivae normal.   Cardiovascular:      Rate and Rhythm: Regular rhythm. Tachycardia present.      Heart sounds: Normal heart sounds. No murmur heard.  Pulmonary:      Effort: Pulmonary effort is normal. No respiratory distress.      Breath sounds: Normal breath sounds.   Abdominal:      General: Bowel sounds are normal. There is no distension.      Palpations: Abdomen is soft.      Tenderness: There is no abdominal tenderness.   Musculoskeletal:         General: No deformity. Normal range of motion.      Cervical back: Normal range of motion and neck supple.      Right lower leg: No edema.      Left lower leg: No edema.   Skin:     General: Skin is warm and dry.      Coloration: Skin is not pale.      Findings: Rash present.      Comments: Vitiligo; ? faint hives   Neurological:      General: No focal deficit present.      Mental Status: She is alert and oriented to person, place, and time.      Cranial Nerves: No cranial nerve deficit.   Psychiatric:         Behavior: Behavior normal.      Comments: anxious         Results Reviewed       None            No orders to display       Procedures    ED Medication and Procedure Management   Prior to Admission Medications   Prescriptions Last Dose Informant Patient Reported? Taking?   EPINEPHrine (EPIPEN) 0.3 mg/0.3 mL SOAJ  Self Yes No   Sig: inject 0.3 milliliters ( 0.3 milligrams ) intramuscularly in  MIDD...  (REFER TO PRESCRIPTION NOTES).   albuterol (2.5 mg/3 mL) 0.083 % nebulizer solution  Self No No   Sig: Take 3 mL (2.5 mg total) by nebulization every 4 (four) hours as needed for wheezing or shortness of breath   Patient not taking: Reported on 8/27/2024   albuterol (Ventolin HFA) 90 mcg/act inhaler   No No   Sig: Inhale 2 puffs every 4 (four) hours as needed for wheezing   diphenhydramine, lidocaine, Al/Mg hydroxide, simethicone (Magic Mouthwash) SUSP  Self No No   Sig: Swish and spit 10 mL every 4 (four) hours as needed for mouth pain or discomfort   Patient not taking: Reported on 11/13/2024   fluticasone-salmeterol (AirDuo RespiClick 113/14) 113-14 mcg/act dry powder inhaler  Self No No   Sig: Inhale 1 puff 2 (two) times a day Rinse mouth after use.   methocarbamol (ROBAXIN) 500 mg tablet   No No   Sig: Take 1 tablet (500 mg total) by mouth 2 (two) times a day for 4 days   naproxen (Naprosyn) 500 mg tablet   No No   Sig: Take 1 tablet (500 mg total) by mouth 2 (two) times a day with meals for 7 days   ondansetron (ZOFRAN-ODT) 4 mg disintegrating tablet  Self No No   Sig: Take 1 tablet (4 mg total) by mouth every 6 (six) hours as needed for nausea or vomiting   Patient not taking: Reported on 8/12/2024   triamcinolone (KENALOG) 0.1 % cream  Self No No   Sig: Apply topically 2 (two) times a day      Facility-Administered Medications: None     Patient's Medications   Discharge Prescriptions    No medications on file     No discharge procedures on file.  ED SEPSIS DOCUMENTATION   Time reflects when diagnosis was documented in both MDM as applicable and the Disposition within this note       Time User Action Codes Description Comment    3/10/2025 11:42 PM Cathleen Easton Add [T78.40XA] Allergic reaction, initial encounter     3/10/2025 11:43 PM Cathleen Easton Add [T78.2XXA] Anaphylaxis, initial encounter                  Cathleen Easton MD  03/11/25 0007

## 2025-03-11 NOTE — DISCHARGE INSTRUCTIONS
Continue Benadryl 50 mg per dose every 6-8 hours for the next 24 hours.  Use your epipen if this would happen again and come to the ER as soon as possible.

## 2025-03-14 ENCOUNTER — HOSPITAL ENCOUNTER (EMERGENCY)
Facility: HOSPITAL | Age: 30
Discharge: HOME | End: 2025-03-14
Attending: EMERGENCY MEDICINE
Payer: COMMERCIAL

## 2025-03-14 VITALS
TEMPERATURE: 99.5 F | DIASTOLIC BLOOD PRESSURE: 66 MMHG | WEIGHT: 183 LBS | BODY MASS INDEX: 33.47 KG/M2 | HEART RATE: 71 BPM | OXYGEN SATURATION: 99 % | SYSTOLIC BLOOD PRESSURE: 117 MMHG | RESPIRATION RATE: 18 BRPM

## 2025-03-14 DIAGNOSIS — E66.812 CLASS 2 OBESITY DUE TO EXCESS CALORIES WITHOUT SERIOUS COMORBIDITY WITH BODY MASS INDEX (BMI) OF 36.0 TO 36.9 IN ADULT: ICD-10-CM

## 2025-03-14 DIAGNOSIS — E66.09 CLASS 2 OBESITY DUE TO EXCESS CALORIES WITHOUT SERIOUS COMORBIDITY WITH BODY MASS INDEX (BMI) OF 36.0 TO 36.9 IN ADULT: ICD-10-CM

## 2025-03-14 DIAGNOSIS — T78.40XA ALLERGIC REACTION, INITIAL ENCOUNTER: Primary | ICD-10-CM

## 2025-03-14 PROCEDURE — 99283 EMERGENCY DEPT VISIT LOW MDM: CPT

## 2025-03-14 PROCEDURE — 99284 EMERGENCY DEPT VISIT MOD MDM: CPT | Performed by: EMERGENCY MEDICINE

## 2025-03-14 RX ORDER — EPINEPHRINE 0.3 MG/.3ML
0.3 INJECTION SUBCUTANEOUS ONCE
Qty: 0.6 ML | Refills: 0 | Status: SHIPPED | OUTPATIENT
Start: 2025-03-14 | End: 2025-03-14

## 2025-03-14 RX ORDER — DIPHENHYDRAMINE HCL 25 MG
25 TABLET ORAL ONCE
Status: COMPLETED | OUTPATIENT
Start: 2025-03-14 | End: 2025-03-14

## 2025-03-14 RX ORDER — FAMOTIDINE 20 MG/1
20 TABLET, FILM COATED ORAL ONCE
Status: COMPLETED | OUTPATIENT
Start: 2025-03-14 | End: 2025-03-14

## 2025-03-14 RX ORDER — DEXAMETHASONE 4 MG/1
10 TABLET ORAL ONCE
Status: COMPLETED | OUTPATIENT
Start: 2025-03-14 | End: 2025-03-14

## 2025-03-14 RX ADMIN — FAMOTIDINE 20 MG: 20 TABLET, FILM COATED ORAL at 20:06

## 2025-03-14 RX ADMIN — DEXAMETHASONE 10 MG: 4 TABLET ORAL at 20:06

## 2025-03-14 RX ADMIN — DIPHENHYDRAMINE HYDROCHLORIDE 25 MG: 25 TABLET ORAL at 20:06

## 2025-03-14 NOTE — ED PROVIDER NOTES
Time reflects when diagnosis was documented in both MDM as applicable and the Disposition within this note       Time User Action Codes Description Comment    3/14/2025  9:34 PM Carlos Perkins [T78.40XA] Allergic reaction, initial encounter     3/14/2025  9:35 PM Carlos Perkins Add [E66.812,  E66.09,  Z68.36] Class 2 obesity due to excess calories without serious comorbidity with body mass index (BMI) of 36.0 to 36.9 in adult           ED Disposition       ED Disposition   Discharge    Condition   Stable    Date/Time   Fri Mar 14, 2025  9:34 PM    Comment   Teresa Bear discharge to home/self care.                   Assessment & Plan       Medical Decision Making  Patient presenting for repeat evaluation of allergic reaction.  Patient had already self administered EpiPen with overall improvement of symptoms.  Patient well-appearing, hypertensive but otherwise normal vital signs, no objective abnormalities on physical exam but does continue to feel scratchy throat.  Treated with additional Benadryl, Decadron, Pepcid with plan for several hours of observation in the emergency department.  Patient well-appearing, minimally symptomatic on reassessment.  Prescribed additional EpiPen, provided with allergy follow-up, discharged with return precautions.    Amount and/or Complexity of Data Reviewed  External Data Reviewed: notes.     Details: Patient evaluated on 3/10/2025 in emergency department, evaluated for allergic reaction, treated with EpiPen with improvement of symptoms.    Risk  OTC drugs.  Prescription drug management.             Medications   famotidine (PEPCID) tablet 20 mg (20 mg Oral Given 3/14/25 2006)   diphenhydrAMINE (BENADRYL) tablet 25 mg (25 mg Oral Given 3/14/25 2006)   dexamethasone (DECADRON) tablet 10 mg (10 mg Oral Given 3/14/25 2006)       ED Risk Strat Scores                            SBIRT 20yo+      Flowsheet Row Most Recent Value   Initial Alcohol Screen: US AUDIT-C      1. How often do you have a drink containing alcohol? 0 Filed at: 2025   3b. FEMALE Any Age, or MALE 65+: How often do you have 4 or more drinks on one occassion? 0 Filed at: 2025   Audit-C Score 0 Filed at: 2025   BARBI: How many times in the past year have you...    Used an illegal drug or used a prescription medication for non-medical reasons? Never Filed at: 2025                            History of Present Illness       Chief Complaint   Patient presents with    Allergic Reaction     States she ate chocolate chip and immediately started with a allergic reaction PTA and gave herself a EPI pen and took Benadryl one tablet . States throat is itchy and feels like her face is swollen. Had allergic reaction on 3/10 to a chocolate pistachio , same chocolate, she attributed to pistachio that occurrence.        Past Medical History:   Diagnosis Date    Asthma     Eczema     Vitiligo       Past Surgical History:   Procedure Laterality Date     SECTION            Family History   Problem Relation Age of Onset    Eczema Mother     Eczema Maternal Grandfather     Asthma Maternal Grandfather       Social History     Tobacco Use    Smoking status: Former     Average packs/day: (0.1 ttl pk-yrs)     Types: Cigarettes, Cigars     Start date:     Smokeless tobacco: Never    Tobacco comments:     Report smokes a weekly cigar   Vaping Use    Vaping status: Some Days   Substance Use Topics    Alcohol use: Yes     Comment: occasional    Drug use: Not Currently     Frequency: 1.0 times per week     Types: Marijuana      E-Cigarette/Vaping    E-Cigarette Use Current Some Day User       E-Cigarette/Vaping Substances    Nicotine No     THC No     CBD No     Flavoring No     Other No     Unknown No       I have reviewed and agree with the history as documented.     Patient is a 29-year-old female presenting for evaluation of allergic reaction.  Patient states that she  had chocolate about an hour prior to coming to the emergency department and felt generalized pruritus, scratchy throat sensation, throat swelling sensation, and noted some puffiness of her face.  Patient self-administered an EpiPen and feels significant improvement of symptoms however continues to feel some mild throat scratchiness and generalized pruritus.  Patient denies shortness of breath, wheezing, nausea, vomit, abdominal pain.  Patient has similar reaction 4 days ago when eating pistachios dipped in chocolate but believed at the time that the reaction was to the pistachios and not the chocolate.        Review of Systems   HENT:  Negative for trouble swallowing and voice change.    Respiratory:  Negative for shortness of breath and wheezing.    Skin:  Negative for color change, pallor, rash and wound.   Neurological:  Negative for weakness and numbness.   All other systems reviewed and are negative.          Objective       ED Triage Vitals [03/14/25 1951]   Temperature Pulse Blood Pressure Respirations SpO2 Patient Position - Orthostatic VS   99.5 °F (37.5 °C) 83 157/96 18 98 % Sitting      Temp Source Heart Rate Source BP Location FiO2 (%) Pain Score    Tympanic Monitor Left arm -- --      Vitals      Date and Time Temp Pulse SpO2 Resp BP Pain Score FACES Pain Rating User   03/14/25 2030 -- 71 99 % 18 117/66 -- -- AM   03/14/25 1951 99.5 °F (37.5 °C) 83 98 % 18 157/96 -- -- SW            Physical Exam  Vitals and nursing note reviewed.   Constitutional:       General: She is not in acute distress.     Appearance: Normal appearance. She is not ill-appearing, toxic-appearing or diaphoretic.      Comments: Well-appearing, nontoxic, nondistressed   HENT:      Head: Normocephalic and atraumatic.      Comments: Moist mucous membranes.  No lip or tongue swelling.  No stridor.     Right Ear: External ear normal.      Left Ear: External ear normal.   Eyes:      General:         Right eye: No discharge.         Left  eye: No discharge.   Pulmonary:      Effort: No respiratory distress.      Comments: No increased work of breathing.  Speaking in complete sentences.  Lungs clear to auscultation bilaterally without wheezes, rales, rhonchi.  Satting 98% on room air indicating adequate oxygenation  Abdominal:      General: There is no distension.   Musculoskeletal:         General: No deformity.      Cervical back: Normal range of motion.   Skin:     Findings: No lesion or rash.      Comments: No visible urticaria   Neurological:      Mental Status: She is alert and oriented to person, place, and time. Mental status is at baseline.   Psychiatric:         Mood and Affect: Mood and affect normal.         Results Reviewed       None            No orders to display       Procedures    ED Medication and Procedure Management   Prior to Admission Medications   Prescriptions Last Dose Informant Patient Reported? Taking?   EPINEPHrine (EPIPEN) 0.3 mg/0.3 mL SOAJ  Self Yes No   Sig: inject 0.3 milliliters ( 0.3 milligrams ) intramuscularly in MIDD...  (REFER TO PRESCRIPTION NOTES).   albuterol (2.5 mg/3 mL) 0.083 % nebulizer solution  Self No No   Sig: Take 3 mL (2.5 mg total) by nebulization every 4 (four) hours as needed for wheezing or shortness of breath   Patient not taking: Reported on 8/27/2024   albuterol (Ventolin HFA) 90 mcg/act inhaler   No No   Sig: Inhale 2 puffs every 4 (four) hours as needed for wheezing   diphenhydramine, lidocaine, Al/Mg hydroxide, simethicone (Magic Mouthwash) SUSP  Self No No   Sig: Swish and spit 10 mL every 4 (four) hours as needed for mouth pain or discomfort   Patient not taking: Reported on 11/13/2024   fluticasone-salmeterol (AirDuo RespiClick 113/14) 113-14 mcg/act dry powder inhaler  Self No No   Sig: Inhale 1 puff 2 (two) times a day Rinse mouth after use.   methocarbamol (ROBAXIN) 500 mg tablet   No No   Sig: Take 1 tablet (500 mg total) by mouth 2 (two) times a day for 4 days   naproxen (Naprosyn)  500 mg tablet   No No   Sig: Take 1 tablet (500 mg total) by mouth 2 (two) times a day with meals for 7 days   ondansetron (ZOFRAN-ODT) 4 mg disintegrating tablet  Self No No   Sig: Take 1 tablet (4 mg total) by mouth every 6 (six) hours as needed for nausea or vomiting   Patient not taking: Reported on 8/12/2024   triamcinolone (KENALOG) 0.1 % cream  Self No No   Sig: Apply topically 2 (two) times a day      Facility-Administered Medications: None     Patient's Medications   Discharge Prescriptions    EPINEPHRINE (EPIPEN) 0.3 MG/0.3 ML SOAJ    Inject 0.3 mL (0.3 mg total) into a muscle once for 1 dose       Start Date: 3/14/2025 End Date: 3/14/2025       Order Dose: 0.3 mg       Quantity: 0.6 mL    Refills: 0     No discharge procedures on file.  ED SEPSIS DOCUMENTATION   Time reflects when diagnosis was documented in both MDM as applicable and the Disposition within this note       Time User Action Codes Description Comment    3/14/2025  9:34 PM Carlos Perkins [T78.40XA] Allergic reaction, initial encounter     3/14/2025  9:35 PM Carlos Perkins [E66.812,  E66.09,  Z68.36] Class 2 obesity due to excess calories without serious comorbidity with body mass index (BMI) of 36.0 to 36.9 in adult                  Carlos Perkins MD  03/14/25 [id number]

## 2025-03-15 NOTE — DISCHARGE INSTRUCTIONS
We have given you an additional prescription for an EpiPen.  If you have any severe worsening sensation of lip or tongue swelling, throat swelling, shortness of breath, self administer the EpiPen and return immediately to the emergency department.  We have given you information to follow-up with allergy in the next few months for reassessment.

## 2025-03-25 DIAGNOSIS — J45.30 MILD PERSISTENT ASTHMA WITHOUT COMPLICATION: ICD-10-CM

## 2025-03-25 RX ORDER — ALBUTEROL SULFATE 90 UG/1
2 INHALANT RESPIRATORY (INHALATION) EVERY 4 HOURS PRN
Qty: 8.5 G | Refills: 0 | Status: SHIPPED | OUTPATIENT
Start: 2025-03-25

## 2025-03-25 NOTE — TELEPHONE ENCOUNTER
Dr. Miller notified pt refusal for IV, lab notified will come draw for the labs   Reason for call:   [x] Refill   [] Prior Auth  [] Other:     Office:   [x] PCP/Provider - Jeremie Oh DO   [] Specialty/Provider -     Medication: albuterol (Ventolin HFA) 90 mcg/act inhaler /  Inhale 2 puffs every 4 (four) hours as needed for wheezing     Pharmacy: [id number] - [address] ( [address])     Local Pharmacy   Does the patient have enough for 3 days?   [] Yes   [x] No - Send as HP to POD

## 2025-04-23 DIAGNOSIS — J45.30 MILD PERSISTENT ASTHMA WITHOUT COMPLICATION: ICD-10-CM

## 2025-04-23 RX ORDER — FLUTICASONE PROPIONATE AND SALMETEROL 113; 14 UG/1; UG/1
1 POWDER, METERED RESPIRATORY (INHALATION) 2 TIMES DAILY
Qty: 1 EACH | Refills: 11 | Status: SHIPPED | OUTPATIENT
Start: 2025-04-23 | End: 2025-04-23

## 2025-04-23 RX ORDER — FLUTICASONE PROPIONATE AND SALMETEROL 113; 14 UG/1; UG/1
1 POWDER, METERED RESPIRATORY (INHALATION) 2 TIMES DAILY
Qty: 3.333 EACH | Refills: 11 | Status: SHIPPED | OUTPATIENT
Start: 2025-04-23

## 2025-04-23 NOTE — TELEPHONE ENCOUNTER
Fax received from Pharmacy requesting:    Fluticasone-Salmeterol 113-14    [facility]

## 2025-05-13 DIAGNOSIS — J45.30 MILD PERSISTENT ASTHMA WITHOUT COMPLICATION: ICD-10-CM

## 2025-05-13 RX ORDER — ALBUTEROL SULFATE 90 UG/1
INHALANT RESPIRATORY (INHALATION)
Qty: 8.5 G | Refills: 0 | OUTPATIENT
Start: 2025-05-13

## 2025-06-23 NOTE — ASSESSMENT & PLAN NOTE
Whole life history of severe eczema. Patient used to follow dermatology, but Dermatology stop taking her insurance. Patient also has history of MRSA that whenever she scratches her skin too much it turns into impetigo.    Will refill topical kenalog and start topical mupirocin for open wounds to prevent infection.   Will start Claritin 10mg daily to help reduce allergic reactions.  Will refer to allergist and dermatology. Patient was advised to call insurance to find which doctor is covered by insurance.    Orders:  •  Ambulatory Referral to Dermatology; Future  •  triamcinolone (KENALOG) 0.1 % cream; Apply topically 2 (two) times a day  •  Ambulatory Referral to Allergy; Future  •  mupirocin (BACTROBAN) 2 % ointment; Apply topically 3 (three) times a day  •  loratadine (CLARITIN) 10 mg tablet; Take 1 tablet (10 mg total) by mouth daily

## 2025-06-23 NOTE — PROGRESS NOTES
Name: Teresa Bear      : 1995      MRN: [id number]  Encounter Provider: Chinyere Oh MD  Encounter Date: 2025   Encounter department: [facility]  :  Assessment & Plan  Severe eczema  Whole life history of severe eczema. Patient used to follow dermatology, but Dermatology stop taking her insurance. Patient also has history of MRSA that whenever she scratches her skin too much it turns into impetigo.    Will refill topical kenalog and start topical mupirocin for open wounds to prevent infection.   Will start Claritin 10mg daily to help reduce allergic reactions.  Will refer to allergist and dermatology. Patient was advised to call insurance to find which doctor is covered by insurance.    Orders:  •  Ambulatory Referral to Dermatology; Future  •  triamcinolone (KENALOG) 0.1 % cream; Apply topically 2 (two) times a day  •  Ambulatory Referral to Allergy; Future  •  mupirocin (BACTROBAN) 2 % ointment; Apply topically 3 (three) times a day  •  loratadine (CLARITIN) 10 mg tablet; Take 1 tablet (10 mg total) by mouth daily    Need for hepatitis C screening test    Orders:  •  Hepatitis C antibody; Future    Screening for HIV (human immunodeficiency virus)    Orders:  •  HIV 1/2 AG/AB W REFLEX [facility] and [facility] only; Future    Encounter for immunization    Orders:  •  Pneumococcal Conjugate Vaccine 20-valent (Pcv20)    Skin infection    Orders:  •  mupirocin (BACTROBAN) 2 % ointment; Apply topically 3 (three) times a day    Mild persistent asthma without complication    Orders:  •  albuterol (Ventolin HFA) 90 mcg/act inhaler; Inhale 2 puffs every 4 (four) hours as needed for wheezing  •  loratadine (CLARITIN) 10 mg tablet; Take 1 tablet (10 mg total) by mouth daily           History of Present Illness {?Quick Links Encounters * My Last Note * Last Note in Specialty * Snapshot * Since Last Visit * History :07063}  Whole life history of severe eczema. Patient used to  follow dermatology, but Dermatology stop taking her insurance.      Review of Systems   Constitutional:  Negative for chills and fever.   HENT:  Negative for ear pain and sore throat.    Eyes:  Negative for pain and visual disturbance.   Respiratory:  Negative for cough and shortness of breath.    Cardiovascular:  Negative for chest pain and palpitations.   Gastrointestinal:  Negative for abdominal pain, constipation, diarrhea, nausea and vomiting.   Genitourinary:  Negative for dysuria and hematuria.   Musculoskeletal:  Negative for arthralgias, back pain, neck pain and neck stiffness.   Skin:  Positive for rash (severe ecxema all over the body). Negative for color change.   Neurological:  Negative for dizziness, weakness and headaches.   Psychiatric/Behavioral:  Negative for agitation and confusion. The patient is not nervous/anxious.    All other systems reviewed and are negative.      Objective {?Quick Links Trend Vitals * Enter New Vitals * Results Review * Timeline (Adult) * Labs * Imaging * Cardiology * Procedures * Lung Cancer Screening * Surgical eConsent :93074}  /72 (BP Location: Left arm, Patient Position: Sitting, Cuff Size: Standard)   Pulse 80   Temp 99.1 °F (37.3 °C)   Wt 80.5 kg (177 lb 8 oz)   SpO2 99%   BMI 32.47 kg/m²      Physical Exam  Vitals and nursing note reviewed.   Constitutional:       General: She is not in acute distress.     Appearance: Normal appearance. She is well-developed. She is not ill-appearing.   HENT:      Head: Normocephalic and atraumatic.      Right Ear: External ear normal.      Left Ear: External ear normal.      Nose: Nose normal. No congestion or rhinorrhea.      Mouth/Throat:      Mouth: Mucous membranes are moist.      Pharynx: Oropharynx is clear. No oropharyngeal exudate or posterior oropharyngeal erythema.     Eyes:      General:         Right eye: No discharge.         Left eye: No discharge.      Conjunctiva/sclera: Conjunctivae normal.        Cardiovascular:      Rate and Rhythm: Normal rate and regular rhythm.      Pulses: Normal pulses.      Heart sounds: Normal heart sounds. No murmur heard.     No friction rub. No gallop.   Pulmonary:      Effort: Pulmonary effort is normal. No respiratory distress.      Breath sounds: Normal breath sounds. No stridor. No wheezing, rhonchi or rales.   Chest:      Chest wall: No tenderness.   Abdominal:      General: Abdomen is flat. Bowel sounds are normal. There is no distension.      Palpations: Abdomen is soft.      Tenderness: There is no abdominal tenderness. There is no guarding.     Musculoskeletal:         General: No swelling, tenderness, deformity or signs of injury. Normal range of motion.      Cervical back: Normal range of motion and neck supple. No rigidity or tenderness.      Right lower leg: No edema.      Left lower leg: No edema.   Lymphadenopathy:      Cervical: No cervical adenopathy.     Skin:     General: Skin is warm and dry.      Capillary Refill: Capillary refill takes less than 2 seconds.      Findings: Lesion (vitiligo on hands, feet, legs and arms) and rash (severe eczema all over the body, no signs of infection) present. No bruising or erythema.     Neurological:      General: No focal deficit present.      Mental Status: She is alert and oriented to person, place, and time. Mental status is at baseline.      Motor: No weakness.      Gait: Gait normal.      Deep Tendon Reflexes: Reflexes normal.     Psychiatric:         Mood and Affect: Mood normal.         Behavior: Behavior normal.         Thought Content: Thought content normal.

## 2025-06-24 ENCOUNTER — OFFICE VISIT (OUTPATIENT)
Age: 30
End: 2025-06-24

## 2025-06-24 VITALS
TEMPERATURE: 99.1 F | OXYGEN SATURATION: 99 % | WEIGHT: 177.5 LBS | BODY MASS INDEX: 32.47 KG/M2 | DIASTOLIC BLOOD PRESSURE: 72 MMHG | HEART RATE: 80 BPM | SYSTOLIC BLOOD PRESSURE: 112 MMHG

## 2025-06-24 DIAGNOSIS — L30.9 SEVERE ECZEMA: Primary | ICD-10-CM

## 2025-06-24 DIAGNOSIS — J45.30 MILD PERSISTENT ASTHMA WITHOUT COMPLICATION: ICD-10-CM

## 2025-06-24 DIAGNOSIS — L08.9 SKIN INFECTION: ICD-10-CM

## 2025-06-24 DIAGNOSIS — Z11.4 SCREENING FOR HIV (HUMAN IMMUNODEFICIENCY VIRUS): ICD-10-CM

## 2025-06-24 DIAGNOSIS — Z23 ENCOUNTER FOR IMMUNIZATION: ICD-10-CM

## 2025-06-24 DIAGNOSIS — Z11.59 NEED FOR HEPATITIS C SCREENING TEST: ICD-10-CM

## 2025-06-24 PROCEDURE — 90471 IMMUNIZATION ADMIN: CPT | Performed by: FAMILY MEDICINE

## 2025-06-24 PROCEDURE — 99213 OFFICE O/P EST LOW 20 MIN: CPT | Performed by: FAMILY MEDICINE

## 2025-06-24 PROCEDURE — 90677 PCV20 VACCINE IM: CPT | Performed by: FAMILY MEDICINE

## 2025-06-24 RX ORDER — TRIAMCINOLONE ACETONIDE 1 MG/G
CREAM TOPICAL 2 TIMES DAILY
Qty: 80 G | Refills: 2 | Status: SHIPPED | OUTPATIENT
Start: 2025-06-24

## 2025-06-24 RX ORDER — ALBUTEROL SULFATE 90 UG/1
2 INHALANT RESPIRATORY (INHALATION) EVERY 4 HOURS PRN
Qty: 8 G | Refills: 2 | Status: SHIPPED | OUTPATIENT
Start: 2025-06-24

## 2025-06-24 RX ORDER — MUPIROCIN 2 %
OINTMENT (GRAM) TOPICAL 3 TIMES DAILY
Qty: 30 G | Refills: 2 | Status: SHIPPED | OUTPATIENT
Start: 2025-06-24

## 2025-06-24 RX ORDER — LORATADINE 10 MG/1
10 TABLET ORAL DAILY
Qty: 100 TABLET | Refills: 3 | Status: SHIPPED | OUTPATIENT
Start: 2025-06-24

## 2025-06-24 NOTE — ASSESSMENT & PLAN NOTE
Orders:  •  albuterol (Ventolin HFA) 90 mcg/act inhaler; Inhale 2 puffs every 4 (four) hours as needed for wheezing  •  loratadine (CLARITIN) 10 mg tablet; Take 1 tablet (10 mg total) by mouth daily

## 2025-06-25 RX ORDER — ALBUTEROL SULFATE 90 UG/1
INHALANT RESPIRATORY (INHALATION)
Qty: 8.5 G | Refills: 0 | OUTPATIENT
Start: 2025-06-25

## 2025-06-28 ENCOUNTER — HOSPITAL ENCOUNTER (EMERGENCY)
Facility: HOSPITAL | Age: 30
Discharge: HOME | End: 2025-06-28
Attending: EMERGENCY MEDICINE | Admitting: EMERGENCY MEDICINE
Payer: COMMERCIAL

## 2025-06-28 ENCOUNTER — APPOINTMENT (EMERGENCY)
Dept: RADIOLOGY | Facility: HOSPITAL | Age: 30
End: 2025-06-28
Payer: COMMERCIAL

## 2025-06-28 VITALS
TEMPERATURE: 99.6 F | HEART RATE: 62 BPM | RESPIRATION RATE: 15 BRPM | WEIGHT: 178 LBS | OXYGEN SATURATION: 100 % | BODY MASS INDEX: 32.56 KG/M2 | SYSTOLIC BLOOD PRESSURE: 134 MMHG | DIASTOLIC BLOOD PRESSURE: 67 MMHG

## 2025-06-28 DIAGNOSIS — R55 NEAR SYNCOPE: Primary | ICD-10-CM

## 2025-06-28 DIAGNOSIS — E83.42 HYPOMAGNESEMIA: ICD-10-CM

## 2025-06-28 LAB
ALBUMIN SERPL BCG-MCNC: 4 G/DL (ref 3.5–5)
ALP SERPL-CCNC: 44 U/L (ref 34–104)
ALT SERPL W P-5'-P-CCNC: 11 U/L (ref 7–52)
ANION GAP SERPL CALCULATED.3IONS-SCNC: 8 MMOL/L (ref 4–13)
APTT PPP: 30 SECONDS (ref 23–34)
AST SERPL W P-5'-P-CCNC: 19 U/L (ref 13–39)
ATRIAL RATE: 61 BPM
BASOPHILS # BLD AUTO: 0.05 THOUSANDS/ÂΜL (ref 0–0.1)
BASOPHILS NFR BLD AUTO: 1 % (ref 0–1)
BILIRUB SERPL-MCNC: 0.74 MG/DL (ref 0.2–1)
BUN SERPL-MCNC: 7 MG/DL (ref 5–25)
CALCIUM SERPL-MCNC: 8.8 MG/DL (ref 8.4–10.2)
CARDIAC TROPONIN I PNL SERPL HS: 3 NG/L (ref ?–50)
CHLORIDE SERPL-SCNC: 106 MMOL/L (ref 96–108)
CO2 SERPL-SCNC: 24 MMOL/L (ref 21–32)
CREAT SERPL-MCNC: 0.62 MG/DL (ref 0.6–1.3)
D DIMER PPP FEU-MCNC: 0.47 UG/ML FEU
EOSINOPHIL # BLD AUTO: 0.6 THOUSAND/ÂΜL (ref 0–0.61)
EOSINOPHIL NFR BLD AUTO: 12 % (ref 0–6)
ERYTHROCYTE [DISTWIDTH] IN BLOOD BY AUTOMATED COUNT: 13.3 % (ref 11.6–15.1)
GFR SERPL CREATININE-BSD FRML MDRD: 122 ML/MIN/1.73SQ M
GLUCOSE SERPL-MCNC: 83 MG/DL (ref 65–140)
GLUCOSE SERPL-MCNC: 83 MG/DL (ref 65–140)
HCG SERPL QL: NEGATIVE
HCT VFR BLD AUTO: 35.3 % (ref 34.8–46.1)
HGB BLD-MCNC: 11.3 G/DL (ref 11.5–15.4)
IMM GRANULOCYTES # BLD AUTO: 0.01 THOUSAND/UL (ref 0–0.2)
IMM GRANULOCYTES NFR BLD AUTO: 0 % (ref 0–2)
INR PPP: 1 (ref 0.85–1.19)
LYMPHOCYTES # BLD AUTO: 1.17 THOUSANDS/ÂΜL (ref 0.6–4.47)
LYMPHOCYTES NFR BLD AUTO: 23 % (ref 14–44)
MAGNESIUM SERPL-MCNC: 1.8 MG/DL (ref 1.9–2.7)
MCH RBC QN AUTO: 26.2 PG (ref 26.8–34.3)
MCHC RBC AUTO-ENTMCNC: 32 G/DL (ref 31.4–37.4)
MCV RBC AUTO: 82 FL (ref 82–98)
MONOCYTES # BLD AUTO: 0.3 THOUSAND/ÂΜL (ref 0.17–1.22)
MONOCYTES NFR BLD AUTO: 6 % (ref 4–12)
NEUTROPHILS # BLD AUTO: 2.96 THOUSANDS/ÂΜL (ref 1.85–7.62)
NEUTS SEG NFR BLD AUTO: 58 % (ref 43–75)
NRBC BLD AUTO-RTO: 0 /100 WBCS
P AXIS: 67 DEGREES
PLATELET # BLD AUTO: 242 THOUSANDS/UL (ref 149–390)
PMV BLD AUTO: 10.7 FL (ref 8.9–12.7)
POTASSIUM SERPL-SCNC: 3.7 MMOL/L (ref 3.5–5.3)
PR INTERVAL: 182 MS
PROT SERPL-MCNC: 7.1 G/DL (ref 6.4–8.4)
PROTHROMBIN TIME: 13.7 SECONDS (ref 12.3–15)
QRS AXIS: 79 DEGREES
QRSD INTERVAL: 70 MS
QT INTERVAL: 418 MS
QTC INTERVAL: 420 MS
RBC # BLD AUTO: 4.32 MILLION/UL (ref 3.81–5.12)
SODIUM SERPL-SCNC: 138 MMOL/L (ref 135–147)
T WAVE AXIS: 62 DEGREES
TSH SERPL DL<=0.05 MIU/L-ACNC: 0.58 UIU/ML (ref 0.45–4.5)
VENTRICULAR RATE: 61 BPM
WBC # BLD AUTO: 5.09 THOUSAND/UL (ref 4.31–10.16)

## 2025-06-28 PROCEDURE — 70498 CT ANGIOGRAPHY NECK: CPT

## 2025-06-28 PROCEDURE — 83735 ASSAY OF MAGNESIUM: CPT | Performed by: PHYSICIAN ASSISTANT

## 2025-06-28 PROCEDURE — 96375 TX/PRO/DX INJ NEW DRUG ADDON: CPT

## 2025-06-28 PROCEDURE — 96365 THER/PROPH/DIAG IV INF INIT: CPT

## 2025-06-28 PROCEDURE — 36415 COLL VENOUS BLD VENIPUNCTURE: CPT | Performed by: PHYSICIAN ASSISTANT

## 2025-06-28 PROCEDURE — 96367 TX/PROPH/DG ADDL SEQ IV INF: CPT

## 2025-06-28 PROCEDURE — 84703 CHORIONIC GONADOTROPIN ASSAY: CPT | Performed by: PHYSICIAN ASSISTANT

## 2025-06-28 PROCEDURE — 85610 PROTHROMBIN TIME: CPT | Performed by: PHYSICIAN ASSISTANT

## 2025-06-28 PROCEDURE — 99285 EMERGENCY DEPT VISIT HI MDM: CPT | Performed by: PHYSICIAN ASSISTANT

## 2025-06-28 PROCEDURE — 70496 CT ANGIOGRAPHY HEAD: CPT

## 2025-06-28 PROCEDURE — 99284 EMERGENCY DEPT VISIT MOD MDM: CPT

## 2025-06-28 PROCEDURE — 85025 COMPLETE CBC W/AUTO DIFF WBC: CPT | Performed by: PHYSICIAN ASSISTANT

## 2025-06-28 PROCEDURE — 93005 ELECTROCARDIOGRAM TRACING: CPT

## 2025-06-28 PROCEDURE — 85379 FIBRIN DEGRADATION QUANT: CPT | Performed by: PHYSICIAN ASSISTANT

## 2025-06-28 PROCEDURE — 82948 REAGENT STRIP/BLOOD GLUCOSE: CPT

## 2025-06-28 PROCEDURE — 93010 ELECTROCARDIOGRAM REPORT: CPT | Performed by: INTERNAL MEDICINE

## 2025-06-28 PROCEDURE — 84443 ASSAY THYROID STIM HORMONE: CPT | Performed by: PHYSICIAN ASSISTANT

## 2025-06-28 PROCEDURE — 84484 ASSAY OF TROPONIN QUANT: CPT | Performed by: PHYSICIAN ASSISTANT

## 2025-06-28 PROCEDURE — 80053 COMPREHEN METABOLIC PANEL: CPT | Performed by: PHYSICIAN ASSISTANT

## 2025-06-28 PROCEDURE — 85730 THROMBOPLASTIN TIME PARTIAL: CPT | Performed by: PHYSICIAN ASSISTANT

## 2025-06-28 RX ORDER — ACETAMINOPHEN 10 MG/ML
1000 INJECTION, SOLUTION INTRAVENOUS ONCE
Status: COMPLETED | OUTPATIENT
Start: 2025-06-28 | End: 2025-06-28

## 2025-06-28 RX ORDER — DEXAMETHASONE SODIUM PHOSPHATE 10 MG/ML
10 INJECTION, SOLUTION INTRAMUSCULAR; INTRAVENOUS ONCE
Status: COMPLETED | OUTPATIENT
Start: 2025-06-28 | End: 2025-06-28

## 2025-06-28 RX ORDER — MAGNESIUM SULFATE HEPTAHYDRATE 40 MG/ML
2 INJECTION, SOLUTION INTRAVENOUS ONCE
Status: COMPLETED | OUTPATIENT
Start: 2025-06-28 | End: 2025-06-28

## 2025-06-28 RX ORDER — ONDANSETRON 2 MG/ML
4 INJECTION INTRAMUSCULAR; INTRAVENOUS ONCE
Status: COMPLETED | OUTPATIENT
Start: 2025-06-28 | End: 2025-06-28

## 2025-06-28 RX ADMIN — MAGNESIUM SULFATE HEPTAHYDRATE 2 G: 40 INJECTION, SOLUTION INTRAVENOUS at 16:45

## 2025-06-28 RX ADMIN — ONDANSETRON 4 MG: 2 INJECTION INTRAMUSCULAR; INTRAVENOUS at 16:10

## 2025-06-28 RX ADMIN — SODIUM CHLORIDE 1000 ML: 0.9 INJECTION, SOLUTION INTRAVENOUS at 16:05

## 2025-06-28 RX ADMIN — ACETAMINOPHEN 1000 MG: 10 INJECTION INTRAVENOUS at 16:13

## 2025-06-28 RX ADMIN — IOHEXOL 80 ML: 350 INJECTION, SOLUTION INTRAVENOUS at 18:05

## 2025-06-28 RX ADMIN — DEXAMETHASONE SODIUM PHOSPHATE 10 MG: 10 INJECTION, SOLUTION INTRAMUSCULAR; INTRAVENOUS at 16:12

## 2025-06-28 NOTE — DISCHARGE INSTRUCTIONS
"Patient Education     Diet and health   The Basics   Written by the doctors and editors at [facility]   Why is it important to eat a healthy diet? -- It's important to eat a healthy diet because eating the right foods can keep you healthy now and later in life. It can lower the risk of problems like heart disease, diabetes, high blood pressure, and some types of cancer. It can also help you live longer and improve your quality of life.  What kind of diet is best? -- There is no 1 specific diet that experts recommend for everyone. People choose what foods to eat for many different reasons. These include personal preference, culture, Baptism, allergies or intolerances, and nutritional goals. People also need to consider the cost and availability of different foods.  In general, experts recommend a diet that:   Includes lots of vegetables, fruits, beans, nuts, and whole grains   Limits red and processed meats, unhealthy fats, sugar, salt, and alcohol  What are dietary patterns? -- A dietary \"pattern\" means generally eating certain types of foods while limiting others. Some people need to follow a specific dietary pattern because of their health needs. For example, if you have high blood pressure, your doctor might recommend a diet low in salt.  If you are trying to improve your health in general, choosing a healthy dietary pattern can help. This does not have to mean being very strict about what you eat or avoid. The goal is to think about getting plenty of healthy foods while limiting less healthy foods.  Examples of dietary patterns include:   Mediterranean diet - This involves eating a lot of fruits, vegetables, nuts, and whole grains, and uses olive oil instead of other fats. It also includes some fish, poultry, and dairy products, but not a lot of red meat. Following this diet can help your overall health, and might even lower your risk of having a stroke.   Plant-based diets - These patterns focus on vegetables, " "fruits, grains, beans, and nuts. They limit or avoid food that comes from animals, such as meat and dairy. There are different types of plant-based diets, including vegetarian and vegan.   Low-fat diet - A low-fat diet involves limiting calories from fat. This might help some people keep weight off if that is their goal, but it does not have many other health benefits. If you choose to follow a low-fat diet, it is also important to focus on getting lots of whole grains, legumes, fruits, and vegetables. Limit refined grains and sugar.   Low-cholesterol diet - Cholesterol is found in foods with a lot of saturated fat, like red meat, butter, and cheese. A low-cholesterol diet focuses on limiting the amount of cholesterol that you eat. Limiting the cholesterol in your diet can also help lower the amount of unhealthy fats that you eat.  Which foods are especially healthy? -- Foods that are especially healthy include:   Fruits and vegetables - Eating a diet with lots of fruits and vegetables can help prevent heart disease and stroke. It might also help prevent certain types of cancer. Try to eat fruits and vegetables at each meal and also for snacks. If you don't have fresh fruits and vegetables available, you can eat frozen or canned ones instead. Doctors recommend eating at least 5 servings of fruits or vegetables each day.   Whole grains - Whole-grain foods include 100 percent whole-wheat bread, steel cut oats, and whole-grain pasta. These are healthier than foods made with \"refined\" grains, like white bread and white rice. Eating lots of whole grains instead of refined grains has been shown to help with weight control. It can also lower the risk of several health problems, including colon cancer, heart disease, and diabetes. Doctors recommend that most people try to eat 5 to 8 servings of whole-grain, high-fiber foods each day.   Foods with fiber - Eating foods with a lot of fiber can help prevent heart disease and " "stroke. If you have type 2 diabetes, it can also help control your blood sugar. Foods that have a lot of fiber include vegetables, fruits, beans, nuts, oatmeal, and whole-grain breads and cereals. You can tell how much fiber is in a food by reading the nutrition label (figure 1). Doctors recommend that most people eat about 25 to 34 grams of fiber each day.   Foods with calcium and vitamin D - Babies, children, and adults need calcium and vitamin D to help keep their bones strong. Adults also need calcium and vitamin D to help prevent osteoporosis. Osteoporosis is a condition that causes bones to get \"thin\" and break more easily than usual. Different foods and drinks have calcium and vitamin D in them (figure 2). People who don't get enough calcium and vitamin D in their diet might need to take a supplement. Doctors recommend that most people have 2 to 3 servings of foods with calcium and vitamin D each day.   Foods with protein - Protein helps your muscles and bones stay strong. Healthy foods with a lot of protein include chicken, fish, eggs, beans, nuts, and soy products. Red meat also has a lot of protein, but it also contains fats, which can be unhealthy. Doctors recommend that most people try to eat about 5 servings of protein each day.   Healthy fats - There are different types of fats. Some types of fats are better for your body than others. Healthy fats are \"monounsaturated\" or \"polyunsaturated\" fats. These are found in fatty fish, nuts and nut butters, and avocados. Use plant-based oils when cooking. Examples of these oils include olive, canola, safflower, sunflower, and corn oil. Eating foods with healthy fats, while avoiding or limiting foods with unhealthy fats, might lower the risk of heart disease.   Foods with folate - Folate is a vitamin that is important for pregnant people, since it helps prevent certain birth defects. It is also called \"folic acid.\" Anyone who could get pregnant should get at " "least 400 micrograms of folic acid daily, whether or not they are actively trying to get pregnant. Folate is found in many breakfast cereals, oranges, orange juice, and green leafy vegetables.  What foods should I avoid or limit? -- To eat a healthy diet, there are some things that you should avoid or limit. They include:   Unhealthy fats - \"Trans\" fats are especially unhealthy. They are found in margarines, many fast foods, and some store-bought baked goods. \"Saturated\" fats are found in animal products like meats, egg yolks, butter, cheese, and full-fat milk products. Unhealthy fats can raise your cholesterol level and increase your chance of getting heart disease.   Sugar - To have a healthy diet, it's important to limit or avoid added sugar, sweets, and refined grains. Refined grains are found in white bread, white rice, most pastas, and most packaged \"snack\" foods.  Avoiding sugar-sweetened beverages, like soda and sports drinks, can also help improve your health.  Avoid canned fruits in \"heavy\" syrup.   Red and processed meats - Studies have shown that eating a lot of red meat can increase your risk of certain health problems, including heart disease and cancer. You should limit the amount of red meat that you eat. This is also true for processed meats like sausage, hot dogs, and cabrera.  Can I drink alcohol as part of a healthy diet? -- Not drinking alcohol at all is the healthiest choice. Regular drinking can raise a person's chances of getting liver disease and certain types of cancers. In females, even 1 drink a day can increase the risk of getting breast cancer.  If you do choose to drink, most doctors recommend limiting alcohol to no more than:   1 drink a day for females   2 drinks a day for males  The limits are different because, generally, the female body takes longer to break down alcohol.  How many calories do I need each day? -- Calories give your body energy. The number of calories that you need " "each day depends on your weight, height, age, sex, and how active you are.  Your doctor or nurse can tell you about how many calories you should eat each day. You can also work with a dietitian (nutrition expert) to learn more about your dietary needs and options.  What if I am having trouble improving my diet? -- It can be hard to change the way that you eat. Remember that even small changes can improve your health.  Here are some tips that might help:   Try to make fruits and vegetables part of every meal. If you don't have fresh fruits and vegetables, frozen or canned are good options. Look for products without added salt or sugar.   Keep a bowl of fruit out for snacking.   When you can, choose whole grains instead of refined grains. Choose chicken, fish, and beans instead of red meat and cheese.   Try to eat prepared and processed foods less often.   Try flavored seltzer or water instead of soda or juice.   When eating at fast food restaurants, look for healthier items, like broiled chicken or salad.  If you have questions about which foods you should or should not eat, ask your doctor, nurse, or dietitian. The right diet for you will depend, in part, on your health and any medical conditions you have.  All topics are updated as new evidence becomes available and our peer review process is complete.  This topic retrieved from [employer] on: Feb 28, 2024.  Topic 38679 Version 28.0  Release: 32.2.4 - C32.58  © 2024 UpToDate, Inc. and/or its affiliates. All rights reserved.  figure 1: Nutrition label - Fiber     This is an example of a nutrition label. To figure out how much fiber is in a food, look for the line that says \"Dietary Fiber.\" It's also important to look at the serving size. This food has 7 grams of fiber in each serving, and each serving is 1 cup.  Graphic 54783 Version 8.0  figure 2: Foods and drinks with calcium and vitamin D     Foods rich in calcium include ice cream, soy milk, breads, kale, " "broccoli, milk, cheese, cottage cheese, almonds, yogurt, ready-to-eat cereals, beans, and tofu. Foods rich in vitamin D include milk, fortified plant-based \"milks\" (soy, almond), canned tuna fish, cod liver oil, yogurt, ready-to-eat-cereals, cooked salmon, canned sardines, mackerel, and eggs. Some of these foods are rich in both.  Graphic 58144 Version 4.0  Consumer Information Use and Disclaimer   Disclaimer: This generalized information is a limited summary of diagnosis, treatment, and/or medication information. It is not meant to be comprehensive and should be used as a tool to help the user understand and/or assess potential diagnostic and treatment options. It does NOT include all information about conditions, treatments, medications, side effects, or risks that may apply to a specific patient. It is not intended to be medical advice or a substitute for the medical advice, diagnosis, or treatment of a health care provider based on the health care provider's examination and assessment of a patient's specific and unique circumstances. Patients must speak with a health care provider for complete information about their health, medical questions, and treatment options, including any risks or benefits regarding use of medications. This information does not endorse any treatments or medications as safe, effective, or approved for treating a specific patient. UpToDate, Inc. and its affiliates disclaim any warranty or liability relating to this information or the use thereof.The use of this information is governed by the Terms of Use, available at https://www.wolterskluwer.com/en/know/clinical-effectiveness-terms. 2024© UpToDate, Inc. and its affiliates and/or licensors. All rights reserved.  Copyright   © 2024 Up[employer]Date, Inc. and/or its affiliates. All rights reserved.  Patient Education     Syncope (Fainting) Discharge Instructions   About this topic   The medical term for fainting is syncope. Fainting happens when " your brain does not get enough blood for a short period of time and you pass out or almost pass out.  Things that can cause you to faint include:  Standing too long in one place.  Standing up too quickly, especially if you have not had enough to drink or eat.  Strong emotions like fear.  Sudden pain like getting a blood test or a shot.  Taking certain medicines.  Sometimes, a serious condition like a heart problem, may cause you to faint. You may get hurt if you fall or are driving when it happens.       What care is needed at home?   Ask your doctor what you need to do when you go home. Make sure you ask questions if you do not understand what the doctor says. This way you will know what you need to do.  Avoid moving quickly from sitting or lying down to standing up.  Sit on the edge of the bed for a few minutes before you stand up. When you stand up, walk slowly at first.  Move your legs often if you need to sit or  one position for a long time.  If the doctors think you may be dehydrated, be sure to drink extra fluids, even if you are not thirsty.  Try to eat regular meals throughout the day.  Sit or lie down right away if you feel faint or dizzy.  Take extra care to protect yourself from falls. Use handrails and walk slowly.  Avoid driving when you feel faint. If you feel faint while driving, be sure to stop and pull over right away.  What follow-up care is needed?   Your doctor may ask you to make visits to the office to check on your progress. Be sure to keep these visits. Your doctor may order tests like blood test or an ECG to check your heart. Be sure to keep these visits and follow up with your doctor for results.  What drugs may be needed?   The doctor may order drugs to:  Help with dizziness  Treat an upset stomach  Help with blood pressure  Control heart rhythm  Will physical activity be limited?   Physical activities may be limited. Some strenuous activities may cause fainting.  When do I need  to call the doctor?   You faint or feel like you will faint and also have any of the following:  Bad chest discomfort or severe trouble breathing.  Your heart feels like it is beating very fast, very slow, or abnormally.  You have a seizure.  You have new weakness in your arms or legs.  You develop trouble speaking, swallowing, seeing, or hearing.  You have another fainting event.  You hit your head when you faint.  Teach Back: Helping You Understand   The Teach Back Method helps you understand the information we are giving you. After you talk with the staff, tell them in your own words what you learned. This helps to make sure the staff has described each thing clearly. It also helps to explain things that may have been confusing. Before going home, make sure you can do these:  I can tell you about my condition.  I can tell you what I will do to help me stay safe when moving about.  I can tell you what I will do if I have numbness or weakness in the face, arms, or legs.  Last Reviewed Date   2021-06-07  Consumer Information Use and Disclaimer   This generalized information is a limited summary of diagnosis, treatment, and/or medication information. It is not meant to be comprehensive and should be used as a tool to help the user understand and/or assess potential diagnostic and treatment options. It does NOT include all information about conditions, treatments, medications, side effects, or risks that may apply to a specific patient. It is not intended to be medical advice or a substitute for the medical advice, diagnosis, or treatment of a health care provider based on the health care provider's examination and assessment of a patient’s specific and unique circumstances. Patients must speak with a health care provider for complete information about their health, medical questions, and treatment options, including any risks or benefits regarding use of medications. This information does not endorse any treatments  or medications as safe, effective, or approved for treating a specific patient. UpToDate, Inc. and its affiliates disclaim any warranty or liability relating to this information or the use thereof. The use of this information is governed by the Terms of Use, available at https://www.[employer].com/en/know/clinical-effectiveness-terms   Copyright   Copyright © 2024 UpToDate, Inc. and its affiliates and/or licensors. All rights reserved.

## 2025-06-28 NOTE — ED PROVIDER NOTES
"Time reflects when diagnosis was documented in both MDM as applicable and the Disposition within this note       Time User Action Codes Description Comment    2025  6:59 PM Rivera Gonzales [R55] Near syncope     2025  6:59 PM Rivera Gonzales [E83.42] Hypomagnesemia           ED Disposition       None          Assessment & Plan       Medical Decision Making  Patient is a 30-year-old female with a history of asthma, surgical history of  section that presents to the emergency department with syncopal episode approximately 2 hours ago.  Patient has associate symptomatology of dull aching nonradiating frontal head pain with radiation to right side of head a couple days prior to [place] ED presentation.    Patient hemodynamically stable and afebrile  No sirs; GCS 15 alert and oriented x 3, no acute focal neurologic events deficits; NIH stroke scale 0  Hypomagnesia 1.8-repleted  normal kidney function, normal serum glucose  Negative hCG  ECG normal sinus rhythm, negative troponin, doubt ACS component  Negative D-dimer, doubt pulmonary embolism  No leukocytosis, no bandemia, doubt infectious etiology; unremarkable H&H, appears to be at patient baseline  TSH normal  CTA head and neck-impression-\"    Delivered Tylenol, Decadron, Zofran in the emergency department; patient demonstrates decrease in presenting head pain and nausea ED symptomatology status post medication delivery  Ddx likely and not limited to migraine/complex migraine, TIA, seizure, hypoglycemia, hypovolemia/hyponatremia, cardiac arrhythmia  Will treat for near syncope vs migraine vs mild malnutrition    Work note delivered   Signout to Dr. Wei, pending CTA head and neck with likely disposition to discharge home,  *Due to voice recognition software, sound alike and misspelled words may be contained in the documentation*    Amount and/or Complexity of Data Reviewed  Labs: ordered. Decision-making details documented in ED " Course.  Radiology: ordered and independent interpretation performed. Decision-making details documented in ED Course.  ECG/medicine tests: ordered and independent interpretation performed. Decision-making details documented in ED Course.    Risk  Prescription drug management.        ED Course as of 06/28/25 1918   Sat Jun 28, 2025   1901 Pt denies dysuria sx     1915 Sign out to Dr. Wei, pending head/neck cta       Medications   iohexol (OMNIPAQUE) 350 MG/ML injection (MULTI-DOSE) 100 mL ( Intravenous Not Given 6/28/25 1844)   sodium chloride 0.9 % bolus 1,000 mL (0 mL Intravenous Stopped 6/28/25 1745)   ondansetron (ZOFRAN) injection 4 mg (4 mg Intravenous Given 6/28/25 1610)   magnesium sulfate 2 g/50 mL IVPB (premix) 2 g (0 g Intravenous Stopped 6/28/25 1745)   dexamethasone (PF) (DECADRON) injection 10 mg (10 mg Intravenous Given 6/28/25 1612)   acetaminophen (Ofirmev) injection 1,000 mg (0 mg Intravenous Stopped 6/28/25 1643)   iohexol (OMNIPAQUE) 350 MG/ML injection (MULTI-DOSE) 80 mL (80 mL Intravenous Given 6/28/25 1805)       ED Risk Strat Scores         Stroke Assessment       Row Name 06/28/25 1620             NIH Stroke Scale    Interval --      Level of Consciousness (1a.) 0      LOC Questions (1b.) 0      LOC Commands (1c.) 0      Best Gaze (2.) 0      Visual (3.) 0      Facial Palsy (4.) 0      Motor Arm, Left (5a.) 0      Motor Arm, Right (5b.) 0      Motor Leg, Left (6a.) 0      Motor Leg, Right (6b.) 0      Limb Ataxia (7.) 0      Sensory (8.) 0      Best Language (9.) 0      Dysarthria (10.) 0      Extinction and Inattention (11.) (Formerly Neglect) 0      Total 0                              No data recorded        SBIRT 20yo+      Flowsheet Row Most Recent Value   Initial Alcohol Screen: US AUDIT-C     1. How often do you have a drink containing alcohol? 0 Filed at: 06/28/2025 1531   2. How many drinks containing alcohol do you have on a typical day you are drinking?  0 Filed at: 06/28/2025  "153   3a. Male UNDER 65: How often do you have five or more drinks on one occasion? 0 Filed at: 2025   3b. FEMALE Any Age, or MALE 65+: How often do you have 4 or more drinks on one occassion? 0 Filed at: 2025   Audit-C Score 0 Filed at: 2025                            History of Present Illness       Chief Complaint   Patient presents with    Syncope     Was at work today and passed out today. States she felt shaky and had a headache with nausea before it happened. This is the second time this week that this has happened.      Patient is a 30-year-old female with a history of asthma, surgical history of  section that presents to the emergency department with syncopal episode approximately 2 hours ago.  Patient has associate symptomatology of dull aching nonradiating frontal head pain with radiation to right side of head a couple days prior to [place] ED presentation.  Patient does not offer remote history of migraine headaches but current head pain symptoms do not resemble those previously experienced.  Patient denies thunderclap headache.  Patient does report that she works at the [employer] and while she was on her driving earlier today, the above symptoms had occurred and she quickly pulled off to the side of the road and \"almost passing out episode happened when I was parked on the shoulder.\"  Patient denies history of TIA or strokes.  Patient has history of concussion.  Patient denies illicit drug use and other OTC medications.  Patient denies numbness, tingling and loss of power in any or all extremities.  Patient denies palliative factors and provocative factors.  Patient denies noneffective treatment.  Patient denies fevers, chills, vomiting, diarrhea, constipation and urinary symptoms.  Patient denies recent fall recent trauma.  Patient denies sick contacts and recent travel.  Patient does report that she has decreased dietary intake over the past week, \"I been " "working too much.  But I am drinking my electrolytes.\"  Patient denies chest pain, shortness of breath, and abdominal pain.    Past Medical History[1]   Past Surgical History[2]   Family History[3]   Social History[4]   E-Cigarette/Vaping    E-Cigarette Use Current Some Day User       E-Cigarette/Vaping Substances    Nicotine No     THC No     CBD No     Flavoring No     Other No     Unknown No       I have reviewed and agree with the history as documented.       History provided by:  Patient   used: No    Syncope  Associated symptoms: headaches    Associated symptoms: no chest pain, no confusion, no dizziness, no fever, no nausea, no palpitations, no seizures, no shortness of breath, no vomiting and no weakness        Review of Systems   Constitutional:  Negative for activity change, appetite change, chills and fever.   HENT:  Negative for congestion, ear pain, postnasal drip, rhinorrhea, sinus pressure, sinus pain, sore throat and tinnitus.    Eyes:  Negative for photophobia, pain and visual disturbance.   Respiratory:  Negative for cough, chest tightness and shortness of breath.    Cardiovascular:  Positive for syncope. Negative for chest pain and palpitations.   Gastrointestinal:  Negative for abdominal pain, constipation, diarrhea, nausea and vomiting.   Genitourinary:  Negative for difficulty urinating, dysuria, flank pain, frequency, hematuria and urgency.   Musculoskeletal:  Negative for arthralgias, back pain, gait problem, neck pain and neck stiffness.   Skin:  Negative for color change, pallor and rash.   Allergic/Immunologic: Negative for environmental allergies and food allergies.   Neurological:  Positive for syncope and headaches. Negative for dizziness, seizures, weakness and numbness.   Psychiatric/Behavioral:  Negative for confusion.    All other systems reviewed and are negative.          Objective       ED Triage Vitals [06/28/25 1528]   Temperature Pulse Blood Pressure " Respirations SpO2 Patient Position - Orthostatic VS   99.6 °F (37.6 °C) 83 148/76 16 98 % --      Temp src Heart Rate Source BP Location FiO2 (%) Pain Score    -- -- -- -- 6      Vitals      Date and Time Temp Pulse SpO2 Resp BP Pain Score FACES Pain Rating User   06/28/25 1830 -- 62 100 % 15 134/67 -- -- OE   06/28/25 1815 -- 71 100 % 12 135/65 -- -- OE   06/28/25 1730 -- 68 99 % 15 110/62 -- -- OE   06/28/25 1700 -- 60 100 % 14 112/60 -- -- OE   06/28/25 1630 -- 65 98 % 14 116/68 -- -- OE   06/28/25 1528 99.6 °F (37.6 °C) 83 98 % 16 148/76 6 -- GAGANDEEP            Physical Exam  Vitals and nursing note reviewed.   Constitutional:       General: She is awake.      Appearance: Normal appearance. She is well-developed and normal weight. She is not ill-appearing, toxic-appearing or diaphoretic.      Comments: /76   Pulse 83   Temp 99.6 °F (37.6 °C)   Resp 16   Wt 80.7 kg (178 lb)   SpO2 98%   BMI 32.56 kg/m²    HENT:      Head: Normocephalic and atraumatic.      Jaw: There is normal jaw occlusion.      Right Ear: Hearing, tympanic membrane, ear canal and external ear normal. No decreased hearing noted. No drainage, swelling or tenderness. No mastoid tenderness.      Left Ear: Hearing, tympanic membrane, ear canal and external ear normal. No decreased hearing noted. No drainage, swelling or tenderness. No mastoid tenderness.      Nose: Nose normal.      Mouth/Throat:      Lips: Pink.      Mouth: Mucous membranes are moist.      Pharynx: Oropharynx is clear. Uvula midline.     Eyes:      General: Lids are normal. Vision grossly intact. Gaze aligned appropriately.         Right eye: No discharge.         Left eye: No discharge.      Extraocular Movements: Extraocular movements intact.      Conjunctiva/sclera: Conjunctivae normal.      Pupils: Pupils are equal, round, and reactive to light.     Neck:      Vascular: No JVD.      Trachea: Trachea and phonation normal. No tracheal tenderness or tracheal deviation.       Comments: No midline tenderness, no stepoffs  No tenderness with passive and active cervical ROM with head turning approximately 45 degree angles to the left and right  No cervical tenderness with cranial axial loading    Cardiovascular:      Rate and Rhythm: Normal rate and regular rhythm.      Pulses: Normal pulses.           Radial pulses are 2+ on the right side and 2+ on the left side.        Dorsalis pedis pulses are 2+ on the right side and 2+ on the left side.        Posterior tibial pulses are 2+ on the right side and 2+ on the left side.      Heart sounds: Normal heart sounds.   Pulmonary:      Effort: Pulmonary effort is normal.      Breath sounds: Normal breath sounds and air entry. No stridor. No decreased breath sounds, wheezing, rhonchi or rales.   Chest:      Chest wall: No tenderness.   Abdominal:      General: Abdomen is flat. Bowel sounds are normal. There is no distension.      Palpations: Abdomen is soft. Abdomen is not rigid.      Tenderness: There is no abdominal tenderness. There is no guarding or rebound.     Musculoskeletal:         General: Normal range of motion.      Cervical back: Full passive range of motion without pain, normal range of motion and neck supple. No rigidity. No spinous process tenderness or muscular tenderness. Normal range of motion.      Comments: Passive ROM intact  Upper and lower extremity 5/5 bilaterally  Neurovascularly intact  No grinding or clicking of joints       Feet:      Right foot:      Toenail Condition: Right toenails are normal.      Left foot:      Toenail Condition: Left toenails are normal.   Lymphadenopathy:      Head:      Right side of head: No submental, submandibular, tonsillar, preauricular, posterior auricular or occipital adenopathy.      Left side of head: No submental, submandibular, tonsillar, preauricular, posterior auricular or occipital adenopathy.      Cervical: No cervical adenopathy.      Right cervical: No superficial, deep or  posterior cervical adenopathy.     Left cervical: No superficial, deep or posterior cervical adenopathy.     Skin:     General: Skin is warm.      Capillary Refill: Capillary refill takes less than 2 seconds.      Findings: No rash.     Neurological:      General: No focal deficit present.      Mental Status: She is alert and oriented to person, place, and time. Mental status is at baseline.      GCS: GCS eye subscore is 4. GCS verbal subscore is 5. GCS motor subscore is 6.      Cranial Nerves: Cranial nerves 2-12 are intact.      Sensory: Sensation is intact. No sensory deficit.      Motor: Motor function is intact.      Coordination: Coordination is intact.      Gait: Gait is intact.      Deep Tendon Reflexes: Reflexes are normal and symmetric.      Reflex Scores:       Patellar reflexes are 2+ on the right side and 2+ on the left side.    Psychiatric:         Attention and Perception: Attention normal.         Mood and Affect: Mood normal.         Speech: Speech normal.         Behavior: Behavior normal. Behavior is cooperative.         Thought Content: Thought content normal.         Judgment: Judgment normal.         Results Reviewed       Procedure Component Value Units Date/Time    TSH, 3rd generation with Free T4 reflex [[id number]]  (Normal) Collected: 06/28/25 1602    Lab Status: Final result Specimen: Blood from Arm, Right Updated: 06/28/25 1756     TSH 3RD GENERATION 0.581 uIU/mL     hCG, qualitative pregnancy [[id number]]  (Normal) Collected: 06/28/25 1602    Lab Status: Final result Specimen: Blood from Arm, Right Updated: 06/28/25 1738     Preg, Serum Negative    HS Troponin 0hr (reflex protocol) [[id number]]  (Normal) Collected: 06/28/25 1602    Lab Status: Final result Specimen: Blood from Arm, Right Updated: 06/28/25 1636     hs TnI 0hr 3 ng/L     Comprehensive metabolic panel [[id number]] Collected: 06/28/25 1602    Lab Status: Final result Specimen: Blood from Arm, Right Updated: 06/28/25 1628      Sodium 138 mmol/L      Potassium 3.7 mmol/L      Chloride 106 mmol/L      CO2 24 mmol/L      ANION GAP 8 mmol/L      BUN 7 mg/dL      Creatinine 0.62 mg/dL      Glucose 83 mg/dL      Calcium 8.8 mg/dL      AST 19 U/L      ALT 11 U/L      Alkaline Phosphatase 44 U/L      Total Protein 7.1 g/dL      Albumin 4.0 g/dL      Total Bilirubin 0.74 mg/dL      eGFR 122 ml/min/1.73sq m     Narrative:      National Kid[employer] Foundation guidelines for Chronic Kidney Disease (CKD):     Stage 1 with normal or high GFR (GFR > 90 mL/min/1.73 square meters)    Stage 2 Mild CKD (GFR = 60-89 mL/min/1.73 square meters)    Stage 3A Moderate CKD (GFR = 45-59 mL/min/1.73 square meters)    Stage 3B Moderate CKD (GFR = 30-44 mL/min/1.73 square meters)    Stage 4 Severe CKD (GFR = 15-29 mL/min/1.73 square meters)    Stage 5 End Stage CKD (GFR <15 mL/min/1.73 square meters)  Note: GFR calculation is accurate only with a steady state creatinine    Magnesium [[id number]]  (Abnormal) Collected: 06/28/25 1602    Lab Status: Final result Specimen: Blood from Arm, Right Updated: 06/28/25 1628     Magnesium 1.8 mg/dL     D-Dimer [[id number]]  (Normal) Collected: 06/28/25 1602    Lab Status: Final result Specimen: Blood from Arm, Right Updated: 06/28/25 1626     D-Dimer, Quant 0.47 ug/ml FEU     Protime-INR [[id number]]  (Normal) Collected: 06/28/25 1602    Lab Status: Final result Specimen: Blood from Arm, Right Updated: 06/28/25 1625     Protime 13.7 seconds      INR 1.00    Narrative:      INR Therapeutic Range    Indication                                             INR Range      Atrial Fibrillation                                               2.0-3.0  Hypercoagulable State                                    2.0.2.3  Left Ventricular Asist Device                            2.0-3.0  Mechanical Heart Valve                                  -    Aortic(with afib, MI, embolism, HF, LA enlargement,    and/or coagulopathy)                                      2.0-3.0 (2.5-3.5)     Mitral                                                             2.5-3.5  Prosthetic/Bioprosthetic Heart Valve               2.0-3.0  Venous thromboembolism (VTE: VT, PE        2.0-3.0    APTT [[id number]]  (Normal) Collected: 06/28/25 1602    Lab Status: Final result Specimen: Blood from Arm, Right Updated: 06/28/25 1625     PTT 30 seconds     CBC and differential [[id number]]  (Abnormal) Collected: 06/28/25 1602    Lab Status: Final result Specimen: Blood from Arm, Right Updated: 06/28/25 1612     WBC 5.09 Thousand/uL      RBC 4.32 Million/uL      Hemoglobin 11.3 g/dL      Hematocrit 35.3 %      MCV 82 fL      MCH 26.2 pg      MCHC 32.0 g/dL      RDW 13.3 %      MPV 10.7 fL      Platelets 242 Thousands/uL      nRBC 0 /100 WBCs      Segmented % 58 %      Immature Grans % 0 %      Lymphocytes % 23 %      Monocytes % 6 %      Eosinophils Relative 12 %      Basophils Relative 1 %      Absolute Neutrophils 2.96 Thousands/µL      Absolute Immature Grans 0.01 Thousand/uL      Absolute Lymphocytes 1.17 Thousands/µL      Absolute Monocytes 0.30 Thousand/µL      Eosinophils Absolute 0.60 Thousand/µL      Basophils Absolute 0.05 Thousands/µL     Fingerstick Glucose (POCT) [[id number]]  (Normal) Collected: 06/28/25 1604    Lab Status: Final result Specimen: Blood Updated: 06/28/25 1606     POC Glucose 83 mg/dl             CTA head and neck with and without contrast    (Results Pending)       ECG 12 Lead Documentation Only    Date/Time: 6/28/2025 4:01 PM    Performed by: Rivera Gonzales PA-C  Authorized by: Rivera Gonzales PA-C    Indications / Diagnosis:  Syncope  ECG reviewed by me, the ED Provider: yes    Patient location:  ED  Previous ECG:     Previous ECG:  Compared to current    Comparison ECG info:  When compared to ECG on March 10, 2025, no significant changes are noted.    Similarity:  No change    Comparison to cardiac monitor: Yes    Interpretation:     Interpretation: normal    Rate:      ECG rate:  61    ECG rate assessment: normal    Rhythm:     Rhythm: sinus rhythm    Ectopy:     Ectopy: none    QRS:     QRS axis:  Normal    QRS intervals:  Normal  Conduction:     Conduction: normal    ST segments:     ST segments:  Normal  T waves:     T waves: normal        ED Medication and Procedure Management   Prior to Admission Medications   Prescriptions Last Dose Informant Patient Reported? Taking?   EPINEPHrine (EPIPEN) 0.3 mg/0.3 mL SOAJ  Self Yes No   EPINEPHrine (EPIPEN) 0.3 mg/0.3 mL SOAJ   No No   Sig: Inject 0.3 mL (0.3 mg total) into a muscle once for 1 dose   albuterol (2.5 mg/3 mL) 0.083 % nebulizer solution  Self No No   Sig: Take 3 mL (2.5 mg total) by nebulization every 4 (four) hours as needed for wheezing or shortness of breath   Patient not taking: Reported on 8/27/2024   albuterol (Ventolin HFA) 90 mcg/act inhaler   No No   Sig: Inhale 2 puffs every 4 (four) hours as needed for wheezing   fluticasone-salmeterol (AIRDUO RESPICLICK) 113-14 mcg/act dry powder inhaler   No No   Sig: inhale 1 puff by mouth and INTO THE LUNGS twice a day Rinse mouth after use   loratadine (CLARITIN) 10 mg tablet   No No   Sig: Take 1 tablet (10 mg total) by mouth daily   methocarbamol (ROBAXIN) 500 mg tablet   No No   Sig: Take 1 tablet (500 mg total) by mouth 2 (two) times a day for 4 days   mupirocin (BACTROBAN) 2 % ointment   No No   Sig: Apply topically 3 (three) times a day   naproxen (Naprosyn) 500 mg tablet   No No   Sig: Take 1 tablet (500 mg total) by mouth 2 (two) times a day with meals for 7 days   triamcinolone (KENALOG) 0.1 % cream   No No   Sig: Apply topically 2 (two) times a day      Facility-Administered Medications: None     Patient's Medications   Discharge Prescriptions    No medications on file     No discharge procedures on file.  ED SEPSIS DOCUMENTATION   Time reflects when diagnosis was documented in both MDM as applicable and the Disposition within this note       Time User Action Codes  Description Comment    2025  6:59 PM Rivera Gonzales [R55] Near syncope     2025  6:59 PM Rivera Gonzales [E83.42] Hypomagnesemia                      [1]   Past Medical History:  Diagnosis Date    Asthma     Eczema     Vitiligo    [2]   Past Surgical History:  Procedure Laterality Date     SECTION         [3]   Family History  Problem Relation Name Age of Onset    Eczema Mother      Eczema Maternal Grandfather Maximino Bear     Asthma Maternal Grandfather Maximino Bear    [4]   Social History  Tobacco Use    Smoking status: Former     Average packs/day: (0.1 ttl pk-yrs)     Types: Cigarettes, Cigars     Start date:     Smokeless tobacco: Never    Tobacco comments:     Report smokes a weekly cigar   Vaping Use    Vaping status: Some Days   Substance Use Topics    Alcohol use: Yes     Comment: occasional    Drug use: Not Currently     Frequency: 1.0 times per week     Types: Marijuana        Rivera Gonzales PA-C  25 [id number]

## 2025-06-28 NOTE — Clinical Note
Teresa Bear was seen and treated in our emergency department on 6/28/2025.                Diagnosis:     Teresa  .    She may return on this date: 06/30/2025         If you have any questions or concerns, please don't hesitate to call.      Rivera Gonzales PA-C    ______________________________           _______________          _______________  Hospital Representative                              Date                                Time

## 2025-07-03 ENCOUNTER — TELEPHONE (OUTPATIENT)
Dept: PULMONOLOGY | Facility: MEDICAL CENTER | Age: 30
End: 2025-07-03

## 2025-07-03 NOTE — TELEPHONE ENCOUNTER
ANKUR for patient to call office back to schedule follow up appointment. Patient due August for a 3m f/u. Schedule patient with Allegra

## 2025-07-22 ENCOUNTER — OFFICE VISIT (OUTPATIENT)
Age: 30
End: 2025-07-22

## 2025-07-22 VITALS
RESPIRATION RATE: 18 BRPM | OXYGEN SATURATION: 97 % | HEIGHT: 60 IN | DIASTOLIC BLOOD PRESSURE: 80 MMHG | TEMPERATURE: 98.4 F | BODY MASS INDEX: 33.77 KG/M2 | HEART RATE: 70 BPM | SYSTOLIC BLOOD PRESSURE: 139 MMHG | WEIGHT: 172 LBS

## 2025-07-22 DIAGNOSIS — L30.9 SEVERE ECZEMA: ICD-10-CM

## 2025-07-22 DIAGNOSIS — M79.601 PAIN OF RIGHT UPPER EXTREMITY: ICD-10-CM

## 2025-07-22 DIAGNOSIS — Z00.00 ANNUAL PHYSICAL EXAM: Primary | ICD-10-CM

## 2025-07-22 DIAGNOSIS — Z11.4 SCREENING FOR HIV (HUMAN IMMUNODEFICIENCY VIRUS): ICD-10-CM

## 2025-07-22 DIAGNOSIS — J45.30 MILD PERSISTENT ASTHMA WITHOUT COMPLICATION: ICD-10-CM

## 2025-07-22 DIAGNOSIS — Z11.59 NEED FOR HEPATITIS C SCREENING TEST: ICD-10-CM

## 2025-07-22 PROCEDURE — 99395 PREV VISIT EST AGE 18-39: CPT | Performed by: FAMILY MEDICINE

## 2025-07-22 RX ORDER — TRIAMCINOLONE ACETONIDE 1 MG/G
CREAM TOPICAL 2 TIMES DAILY
Qty: 453.6 G | Refills: 2 | Status: SHIPPED | OUTPATIENT
Start: 2025-07-22

## 2025-07-22 RX ORDER — LORATADINE 10 MG/1
10 TABLET ORAL DAILY
Qty: 100 TABLET | Refills: 3 | Status: SHIPPED | OUTPATIENT
Start: 2025-07-22 | End: 2025-07-22 | Stop reason: SDUPTHER

## 2025-07-22 RX ORDER — DICLOFENAC SODIUM 10 MG/G
2 GEL TOPICAL 4 TIMES DAILY
Refills: 2 | OUTPATIENT
Start: 2025-07-22

## 2025-07-22 RX ORDER — LORATADINE 10 MG/1
10 TABLET ORAL DAILY
Qty: 100 TABLET | Refills: 3 | OUTPATIENT
Start: 2025-07-22

## 2025-07-22 RX ORDER — LORATADINE 10 MG/1
10 TABLET ORAL DAILY
Qty: 100 TABLET | Refills: 3 | Status: SHIPPED | OUTPATIENT
Start: 2025-07-22

## 2025-07-22 RX ORDER — TRIAMCINOLONE ACETONIDE 1 MG/G
CREAM TOPICAL 2 TIMES DAILY
Qty: 453.6 G | Refills: 2 | Status: SHIPPED | OUTPATIENT
Start: 2025-07-22 | End: 2025-07-22 | Stop reason: SDUPTHER

## 2025-07-22 NOTE — ASSESSMENT & PLAN NOTE
Refill ordered per patient request.     Orders:  •  loratadine (CLARITIN) 10 mg tablet; Take 1 tablet (10 mg total) by mouth daily

## 2025-07-22 NOTE — TELEPHONE ENCOUNTER
Received same notice about Diclofenac Rx. Please see which pharmacy she would like all of today's prescriptions transferred to.

## 2025-07-22 NOTE — PATIENT INSTRUCTIONS
"Patient Education     Routine physical for adults   The Basics   Written by the doctors and editors at [facility]   What is a physical? -- A physical is a routine visit, or \"check-up,\" with your doctor. You might also hear it called a \"wellness visit\" or \"preventive visit.\"  During each visit, the doctor will:   Ask about your physical and mental health   Ask about your habits, behaviors, and lifestyle   Do an exam   Give you vaccines if needed   Talk to you about any medicines you take   Give advice about your health   Answer your questions  Getting regular check-ups is an important part of taking care of your health. It can help your doctor find and treat any problems you have. But it's also important for preventing health problems.  A routine physical is different from a \"sick visit.\" A sick visit is when you see a doctor because of a health concern or problem. Since physicals are scheduled ahead of time, you can think about what you want to ask the doctor.  How often should I get a physical? -- It depends on your age and health. In general, for people age 21 years and older:   If you are younger than 50 years, you might be able to get a physical every 3 years.   If you are 50 years or older, your doctor might recommend a physical every year.  If you have an ongoing health condition, like diabetes or high blood pressure, your doctor will probably want to see you more often.  What happens during a physical? -- In general, each visit will include:   Physical exam - The doctor or nurse will check your height, weight, heart rate, and blood pressure. They will also look at your eyes and ears. They will ask about how you are feeling and whether you have any symptoms that bother you.   Medicines - It's a good idea to bring a list of all the medicines you take to each doctor visit. Your doctor will talk to you about your medicines and answer any questions. Tell them if you are having any side effects that bother you. You " "should also tell them if you are having trouble paying for any of your medicines.   Habits and behaviors - This includes:   Your diet   Your exercise habits   Whether you smoke, drink alcohol, or use drugs   Whether you are sexually active   Whether you feel safe at home  Your doctor will talk to you about things you can do to improve your health and lower your risk of health problems. They will also offer help and support. For example, if you want to quit smoking, they can give you advice and might prescribe medicines. If you want to improve your diet or get more physical activity, they can help you with this, too.   Lab tests, if needed - The tests you get will depend on your age and situation. For example, your doctor might want to check your:   Cholesterol   Blood sugar   Iron level   Vaccines - The recommended vaccines will depend on your age, health, and what vaccines you already had. Vaccines are very important because they can prevent certain serious or deadly infections.   Discussion of screening - \"Screening\" means checking for diseases or other health problems before they cause symptoms. Your doctor can recommend screening based on your age, risk, and preferences. This might include tests to check for:   Cancer, such as breast, prostate, cervical, ovarian, colorectal, prostate, lung, or skin cancer   Sexually transmitted infections, such as chlamydia and gonorrhea   Mental health conditions like depression and anxiety  Your doctor will talk to you about the different types of screening tests. They can help you decide which screenings to have. They can also explain what the results might mean.   Answering questions - The physical is a good time to ask the doctor or nurse questions about your health. If needed, they can refer you to other doctors or specialists, too.  Adults older than 65 years often need other care, too. As you get older, your doctor will talk to you about:   How to prevent falling at " home   Hearing or vision tests   Memory testing   How to take your medicines safely   Making sure that you have the help and support you need at home  All topics are updated as new evidence becomes available and our peer review process is complete.  This topic retrieved from [employer] on: May 02, 2024.  Topic 727696 Version 1.0  Release: 32.4.3 - C32.122  © 2024 [employer], [employer] and/or its affiliates. All rights reserved.  Consumer Information Use and Disclaimer   Disclaimer: This generalized information is a limited summary of diagnosis, treatment, and/or medication information. It is not meant to be comprehensive and should be used as a tool to help the user understand and/or assess potential diagnostic and treatment options. It does NOT include all information about conditions, treatments, medications, side effects, or risks that may apply to a specific patient. It is not intended to be medical advice or a substitute for the medical advice, diagnosis, or treatment of a health care provider based on the health care provider's examination and assessment of a patient's specific and unique circumstances. Patients must speak with a health care provider for complete information about their health, medical questions, and treatment options, including any risks or benefits regarding use of medications. This information does not endorse any treatments or medications as safe, effective, or approved for treating a specific patient. [employer], Inc[employer] and its affiliates disclaim any warranty or liability relating to this information or the use thereof.The use of this information is governed by the Terms of Use, available at https://www.woltersZeusuwer.com/en/know/clinical-effectiveness-terms. 2024© [employer]Date, Inc. and its affiliates and/or licensors. All rights reserved.  Copyright   © 2024 [employer], [employer] and/or its affiliates. All rights reserved.    Patient Education     Exercise and movement   The Basics   Written by the doctors and  editors at [employer]   What are the benefits of movement? -- Moving your body has many benefits. It can:   Burn calories, which helps people manage their weight   Help control blood sugar levels in people with diabetes   Lower blood pressure, especially in people with high blood pressure   Lower stress, and help with depression and anxiety   Keep bones strong, so they don't get thin and break easily   Lower the chance of dying from heart disease  Adding even small amounts of physical activity to your daily routine can improve your health.  What are the main types of exercise? -- There are 3 main types of exercise:   Aerobic exercise - This raises your heart rate. Examples include walking, running, dancing, riding a bike, and swimming.   Muscle strengthening - This helps make your muscles stronger. You can do it using weights, exercise bands, or weight machines. You can also use your own body weight, as with push-ups, or by lifting items in your home, like jugs of water.   Stretching - These help your muscles and joints move more easily.  It's important to have all 3 types in your exercise program. That way, your body, muscles, and joints can be as healthy as possible.  Should I talk to my doctor or nurse before I start exercising? -- If you have not exercised before or have not exercised in a long time, talk with your doctor or nurse before you start a very active exercise program.  If you have heart disease or risk factors for heart disease (like high blood pressure or diabetes), your doctor or nurse might recommend that you have an exercise test before starting an exercise program.  When you begin an exercise program, start slowly. For example, do the exercise at a slow pace or for a few minutes only. Over time, you can exercise faster and for longer periods of time.  What should I do when I exercise? -- Each time you exercise, you should:   Warm up - This can help keep you from hurting your muscles when you  exercise. To warm up, do a light aerobic exercise (such as walking slowly) or stretch for 5 to 10 minutes.   Work out - Try to get a mix of aerobic exercise, muscle strengthening, and stretching. During an aerobic workout, you can walk fast, swim, run, or use an exercise machine, for example. Other activities, like dancing or playing tennis, are also forms of aerobic exercise. You should also take time to stretch all of your joints, including your neck, shoulders, back, hips, and knees. At least 2 times a week, you can do muscle strengthening exercises as part of your workout.   Cool down - This helps keep you from feeling dizzy after you exercise and helps prevent muscle cramps. To cool down, you can stretch or do a light aerobic exercise for 5 minutes.  Some people go to a gym or do group exercise classes. But you can exercise even without these things. Some exercises can be done even in a small space. You can also try online videos or smartphone apps to get ideas for different types of exercise.  How often should I exercise? -- Doctors recommend that people exercise at least 30 minutes a day, on 5 or more days of the week.  If you can't exercise for 30 minutes straight, try to exercise for 10 minutes at a time, 3 or 4 times a day. Even exercising for shorter amounts of time is good for you, especially if it means spending less time sitting.  When should I call my doctor or nurse? -- If you have any of the following symptoms when you exercise, stop exercising and call your doctor or nurse right away:   Pain or pressure in your chest, arms, throat, jaw, or back   Nausea or vomiting   Feeling like your heart is fluttering or racing very fast   Feeling dizzy or faint  What if I don't have time to exercise? -- Many people have very busy lives and might not think that they have time to exercise. But it's important to try to find time, even if you are tired or work a lot. Exercise can increase your energy level, which  can make you feel better and might even help you get more work done.  Even if it's hard to set aside a lot of time to exercise, you can still improve your health by moving your body more. There are many ways that you can be more active. For example, you can:   Take the stairs instead of the elevator.   Park in a parking space that is farther away from the door.   Take a longer route when you walk from one place to another.  Spending a lot of time sitting still (for example, watching TV or working on the computer) is bad for your health. Try to get up and move around whenever you can. Even small amounts of movement, like taking short walks, doing household chores, or gardening, can improve your health. Finding activities that you enjoy, or doing them with other people, can help you add more movement into your daily life.  What else should I do when I exercise? -- To exercise safely and avoid problems, it's important to:   Drink fluids during and after exercising (but avoid drinks with a lot of caffeine or sugar).   Avoid exercising outside if it is too hot or cold.   Wear layers of clothes, so that you can take them off if you get too hot.   Wear shoes that fit well and support your feet.   Be aware of your surroundings if you exercise outside.  All topics are updated as new evidence becomes available and our peer review process is complete.  This topic retrieved from [employer] on: May 18, 2024.  Topic 67798 Version 31.0  Release: 32.4.3 - C32.137  © 2024 Up[employer], Inc. and/or its affiliates. All rights reserved.  Consumer Information Use and Disclaimer   Disclaimer: This generalized information is a limited summary of diagnosis, treatment, and/or medication information. It is not meant to be comprehensive and should be used as a tool to help the user understand and/or assess potential diagnostic and treatment options. It does NOT include all information about conditions, treatments, medications, side effects, or  risks that may apply to a specific patient. It is not intended to be medical advice or a substitute for the medical advice, diagnosis, or treatment of a health care provider based on the health care provider's examination and assessment of a patient's specific and unique circumstances. Patients must speak with a health care provider for complete information about their health, medical questions, and treatment options, including any risks or benefits regarding use of medications. This information does not endorse any treatments or medications as safe, effective, or approved for treating a specific patient. [employer], [employer] and its affiliates disclaim any warranty or liability relating to this information or the use thereof.The use of this information is governed by the Terms of Use, available at https://www.woltersHealth Outcomes Sciencesuwer.com/en/know/clinical-effectiveness-terms. 2024© [employer]Date, Inc. and its affiliates and/or licensors. All rights reserved.  Copyright   © 2024 [employer], [employer]. and/or its affiliates. All rights reserved.

## 2025-07-22 NOTE — ASSESSMENT & PLAN NOTE
Refill ordered per patient request.     Orders:  •  triamcinolone (KENALOG) 0.1 % cream; Apply topically 2 (two) times a day  •  loratadine (CLARITIN) 10 mg tablet; Take 1 tablet (10 mg total) by mouth daily

## 2025-07-22 NOTE — TELEPHONE ENCOUNTER
Attempted x 3 to call patient. Phone number on file is not accepting calls at this time. Claritin Rx was sent to [facility] pharmacy however they are out of this medication and can not order more due to pharmacy closing 7/28. Please re-attempt to contact patient to see which alternative pharmacy she would like Rx sent to. Thank you!

## 2025-07-22 NOTE — PROGRESS NOTES
"Adult Annual Physical  Name: Teresa Bear      : 1995      MRN: [id number]  Encounter Provider: Jeremie Oh DO  Encounter Date: 2025   Encounter department: [facility]    :  Assessment & Plan  Annual physical exam  Discussed age appropriate screenings and labs. Reviewed acute concerns of 1 week R arm/hand pain.    Denies smoking and recreational drug use. Drinks socially.    Ordered remaining pertinent labs: lipid panel and Hgb A1C  Pap smear and HPV completed 24. Cleared until 2029  Pain of right upper extremity  Endorses 1 week h/o R arm/hand pain described as numbness, tingling, \"static\" feeling with no weakness or decrease in ROM. She works as a  and does a lot of heavy lifting for her work. The discomfort is intermittent and each episode lasts about 5 seconds and resolves on its own. Denies trauma, injury, change in lifestyle habits. Of note, does hit her arm on truck door on a regular basis. She has not tried anything for the pain and does not notice the sensation above the elbow.    + Phalen, - Reverse Phalen and Francisco    Discussed symptomatic management including trialing NSAIDs, rest, ordered PT referral, provided home exercises, and ordered Voltaren gel.  Will follow up in 4 weeks and re-assess pain.    Orders:  •  Diclofenac Sodium (VOLTAREN) 1 %; Apply 2 g topically 4 (four) times a day  •  Ambulatory Referral to Physical Therapy; Future    Severe eczema  Refill ordered per patient request.     Orders:  •  triamcinolone (KENALOG) 0.1 % cream; Apply topically 2 (two) times a day  •  loratadine (CLARITIN) 10 mg tablet; Take 1 tablet (10 mg total) by mouth daily    Mild persistent asthma without complication  Refill ordered per patient request.     Orders:  •  loratadine (CLARITIN) 10 mg tablet; Take 1 tablet (10 mg total) by mouth daily    Need for hepatitis C screening test    Orders:  •  Hepatitis C antibody; Future    Screening for " HIV (human immunodeficiency virus)    Orders:  •  HIV 1/2 AB/AG w Reflex [facility] for 2 yr old and above; Future        Preventive Screenings:  - Diabetes Screening: screening up-to-date, risks/benefits discussed and orders placed  - Cholesterol Screening: risks/benefits discussed and orders placed   - Hepatitis C screening: risks/benefits discussed, patient agrees to screening and orders placed   - HIV screening: risks/benefits discussed, patient agrees to screening and orders placed   - Cervical cancer screening: risks/benefits discussed and screening up-to-date   - Colon cancer screening: screening not indicated   - Lung cancer screening: screening not indicated     Pap and HPV completed 11/13/24. Due in 2029.     Counseling/Anticipatory Guidance:  - Alcohol: discussed moderation in alcohol intake and recommendations for healthy alcohol use.   - Drug use: discussed harms of illicit drug use and how it can negatively impact mental/physical health.   - Tobacco use: discussed harms of tobacco use and management options for quitting.   - Dental health: discussed importance of regular tooth brushing, flossing, and dental visits.   - Diet: discussed recommendations for a healthy/well-balanced diet.   - Exercise: the importance of regular exercise/physical activity was discussed. Recommend exercise 3-5 times per week for at least 30 minutes.   - Injury prevention: discussed safety/seat belts, safety helmets, smoke detectors, carbon monoxide detectors, and smoking near bedding or upholstery.     BMI Counseling: Body mass index is 33.59 kg/m². The BMI is above normal. Nutrition recommendations include decreasing fast food intake, consuming healthier snacks, limiting drinks that contain sugar, moderation in carbohydrate intake, increasing intake of lean protein, reducing intake of saturated and trans fat and reducing intake of cholesterol. Exercise recommendations include exercising 3-5 times per week and strength training  exercises. Rationale for BMI follow-up plan is due to patient being overweight or obese.     Depression Screening and Follow-up Plan: Patient was screened for depression during today's encounter. They screened negative with a PHQ-2 score of 1.          History of Present Illness   {?Quick Links Encounters * My Last Note * Last Note in Specialty * Snapshot * Since Last Visit * History :03753}  Adult Annual Physical:  Patient presents for annual physical. Patient is a pleasant 29 yo F with pmhx of severe eczema, mild persistent asthma, vitiligo, seasonal allergies presenting for annual physical. Discussed age appropriate labs and screenings. Discussed symptomatic management of recent episode of R arm pain and will follow up in 4 weeks..     Diet and Physical Activity:  - Diet/Nutrition: no special diet.  - Exercise: no formal exercise and walking.    Depression Screening:  - PHQ-2 Score: 1    General Health:  - Sleep: 4-6 hours of sleep on average and sleeps well. Denies snoring hx.  - Hearing: normal hearing bilateral ears.  - Vision: no vision problems and most recent eye exam > 1 year ago.  - Dental: floss regularly, regular dental visits and brushes teeth twice daily. Last dental exam in March 2025    /GYN Health:  - Follows with GYN: yes.   - Last menstrual cycle: 6/25/2025.   - History of STDs: yes    Review of Systems   Constitutional:  Negative for chills, fatigue and fever.   HENT:  Negative for hearing loss and sore throat.    Eyes:  Negative for visual disturbance.   Respiratory:  Negative for cough and shortness of breath.    Cardiovascular:  Negative for chest pain and leg swelling.   Gastrointestinal:  Negative for abdominal pain, constipation, diarrhea, nausea and vomiting.   Genitourinary:  Negative for difficulty urinating, dysuria and hematuria.   Musculoskeletal:  Negative for arthralgias and back pain.   Skin:  Negative for rash.   Neurological:  Positive for numbness (R hand and arm). Negative  for dizziness, syncope, weakness, light-headedness and headaches.   Hematological:  Does not bruise/bleed easily.     Medical History Reviewed by provider this encounter:  Tobacco  Allergies  Meds  Problems  Med Hx  Surg Hx  Fam Hx     .  Past Medical History   Past Medical History[1]  Past Surgical History[1]  Family History[1]   reports that she has quit smoking. Her smoking use included cigarettes and cigars. She started smoking about 6 years ago. She has a 0.1 pack-year smoking history. She has never used smokeless tobacco. She reports current alcohol use. She reports that she does not currently use drugs after having used the following drugs: Marijuana. Frequency: 1.00 time per week.  Current Outpatient Medications   Medication Instructions   • albuterol (Ventolin HFA) 90 mcg/act inhaler 2 puffs, Inhalation, Every 4 hours PRN   • albuterol 2.5 mg, Nebulization, Every 4 hours PRN   • Diclofenac Sodium (VOLTAREN) 2 g, Topical, 4 times daily   • EPINEPHrine (EPIPEN) 0.3 mg/0.3 mL SOAJ    • EPINEPHrine (EPIPEN) 0.3 mg, Intramuscular, Once   • fluticasone-salmeterol (AIRDUO RESPICLICK) 113-14 mcg/act dry powder inhaler 1 puff, Inhalation, 2 times daily, Rinse mouth after use   • loratadine (CLARITIN) 10 mg, Oral, Daily   • mupirocin (BACTROBAN) 2 % ointment Topical, 3 times daily   • triamcinolone (KENALOG) 0.1 % cream Topical, 2 times daily   Allergies[1]   Medications Ordered Prior to Encounter[1]   Social History[1]    Objective {?Quick Links Trend Vitals * Enter New Vitals * Results Review * Timeline (Adult) * Labs * Imaging * Cardiology * Procedures * Lung Cancer Screening * Surgical eConsent :35825}  /80 (BP Location: Left arm, Patient Position: Sitting, Cuff Size: Standard)   Pulse 70   Temp 98.4 °F (36.9 °C) (Tympanic)   Resp 18   Ht 5' (1.524 m)   Wt 78 kg (172 lb)   SpO2 97%   BMI 33.59 kg/m²     Physical Exam  Vitals reviewed.   Constitutional:       General: She is not in acute  distress.     Appearance: Normal appearance.   HENT:      Head: Normocephalic and atraumatic.      Right Ear: Tympanic membrane, ear canal and external ear normal.      Left Ear: Tympanic membrane, ear canal and external ear normal.      Nose: Nose normal.      Mouth/Throat:      Mouth: Mucous membranes are moist.      Pharynx: Oropharynx is clear.     Eyes:      General:         Right eye: No discharge.         Left eye: No discharge.      Extraocular Movements: Extraocular movements intact.      Conjunctiva/sclera: Conjunctivae normal.      Pupils: Pupils are equal, round, and reactive to light.       Cardiovascular:      Rate and Rhythm: Normal rate and regular rhythm.      Pulses: Normal pulses.      Heart sounds: Normal heart sounds.   Pulmonary:      Effort: Pulmonary effort is normal.      Breath sounds: Normal breath sounds.   Abdominal:      General: Abdomen is flat. There is no distension.      Palpations: Abdomen is soft.      Tenderness: There is no abdominal tenderness. There is no right CVA tenderness, left CVA tenderness or guarding.     Musculoskeletal:         General: No tenderness. Normal range of motion.      Cervical back: Normal range of motion.      Right lower leg: No edema.      Left lower leg: No edema.     Skin:     General: Skin is warm and dry.      Capillary Refill: Capillary refill takes less than 2 seconds.      Comments: Vitiligo patches present.     Neurological:      General: No focal deficit present.      Mental Status: She is alert and oriented to person, place, and time.      Motor: No weakness.      Gait: Gait normal.      Comments: R arm and hand numbness and tingling noted diffusely - distributed below elbow. Sensation, ROM, strength intact 5/5.   Psychiatric:         Mood and Affect: Mood normal.         Behavior: Behavior normal.              [1]  Past Medical History:  Diagnosis Date   • Asthma    • Eczema    • Vitiligo    [1]  Past Surgical History:  Procedure Laterality  Date   •  SECTION         [1]  Family History  Problem Relation Name Age of Onset   • Eczema Mother     • Eczema Maternal Grandfather Maximino Bear    • Asthma Maternal Grandfather Maximino Bear    [1]  Allergies  Allergen Reactions   • Peanuts [Peanut Oil - Food Allergy] Itching   [1]  Current Outpatient Medications on File Prior to Visit   Medication Sig Dispense Refill   • albuterol (Ventolin HFA) 90 mcg/act inhaler Inhale 2 puffs every 4 (four) hours as needed for wheezing 8 g 2   • EPINEPHrine (EPIPEN) 0.3 mg/0.3 mL SOAJ      • fluticasone-salmeterol (AIRDUO RESPICLICK) 113-14 mcg/act dry powder inhaler inhale 1 puff by mouth and INTO THE LUNGS twice a day Rinse mouth after use 3.333 each 11   • mupirocin (BACTROBAN) 2 % ointment Apply topically 3 (three) times a day 30 g 2   • [DISCONTINUED] loratadine (CLARITIN) 10 mg tablet Take 1 tablet (10 mg total) by mouth daily 100 tablet 3   • [DISCONTINUED] triamcinolone (KENALOG) 0.1 % cream Apply topically 2 (two) times a day 80 g 2   • albuterol (2.5 mg/3 mL) 0.083 % nebulizer solution Take 3 mL (2.5 mg total) by nebulization every 4 (four) hours as needed for wheezing or shortness of breath (Patient not taking: Reported on 2024) 360 mL 07   • EPINEPHrine (EPIPEN) 0.3 mg/0.3 mL SOAJ Inject 0.3 mL (0.3 mg total) into a muscle once for 1 dose 0.6 mL 0   • [DISCONTINUED] methocarbamol (ROBAXIN) 500 mg tablet Take 1 tablet (500 mg total) by mouth 2 (two) times a day for 4 days 8 tablet 0   • [DISCONTINUED] naproxen (Naprosyn) 500 mg tablet Take 1 tablet (500 mg total) by mouth 2 (two) times a day with meals for 7 days 14 tablet 0     No current facility-administered medications on file prior to visit.   [1]  Social History  Tobacco Use   • Smoking status: Former     Average packs/day: (0.1 ttl pk-yrs)     Types: Cigarettes, Cigars     Start date:    • Smokeless tobacco: Never   • Tobacco comments:     Report smokes a weekly cigar   Vaping  Use   • Vaping status: Former   Substance and Sexual Activity   • Alcohol use: Yes     Comment: occasional   • Drug use: Not Currently     Frequency: 1.0 times per week     Types: Marijuana   • Sexual activity: Yes     Partners: Male     Birth control/protection: Abstinence